# Patient Record
Sex: FEMALE | Race: WHITE | Employment: OTHER | URBAN - METROPOLITAN AREA
[De-identification: names, ages, dates, MRNs, and addresses within clinical notes are randomized per-mention and may not be internally consistent; named-entity substitution may affect disease eponyms.]

---

## 2021-02-14 ENCOUNTER — IMMUNIZATION (OUTPATIENT)
Dept: PRIMARY CARE CLINIC | Age: 71
End: 2021-02-14
Payer: MEDICARE

## 2021-02-14 PROCEDURE — 91300 COVID-19, PFIZER VACCINE 30MCG/0.3ML DOSE: CPT | Performed by: PHYSICIAN ASSISTANT

## 2021-02-14 PROCEDURE — 0001A COVID-19, PFIZER VACCINE 30MCG/0.3ML DOSE: CPT | Performed by: PHYSICIAN ASSISTANT

## 2021-03-10 ENCOUNTER — IMMUNIZATION (OUTPATIENT)
Dept: PRIMARY CARE CLINIC | Age: 71
End: 2021-03-10
Payer: MEDICARE

## 2021-03-10 PROCEDURE — 0002A COVID-19, PFIZER VACCINE 30MCG/0.3ML DOSE: CPT | Performed by: NURSE PRACTITIONER

## 2021-03-10 PROCEDURE — 91300 COVID-19, PFIZER VACCINE 30MCG/0.3ML DOSE: CPT | Performed by: NURSE PRACTITIONER

## 2021-09-22 ENCOUNTER — APPOINTMENT (OUTPATIENT)
Dept: CT IMAGING | Age: 71
DRG: 854 | End: 2021-09-22
Payer: MEDICARE

## 2021-09-22 ENCOUNTER — HOSPITAL ENCOUNTER (EMERGENCY)
Age: 71
Discharge: ANOTHER HOSPITAL | DRG: 854 | End: 2021-09-22
Payer: MEDICARE

## 2021-09-22 ENCOUNTER — HOSPITAL ENCOUNTER (INPATIENT)
Age: 71
LOS: 7 days | Discharge: HOME | DRG: 854 | End: 2021-09-29
Attending: EMERGENCY MEDICINE | Admitting: INTERNAL MEDICINE
Payer: MEDICARE

## 2021-09-22 VITALS
HEART RATE: 137 BPM | WEIGHT: 137 LBS | OXYGEN SATURATION: 98 % | TEMPERATURE: 97.8 F | DIASTOLIC BLOOD PRESSURE: 56 MMHG | BODY MASS INDEX: 20.76 KG/M2 | SYSTOLIC BLOOD PRESSURE: 96 MMHG | HEIGHT: 68 IN | RESPIRATION RATE: 16 BRPM

## 2021-09-22 DIAGNOSIS — A41.9 SEPSIS DUE TO CELLULITIS (HCC): ICD-10-CM

## 2021-09-22 DIAGNOSIS — N83.202 LEFT OVARIAN CYST: ICD-10-CM

## 2021-09-22 DIAGNOSIS — L03.90 CELLULITIS, UNSPECIFIED CELLULITIS SITE: Primary | ICD-10-CM

## 2021-09-22 DIAGNOSIS — L03.90 SEPSIS DUE TO CELLULITIS (HCC): ICD-10-CM

## 2021-09-22 DIAGNOSIS — L03.311 ABDOMINAL WALL CELLULITIS: Primary | ICD-10-CM

## 2021-09-22 LAB
ANION GAP SERPL CALCULATED.3IONS-SCNC: 15 MMOL/L (ref 7–16)
BASOPHILS ABSOLUTE: 0.05 E9/L (ref 0–0.2)
BASOPHILS RELATIVE PERCENT: 0.3 % (ref 0–2)
BUN BLDV-MCNC: 18 MG/DL (ref 6–23)
CALCIUM SERPL-MCNC: 9.3 MG/DL (ref 8.6–10.2)
CHLORIDE BLD-SCNC: 99 MMOL/L (ref 98–107)
CO2: 23 MMOL/L (ref 22–29)
CREAT SERPL-MCNC: 0.9 MG/DL (ref 0.5–1)
EOSINOPHILS ABSOLUTE: 0.06 E9/L (ref 0.05–0.5)
EOSINOPHILS RELATIVE PERCENT: 0.4 % (ref 0–6)
GFR AFRICAN AMERICAN: >60
GFR NON-AFRICAN AMERICAN: >60 ML/MIN/1.73
GLUCOSE BLD-MCNC: 121 MG/DL (ref 74–99)
HBA1C MFR BLD: 5.6 % (ref 4–5.6)
HCT VFR BLD CALC: 41.2 % (ref 34–48)
HEMOGLOBIN: 13.8 G/DL (ref 11.5–15.5)
IMMATURE GRANULOCYTES #: 0.1 E9/L
IMMATURE GRANULOCYTES %: 0.7 % (ref 0–5)
LACTIC ACID: 1.3 MMOL/L (ref 0.5–2.2)
LYMPHOCYTES ABSOLUTE: 0.78 E9/L (ref 1.5–4)
LYMPHOCYTES RELATIVE PERCENT: 5.2 % (ref 20–42)
MCH RBC QN AUTO: 27.8 PG (ref 26–35)
MCHC RBC AUTO-ENTMCNC: 33.5 % (ref 32–34.5)
MCV RBC AUTO: 83.1 FL (ref 80–99.9)
MONOCYTES ABSOLUTE: 0.8 E9/L (ref 0.1–0.95)
MONOCYTES RELATIVE PERCENT: 5.4 % (ref 2–12)
NEUTROPHILS ABSOLUTE: 13.1 E9/L (ref 1.8–7.3)
NEUTROPHILS RELATIVE PERCENT: 88 % (ref 43–80)
PDW BLD-RTO: 12.8 FL (ref 11.5–15)
PLATELET # BLD: 321 E9/L (ref 130–450)
PMV BLD AUTO: 9.2 FL (ref 7–12)
POTASSIUM SERPL-SCNC: 4 MMOL/L (ref 3.5–5)
RBC # BLD: 4.96 E12/L (ref 3.5–5.5)
SODIUM BLD-SCNC: 137 MMOL/L (ref 132–146)
TSH SERPL DL<=0.05 MIU/L-ACNC: 1.95 UIU/ML (ref 0.27–4.2)
WBC # BLD: 14.9 E9/L (ref 4.5–11.5)

## 2021-09-22 PROCEDURE — 85025 COMPLETE CBC W/AUTO DIFF WBC: CPT

## 2021-09-22 PROCEDURE — 1200000000 HC SEMI PRIVATE

## 2021-09-22 PROCEDURE — 6360000002 HC RX W HCPCS: Performed by: STUDENT IN AN ORGANIZED HEALTH CARE EDUCATION/TRAINING PROGRAM

## 2021-09-22 PROCEDURE — 74177 CT ABD & PELVIS W/CONTRAST: CPT

## 2021-09-22 PROCEDURE — 99223 1ST HOSP IP/OBS HIGH 75: CPT | Performed by: INTERNAL MEDICINE

## 2021-09-22 PROCEDURE — 80048 BASIC METABOLIC PNL TOTAL CA: CPT

## 2021-09-22 PROCEDURE — 99284 EMERGENCY DEPT VISIT MOD MDM: CPT

## 2021-09-22 PROCEDURE — 2580000003 HC RX 258: Performed by: INTERNAL MEDICINE

## 2021-09-22 PROCEDURE — 6370000000 HC RX 637 (ALT 250 FOR IP): Performed by: INTERNAL MEDICINE

## 2021-09-22 PROCEDURE — 2580000003 HC RX 258: Performed by: STUDENT IN AN ORGANIZED HEALTH CARE EDUCATION/TRAINING PROGRAM

## 2021-09-22 PROCEDURE — 83036 HEMOGLOBIN GLYCOSYLATED A1C: CPT

## 2021-09-22 PROCEDURE — 6360000002 HC RX W HCPCS: Performed by: INTERNAL MEDICINE

## 2021-09-22 PROCEDURE — 6360000004 HC RX CONTRAST MEDICATION: Performed by: RADIOLOGY

## 2021-09-22 PROCEDURE — 87040 BLOOD CULTURE FOR BACTERIA: CPT

## 2021-09-22 PROCEDURE — 99211 OFF/OP EST MAY X REQ PHY/QHP: CPT

## 2021-09-22 PROCEDURE — 83605 ASSAY OF LACTIC ACID: CPT

## 2021-09-22 PROCEDURE — 84443 ASSAY THYROID STIM HORMONE: CPT

## 2021-09-22 RX ORDER — SODIUM CHLORIDE 0.9 % (FLUSH) 0.9 %
5-40 SYRINGE (ML) INJECTION PRN
Status: DISCONTINUED | OUTPATIENT
Start: 2021-09-22 | End: 2021-09-29 | Stop reason: HOSPADM

## 2021-09-22 RX ORDER — 0.9 % SODIUM CHLORIDE 0.9 %
500 INTRAVENOUS SOLUTION INTRAVENOUS ONCE
Status: COMPLETED | OUTPATIENT
Start: 2021-09-22 | End: 2021-09-22

## 2021-09-22 RX ORDER — SODIUM CHLORIDE 0.9 % (FLUSH) 0.9 %
5-40 SYRINGE (ML) INJECTION EVERY 12 HOURS SCHEDULED
Status: DISCONTINUED | OUTPATIENT
Start: 2021-09-22 | End: 2021-09-29 | Stop reason: HOSPADM

## 2021-09-22 RX ORDER — SODIUM CHLORIDE 9 MG/ML
25 INJECTION, SOLUTION INTRAVENOUS PRN
Status: DISCONTINUED | OUTPATIENT
Start: 2021-09-22 | End: 2021-09-29 | Stop reason: HOSPADM

## 2021-09-22 RX ORDER — ONDANSETRON 4 MG/1
4 TABLET, ORALLY DISINTEGRATING ORAL EVERY 8 HOURS PRN
Status: DISCONTINUED | OUTPATIENT
Start: 2021-09-22 | End: 2021-09-29 | Stop reason: HOSPADM

## 2021-09-22 RX ORDER — 0.9 % SODIUM CHLORIDE 0.9 %
1000 INTRAVENOUS SOLUTION INTRAVENOUS ONCE
Status: COMPLETED | OUTPATIENT
Start: 2021-09-22 | End: 2021-09-22

## 2021-09-22 RX ORDER — ACETAMINOPHEN 650 MG/1
650 SUPPOSITORY RECTAL EVERY 6 HOURS PRN
Status: DISCONTINUED | OUTPATIENT
Start: 2021-09-22 | End: 2021-09-29 | Stop reason: HOSPADM

## 2021-09-22 RX ORDER — ACETAMINOPHEN 325 MG/1
650 TABLET ORAL EVERY 6 HOURS PRN
Status: DISCONTINUED | OUTPATIENT
Start: 2021-09-22 | End: 2021-09-29 | Stop reason: HOSPADM

## 2021-09-22 RX ORDER — ONDANSETRON 2 MG/ML
4 INJECTION INTRAMUSCULAR; INTRAVENOUS EVERY 6 HOURS PRN
Status: DISCONTINUED | OUTPATIENT
Start: 2021-09-22 | End: 2021-09-29 | Stop reason: HOSPADM

## 2021-09-22 RX ORDER — HYDRALAZINE HYDROCHLORIDE 20 MG/ML
10 INJECTION INTRAMUSCULAR; INTRAVENOUS EVERY 8 HOURS PRN
Status: DISCONTINUED | OUTPATIENT
Start: 2021-09-22 | End: 2021-09-29 | Stop reason: HOSPADM

## 2021-09-22 RX ORDER — POLYETHYLENE GLYCOL 3350 17 G/17G
17 POWDER, FOR SOLUTION ORAL DAILY PRN
Status: DISCONTINUED | OUTPATIENT
Start: 2021-09-22 | End: 2021-09-29 | Stop reason: HOSPADM

## 2021-09-22 RX ADMIN — VANCOMYCIN HYDROCHLORIDE 1250 MG: 10 INJECTION, POWDER, LYOPHILIZED, FOR SOLUTION INTRAVENOUS at 14:43

## 2021-09-22 RX ADMIN — SODIUM CHLORIDE 1000 ML: 9 INJECTION, SOLUTION INTRAVENOUS at 12:30

## 2021-09-22 RX ADMIN — ONDANSETRON 4 MG: 4 TABLET, ORALLY DISINTEGRATING ORAL at 22:53

## 2021-09-22 RX ADMIN — IOPAMIDOL 75 ML: 755 INJECTION, SOLUTION INTRAVENOUS at 12:51

## 2021-09-22 RX ADMIN — ENOXAPARIN SODIUM 40 MG: 40 INJECTION SUBCUTANEOUS at 18:31

## 2021-09-22 RX ADMIN — PIPERACILLIN SODIUM AND TAZOBACTAM SODIUM 3375 MG: 3; .375 INJECTION, POWDER, LYOPHILIZED, FOR SOLUTION INTRAVENOUS at 17:04

## 2021-09-22 RX ADMIN — SODIUM CHLORIDE 500 ML: 9 INJECTION, SOLUTION INTRAVENOUS at 16:43

## 2021-09-22 RX ADMIN — WATER 1000 MG: 1 INJECTION INTRAMUSCULAR; INTRAVENOUS; SUBCUTANEOUS at 18:31

## 2021-09-22 RX ADMIN — Medication 10 ML: at 20:02

## 2021-09-22 ASSESSMENT — PAIN SCALES - GENERAL
PAINLEVEL_OUTOF10: 0

## 2021-09-22 ASSESSMENT — ENCOUNTER SYMPTOMS
VOMITING: 0
SHORTNESS OF BREATH: 0
COUGH: 0
NAUSEA: 0
BACK PAIN: 0
SORE THROAT: 0
ABDOMINAL PAIN: 0

## 2021-09-22 NOTE — ED PROVIDER NOTES
Eyes:      General: No scleral icterus. Extraocular Movements: Extraocular movements intact. Conjunctiva/sclera: Conjunctivae normal.   Cardiovascular:      Rate and Rhythm: Regular rhythm. Tachycardia present. Pulses: Normal pulses. Heart sounds: Normal heart sounds. Pulmonary:      Effort: Pulmonary effort is normal.      Breath sounds: Normal breath sounds. Abdominal:      General: Abdomen is flat. Palpations: Abdomen is soft. Tenderness: There is no abdominal tenderness. There is no right CVA tenderness, left CVA tenderness, guarding or rebound. Musculoskeletal:         General: No swelling or deformity. Normal range of motion. Cervical back: Normal range of motion and neck supple. Right lower leg: No edema. Left lower leg: No edema. Skin:     General: Skin is warm and dry. Capillary Refill: Capillary refill takes less than 2 seconds. Comments: Significant wound in the epigastric region of the upper abdomen. See photo for further details. There is a small necrotic area with purulent exudate superficially with a surrounding area of erythema and warmth. There is also a 10 cm area of significant induration and firmness deep to the wound, concern for underlying abscess. Neurological:      General: No focal deficit present. Mental Status: She is alert. Psychiatric:         Mood and Affect: Mood normal.         Behavior: Behavior normal.         Thought Content: Thought content normal.         Judgment: Judgment normal.          **Informed Consent**    The patient has given verbal consent to have photos taken of epigastric wound and inserted into their ED Provider Note as part of their permanent medical record for purposes of documentation, treatment management and/or medical review.    All Images taken on 9/22/21 of patient name: Jaycee Soto were transmitted and stored on [id number] located within [facility] by tia procedures and agree with all pertinent clinical information unless otherwise noted. I have also reviewed and agree with the past medical, family and social history unless otherwise noted. I have discussed this patient in detail with the resident and provided the instruction and education regarding the evidence-based evaluation and treatment of abdominal wound. History: patient presents with wound of the upper abdomen. There was a skin tag that was irritated by her sports bra. She denies illness or fevers. My findings: Jade gN is a 73 y.o. female whom is in no distress. Physical exam reveals well appearing female. Large area of erythema and induration with vesicles. Tender to palpatino. My plan: Symptomatic and supportive care. Antibiotics and labs. Electronically signed by Aurelio Phillips DO on 9/22/21 at 1:05 PM EDT          [JS]   [phone] Discussed case with hospitalist Dr. Shital Urena, he requested adding Zosyn for gram-negative coverage, requested surgical consultation given possibility of developing fluid collection, need for surgical intervention.    []   [phone] Discussed case with general surgery Dr. Jose Covington, he agreed to consult on patient. [RH]      ED Course User Index  [JS] Aurelio Phillips DO  [RH] [address] Fair DO     ED Course as of Sep 22 1437   Wed Sep 22, 2021   1304   ATTENDING PROVIDER ATTESTATION:     I have personally performed and/or participated in the history, exam, medical decision making, and procedures and agree with all pertinent clinical information unless otherwise noted. I have also reviewed and agree with the past medical, family and social history unless otherwise noted. I have discussed this patient in detail with the resident and provided the instruction and education regarding the evidence-based evaluation and treatment of abdominal wound. History: patient presents with wound of the upper abdomen.   There was a skin tag that was irritated by her sports bra. She denies illness or fevers. My findings: Danilo Oliva is a 73 y.o. female whom is in no distress. Physical exam reveals well appearing female. Large area of erythema and induration with vesicles. Tender to palpatino. My plan: Symptomatic and supportive care. Antibiotics and labs. Electronically signed by Nicki Shi DO on 9/22/21 at 1:05 PM EDT          [JS]   [phone] Discussed case with hospitalist Dr. Jessica Dougherty, he requested adding Zosyn for gram-negative coverage, requested surgical consultation given possibility of developing fluid collection, need for surgical intervention.    [RH]   [phone] Discussed case with general surgery Dr. Monika Buenrostro, he agreed to consult on patient. [RH]      ED Course User Index  [JS] Nicki Shi DO  [RH] [address] Wilbere, DO       --------------------------------------------- PAST HISTORY ---------------------------------------------  Past Medical History:  has no past medical history on file. Past Surgical History:  has no past surgical history on file. Social History:  reports that she has never smoked. She has never used smokeless tobacco. She reports current alcohol use. She reports that she does not use drugs. Family History: family history is not on file. The patients home medications have been reviewed. Allergies: Patient has no known allergies.     -------------------------------------------------- RESULTS -------------------------------------------------    LABS:  Results for orders placed or performed during the hospital encounter of 09/22/21   CBC Auto Differential   Result Value Ref Range    WBC 14.9 (H) 4.5 - 11.5 E9/L    RBC 4.96 3.50 - 5.50 E12/L    Hemoglobin 13.8 11.5 - 15.5 g/dL    Hematocrit 41.2 34.0 - 48.0 %    MCV 83.1 80.0 - 99.9 fL    MCH 27.8 26.0 - 35.0 pg    MCHC 33.5 32.0 - 34.5 %    RDW 12.8 11.5 - 15.0 fL    Platelets 046 472 - 009 E9/L    MPV 9.2 7.0 - 12.0 fL Neutrophils % 88.0 (H) 43.0 - 80.0 %    Immature Granulocytes % 0.7 0.0 - 5.0 %    Lymphocytes % 5.2 (L) 20.0 - 42.0 %    Monocytes % 5.4 2.0 - 12.0 %    Eosinophils % 0.4 0.0 - 6.0 %    Basophils % 0.3 0.0 - 2.0 %    Neutrophils Absolute 13.10 (H) 1.80 - 7.30 E9/L    Immature Granulocytes # 0.10 E9/L    Lymphocytes Absolute 0.78 (L) 1.50 - 4.00 E9/L    Monocytes Absolute 0.80 0.10 - 0.95 E9/L    Eosinophils Absolute 0.06 0.05 - 0.50 E9/L    Basophils Absolute 0.05 0.00 - 0.20 E9/L   Lactic Acid, Plasma   Result Value Ref Range    Lactic Acid 1.3 0.5 - 2.2 mmol/L   Basic Metabolic Panel   Result Value Ref Range    Sodium 137 132 - 146 mmol/L    Potassium 4.0 3.5 - 5.0 mmol/L    Chloride 99 98 - 107 mmol/L    CO2 23 22 - 29 mmol/L    Anion Gap 15 7 - 16 mmol/L    Glucose 121 (H) 74 - 99 mg/dL    BUN 18 6 - 23 mg/dL    CREATININE 0.9 0.5 - 1.0 mg/dL    GFR Non-African American >60 >=60 mL/min/1.73    GFR African American >60     Calcium 9.3 8.6 - 10.2 mg/dL       RADIOLOGY:  CT ABDOMEN PELVIS W IV CONTRAST Additional Contrast? None   Final Result   Poorly defined irregular fluid attenuation with skin thickening in the   subcutaneous tissue with associated soft tissue swelling in the upper mid   abdomen could represent cellulitis given patient's history. Drainable   collection is not definitively seen. At least clinical follow-up to   resolution recommended. There is adenopathy seen in the mesentery along the celiac axis scattered in   the retroperitoneum into the pelvis. These lymph nodes predominantly are   just under criteria to be considered adenopathy but a few do meet criteria to   be considered adenopathy. These lymph nodes are nonspecific and could be   reactive or malignant. At least clinical correlation and a follow-up CT   recommended in 3 months. Complex septated cystic mass left hemipelvis felt to be associated with the   left ovary.   Further evaluation with non emergent ultrasound recommended. Levoscoliosis lumbar spine with marked associated degenerative change along   the right lateral aspect lumbar spine. RECOMMENDATIONS:   4.5 cm indeterminate ovarian cyst.  Recommend prompt follow-up with pelvic US. Reference: J Am Lamin Radiol 0381;77:[id number]               ------------------------- NURSING NOTES AND VITALS REVIEWED ---------------------------  Date / Time Roomed:  9/22/2021 11:43 AM  ED Bed Assignment:  23/23    The nursing notes within the ED encounter and vital signs as below have been reviewed. Patient Vitals for the past 24 hrs:   BP Temp Temp src Pulse Resp SpO2 Height Weight   09/22/21 1323 (!) 162/90 -- -- 126 14 99 % -- --   09/22/21 1008 (!) 178/89 -- -- -- 16 99 % 5' 8\" (1.727 m) 137 lb (62.1 kg)   09/22/21 0935 -- 97.4 °F (36.3 °C) Temporal 135 -- 100 % -- --       Oxygen Saturation Interpretation: Normal    ------------------------------------------ PROGRESS NOTES ------------------------------------------  Re-evaluation(s):  See ED COURSE    Counseling:  I have spoken with the patient and discussed todays results, in addition to providing specific details for the plan of care and counseling regarding the diagnosis and prognosis. Their questions are answered at this time and they are agreeable with the plan of admission.    --------------------------------- ADDITIONAL PROVIDER NOTES ---------------------------------  Consultations:  See ED COURSE  This patient's ED course included: a personal history and physicial examination, re-evaluation prior to disposition, multiple bedside re-evaluations, IV medications, cardiac monitoring and continuous pulse oximetry    This patient has remained hemodynamically stable during their ED course. Diagnosis:  1. Abdominal wall cellulitis New Problem   2. Sepsis due to cellulitis ([address] New Problem   3. Left ovarian cyst        Disposition:  Patient's disposition: Admit to telemetry  Patient's condition is stable. [address] Fair DO  Resident  09/22/21 5164

## 2021-09-22 NOTE — ED NOTES
IV infiltrated in RAC with vancomycin running. IV was aspirated, removed. Warm compress placed per [facility]. Dr Carmel Valentin aware.      Beryl Henriquez RN  09/22/21 6514

## 2021-09-22 NOTE — ED PROVIDER NOTES
HPI:  9/22/21, Time: 9:09 AM EDT         Aravind Augustin is a 73 y.o. female presenting to the ED for evaluation of a wound on her abdomen. She said it started last Thursday which was 6 days ago. She thinks she had a mole there and she thinks somehow it got rubbed off. She has an infection on her abdomen and is draining some drainage. Says she has not  Been running  a fever and she said she feels fine. Review of Systems:   A complete review of systems was performed and pertinent positives and negatives are stated within HPI, all other systems reviewed and are negative.          --------------------------------------------- PAST HISTORY ---------------------------------------------  Past Medical History:  has no past medical history on file. Past Surgical History:  has no past surgical history on file. Social History:  reports that she has never smoked. She has never used smokeless tobacco. She reports current alcohol use. She reports that she does not use drugs. Family History: family history is not on file. The patients home medications have been reviewed. Allergies: Patient has no known allergies. -------------------------------------------------- RESULTS -------------------------------------------------  All laboratory and radiology results have been personally reviewed by myself   LABS:  No results found for this visit on 09/22/21. RADIOLOGY:  Interpreted by Radiologist.  No orders to display       ------------------------- NURSING NOTES AND VITALS REVIEWED ---------------------------   The nursing notes within the ED encounter and vital signs as below have been reviewed.    BP (!) 96/56 Comment: Checked three times  Pulse 137 Comment: Checked 4 times/  twice in each arm  Temp 97.8 °F (36.6 °C)   Resp 16   Ht 5' 8\" (1.727 m)   Wt 137 lb (62.1 kg)   SpO2 98%   BMI 20.83 kg/m²   Oxygen Saturation Interpretation: Normal      ---------------------------------------------------PHYSICAL EXAM--------------------------------------      Constitutional/General: Alert and oriented x3, well appearing, non toxic in NAD  Head: Normocephalic and atraumatic  Eyes: clear  Mouth: , handling secretions, no trismus  Neck: Supple, full ROM,   Pulmonary: Lungs clear to auscultation bilaterally, no wheezes, rales, or rhonchi. Not in respiratory distress  Cardiovascular:  Tachycardia--142, regular  Abdomen: SHe has a necrotic appearing wound in the center of an erythematous area that is approximately 10 cm x 5 cm there is some foul-smelling drainage present. It is firm to the touch around the wound  Extremities: Moves all extremities x 4. Skin: warm and dry without rash  Neurologic: GCS 15,  Psych: Normal Affect      ------------------------------ ED COURSE/MEDICAL DECISION MAKING----------------------  Medications - No data to display      ED COURSE:       Medical Decision Making:    Patient has a wound with foul-smelling drainage her pressure is only 96/56 and her heart rates in the one forties. Did advise this patient that she will need to go to the emergency department. Concern with sepsis. Her friend will drive her.        --------------------------------- IMPRESSION AND DISPOSITION ---------------------------------    IMPRESSION  1. Cellulitis, unspecified cellulitis site        DISPOSITION  Disposition: she was advised to go directly to the Emergency Department    NOTE: This report was transcribed using voice recognition software.  Every effort was made to ensure accuracy; however, inadvertent computerized transcription errors may be present     THERESA Claudio - CNP  09/22/21 7754

## 2021-09-22 NOTE — CARE COORDINATION
SS Note: No Covid testing. Pt is Inpatient in ED. SW met with pt in ED for transition of care. Pt stated she resides with her significant other Aris Aguilar in a one story home, ramp entry into home. Stated was independent prior to admission including driving. Stated no past SNF, HHC, or DME. Stated she does not have a PCP, denies needing assistance locating a PCP as she  has a specific physician she prefers to contact herself. Patient has prescription coverage, uses [facility]. Pt plans to return home upon discharge, significant other to transport home, denies any needs including Tommy Roa.   Electronically signed by DEMETRIA Quinonez on 9/22/2021 at 4:07 PM

## 2021-09-22 NOTE — ED NOTES
FIRST PROVIDER CONTACT ASSESSMENT NOTE                                                                                                Department of Emergency Medicine                                                      First Provider Note  21  10:14 AM EDT  NAME: Jo Franco  : 1950  MRN: [id number]    Chief Complaint: Wound Infection (started as skin tear)      History of Present Illness:   Jo Franco is a 73 y.o. female who presents to the ED for infection in the epigastric area. Focused Physical Exam:  VS:    ED Triage Vitals   BP Temp Temp Source Pulse Resp SpO2 Height Weight   21 1008 21 0935 21 0935 21 0935 21 1008 21 0935 21 1008 21 1008   (!) 178/89 97.4 °F (36.3 °C) Temporal 135 16 100 % 5' 8\" (1.727 m) 137 lb (62.1 kg)        General: Alert and in no apparent distress. Large area of induration and erythema in the epigastric area. Medical History:  has no past medical history on file. Surgical History:  has no past surgical history on file. Social History:  reports that she has never smoked. She has never used smokeless tobacco. She reports current alcohol use. She reports that she does not use drugs. Family History: family history is not on file. Allergies: Patient has no known allergies.      Initial Plan of Care:  Initiate Treatment-Testing, Proceed toTreatment Area When Bed Available for ED Attending/MLP to Continue Care    -------------------------------------------------END OF FIRST PROVIDER CONTACT ASSESSMENT NOTE--------------------------------------------------------  Electronically signed by TROY Lizarraga   DD: 21       Lucas Lizarraga  21 1014

## 2021-09-22 NOTE — H&P
age  EYES:  Lids and lashes normal, pupils equal, round and reactive to light, extra ocular muscles intact, sclera clear, conjunctiva normal  ENT:  normocepalic, without obvious abnormality  HEMATOLOGIC/LYMPHATICS:  no cervical lymphadenopathy  BACK:  Symmetric, no curvature, spinous processes are non-tender on palpation, paraspinous muscles are non-tender on palpation, no costal vertebral tenderness  LUNGS:  No increased work of breathing, good air exchange, clear to auscultation bilaterally, no crackles or wheezing  CARDIOVASCULAR:  tachycardic with regular rhythm  ABDOMEN:  No scars, normal bowel sounds, soft, non-distended, non-tender, Please see pic in ER note for cellulitis  MUSCULOSKELETAL:  full range of motion noted  NEUROLOGIC:  Mental Status Exam:  Level of Alertness:   awake  Orientation:   person, place, time  Motor Exam:  Motor exam is symmetrical 5 out of 5 all extremities bilaterally  SKIN:  no bruising or bleeding    DATA:  CBC:   Lab Results   Component Value Date    WBC 14.9 09/22/2021    RBC 4.96 09/22/2021    HGB 13.8 09/22/2021    HCT 41.2 09/22/2021    MCV 83.1 09/22/2021    MCH 27.8 09/22/2021    MCHC 33.5 09/22/2021    RDW 12.8 09/22/2021     09/22/2021    MPV 9.2 09/22/2021     BMP:    Lab Results   Component Value Date     09/22/2021    K 4.0 09/22/2021    CL 99 09/22/2021    CO2 23 09/22/2021    BUN 18 09/22/2021    CREATININE 0.9 09/22/2021    CALCIUM 9.3 09/22/2021    GFRAA >60 09/22/2021    LABGLOM >60 09/22/2021    GLUCOSE 121 09/22/2021     ASSESSMENT AND PLAN:      Active Problems:    Sepsis due to cellulitis (HCC)  Treat with Vanco and Ceftriaxone  Monitor improvement and vital signs  gen surgery consult for further recommendations    2. Ovarian Cyst: further work up with US     Check TSH, A1c and lipid panel.     3.  Elevated BP without diagnosis of HTN:  Will monitor for now and if consistently elevated, will treat with meds

## 2021-09-23 ENCOUNTER — ANESTHESIA EVENT (OUTPATIENT)
Dept: OPERATING ROOM | Age: 71
DRG: 854 | End: 2021-09-23
Payer: MEDICARE

## 2021-09-23 ENCOUNTER — ANESTHESIA (OUTPATIENT)
Dept: OPERATING ROOM | Age: 71
DRG: 854 | End: 2021-09-23
Payer: MEDICARE

## 2021-09-23 VITALS
OXYGEN SATURATION: 100 % | RESPIRATION RATE: 18 BRPM | DIASTOLIC BLOOD PRESSURE: 90 MMHG | SYSTOLIC BLOOD PRESSURE: 143 MMHG

## 2021-09-23 LAB
AMPHETAMINE SCREEN, URINE: NOT DETECTED
ANION GAP SERPL CALCULATED.3IONS-SCNC: 10 MMOL/L (ref 7–16)
BACTERIA: ABNORMAL /HPF
BARBITURATE SCREEN URINE: NOT DETECTED
BASOPHILS ABSOLUTE: 0.05 E9/L (ref 0–0.2)
BASOPHILS RELATIVE PERCENT: 0.4 % (ref 0–2)
BENZODIAZEPINE SCREEN, URINE: NOT DETECTED
BILIRUBIN URINE: NEGATIVE
BLOOD, URINE: ABNORMAL
BUN BLDV-MCNC: 13 MG/DL (ref 6–23)
CALCIUM SERPL-MCNC: 8.2 MG/DL (ref 8.6–10.2)
CANNABINOID SCREEN URINE: NOT DETECTED
CHLORIDE BLD-SCNC: 103 MMOL/L (ref 98–107)
CHOLESTEROL, TOTAL: 159 MG/DL (ref 0–199)
CLARITY: ABNORMAL
CO2: 24 MMOL/L (ref 22–29)
COCAINE METABOLITE SCREEN URINE: NOT DETECTED
COLOR: YELLOW
CREAT SERPL-MCNC: 0.8 MG/DL (ref 0.5–1)
EKG ATRIAL RATE: 99 BPM
EKG P AXIS: 57 DEGREES
EKG P-R INTERVAL: 152 MS
EKG Q-T INTERVAL: 348 MS
EKG QRS DURATION: 88 MS
EKG QTC CALCULATION (BAZETT): 446 MS
EKG R AXIS: 15 DEGREES
EKG T AXIS: 40 DEGREES
EKG VENTRICULAR RATE: 99 BPM
EOSINOPHILS ABSOLUTE: 0.02 E9/L (ref 0.05–0.5)
EOSINOPHILS RELATIVE PERCENT: 0.2 % (ref 0–6)
EPITHELIAL CELLS, UA: ABNORMAL /HPF
FENTANYL SCREEN, URINE: NOT DETECTED
GFR AFRICAN AMERICAN: >60
GFR NON-AFRICAN AMERICAN: >60 ML/MIN/1.73
GLUCOSE BLD-MCNC: 109 MG/DL (ref 74–99)
GLUCOSE URINE: NEGATIVE MG/DL
HCT VFR BLD CALC: 35.2 % (ref 34–48)
HDLC SERPL-MCNC: 31 MG/DL
HEMOGLOBIN: 11.3 G/DL (ref 11.5–15.5)
IMMATURE GRANULOCYTES #: 0.15 E9/L
IMMATURE GRANULOCYTES %: 1.3 % (ref 0–5)
KETONES, URINE: ABNORMAL MG/DL
LDL CHOLESTEROL CALCULATED: 109 MG/DL (ref 0–99)
LEUKOCYTE ESTERASE, URINE: ABNORMAL
LYMPHOCYTES ABSOLUTE: 0.71 E9/L (ref 1.5–4)
LYMPHOCYTES RELATIVE PERCENT: 6.1 % (ref 20–42)
Lab: NORMAL
MAGNESIUM: 2 MG/DL (ref 1.6–2.6)
MCH RBC QN AUTO: 27.4 PG (ref 26–35)
MCHC RBC AUTO-ENTMCNC: 32.1 % (ref 32–34.5)
MCV RBC AUTO: 85.2 FL (ref 80–99.9)
METHADONE SCREEN, URINE: NOT DETECTED
MONOCYTES ABSOLUTE: 0.64 E9/L (ref 0.1–0.95)
MONOCYTES RELATIVE PERCENT: 5.5 % (ref 2–12)
NEUTROPHILS ABSOLUTE: 10.09 E9/L (ref 1.8–7.3)
NEUTROPHILS RELATIVE PERCENT: 86.5 % (ref 43–80)
NITRITE, URINE: NEGATIVE
OPIATE SCREEN URINE: NOT DETECTED
OXYCODONE URINE: NOT DETECTED
PDW BLD-RTO: 12.8 FL (ref 11.5–15)
PH UA: 5.5 (ref 5–9)
PHENCYCLIDINE SCREEN URINE: NOT DETECTED
PLATELET # BLD: 263 E9/L (ref 130–450)
PMV BLD AUTO: 9.3 FL (ref 7–12)
POTASSIUM REFLEX MAGNESIUM: 3.8 MMOL/L (ref 3.5–5)
PROTEIN UA: >=300 MG/DL
RBC # BLD: 4.13 E12/L (ref 3.5–5.5)
RBC UA: >20 /HPF (ref 0–2)
SODIUM BLD-SCNC: 137 MMOL/L (ref 132–146)
SPECIFIC GRAVITY UA: 1.02 (ref 1–1.03)
TRIGL SERPL-MCNC: 93 MG/DL (ref 0–149)
UROBILINOGEN, URINE: 1 E.U./DL
VLDLC SERPL CALC-MCNC: 19 MG/DL
WBC # BLD: 11.7 E9/L (ref 4.5–11.5)
WBC UA: ABNORMAL /HPF (ref 0–5)
YEAST: PRESENT /HPF

## 2021-09-23 PROCEDURE — 2580000003 HC RX 258: Performed by: INTERNAL MEDICINE

## 2021-09-23 PROCEDURE — 6360000002 HC RX W HCPCS: Performed by: SURGERY

## 2021-09-23 PROCEDURE — 3700000001 HC ADD 15 MINUTES (ANESTHESIA): Performed by: SURGERY

## 2021-09-23 PROCEDURE — 7100000000 HC PACU RECOVERY - FIRST 15 MIN: Performed by: SURGERY

## 2021-09-23 PROCEDURE — 2580000003 HC RX 258

## 2021-09-23 PROCEDURE — 87186 SC STD MICRODIL/AGAR DIL: CPT

## 2021-09-23 PROCEDURE — 2500000003 HC RX 250 WO HCPCS: Performed by: SURGERY

## 2021-09-23 PROCEDURE — 80307 DRUG TEST PRSMV CHEM ANLYZR: CPT

## 2021-09-23 PROCEDURE — 2709999900 HC NON-CHARGEABLE SUPPLY: Performed by: SURGERY

## 2021-09-23 PROCEDURE — 7100000001 HC PACU RECOVERY - ADDTL 15 MIN: Performed by: SURGERY

## 2021-09-23 PROCEDURE — 10061 I&D ABSCESS COMP/MULTIPLE: CPT | Performed by: SURGERY

## 2021-09-23 PROCEDURE — 6360000002 HC RX W HCPCS

## 2021-09-23 PROCEDURE — 2580000003 HC RX 258: Performed by: SURGERY

## 2021-09-23 PROCEDURE — 6370000000 HC RX 637 (ALT 250 FOR IP): Performed by: INTERNAL MEDICINE

## 2021-09-23 PROCEDURE — 1200000000 HC SEMI PRIVATE

## 2021-09-23 PROCEDURE — 3600000012 HC SURGERY LEVEL 2 ADDTL 15MIN: Performed by: SURGERY

## 2021-09-23 PROCEDURE — 93005 ELECTROCARDIOGRAM TRACING: CPT | Performed by: INTERNAL MEDICINE

## 2021-09-23 PROCEDURE — 0JB80ZZ EXCISION OF ABDOMEN SUBCUTANEOUS TISSUE AND FASCIA, OPEN APPROACH: ICD-10-PCS | Performed by: SURGERY

## 2021-09-23 PROCEDURE — 6370000000 HC RX 637 (ALT 250 FOR IP): Performed by: SURGERY

## 2021-09-23 PROCEDURE — 99233 SBSQ HOSP IP/OBS HIGH 50: CPT | Performed by: INTERNAL MEDICINE

## 2021-09-23 PROCEDURE — 87102 FUNGUS ISOLATION CULTURE: CPT

## 2021-09-23 PROCEDURE — 83735 ASSAY OF MAGNESIUM: CPT

## 2021-09-23 PROCEDURE — 87077 CULTURE AEROBIC IDENTIFY: CPT

## 2021-09-23 PROCEDURE — 87206 SMEAR FLUORESCENT/ACID STAI: CPT

## 2021-09-23 PROCEDURE — 81001 URINALYSIS AUTO W/SCOPE: CPT

## 2021-09-23 PROCEDURE — 87205 SMEAR GRAM STAIN: CPT

## 2021-09-23 PROCEDURE — 80061 LIPID PANEL: CPT

## 2021-09-23 PROCEDURE — 87015 SPECIMEN INFECT AGNT CONCNTJ: CPT

## 2021-09-23 PROCEDURE — 85025 COMPLETE CBC W/AUTO DIFF WBC: CPT

## 2021-09-23 PROCEDURE — 87116 MYCOBACTERIA CULTURE: CPT

## 2021-09-23 PROCEDURE — 36415 COLL VENOUS BLD VENIPUNCTURE: CPT

## 2021-09-23 PROCEDURE — 3700000000 HC ANESTHESIA ATTENDED CARE: Performed by: SURGERY

## 2021-09-23 PROCEDURE — 99222 1ST HOSP IP/OBS MODERATE 55: CPT | Performed by: SURGERY

## 2021-09-23 PROCEDURE — 3600000002 HC SURGERY LEVEL 2 BASE: Performed by: SURGERY

## 2021-09-23 PROCEDURE — 87070 CULTURE OTHR SPECIMN AEROBIC: CPT

## 2021-09-23 PROCEDURE — 87075 CULTR BACTERIA EXCEPT BLOOD: CPT

## 2021-09-23 PROCEDURE — 80048 BASIC METABOLIC PNL TOTAL CA: CPT

## 2021-09-23 PROCEDURE — 87088 URINE BACTERIA CULTURE: CPT

## 2021-09-23 RX ORDER — DIPHENHYDRAMINE HYDROCHLORIDE 50 MG/ML
12.5 INJECTION INTRAMUSCULAR; INTRAVENOUS
Status: DISCONTINUED | OUTPATIENT
Start: 2021-09-23 | End: 2021-09-23

## 2021-09-23 RX ORDER — SODIUM CHLORIDE 9 MG/ML
INJECTION, SOLUTION INTRAVENOUS CONTINUOUS
Status: DISCONTINUED | OUTPATIENT
Start: 2021-09-23 | End: 2021-09-24

## 2021-09-23 RX ORDER — BUPIVACAINE HYDROCHLORIDE AND EPINEPHRINE 2.5; 5 MG/ML; UG/ML
INJECTION, SOLUTION EPIDURAL; INFILTRATION; INTRACAUDAL; PERINEURAL PRN
Status: DISCONTINUED | OUTPATIENT
Start: 2021-09-23 | End: 2021-09-23 | Stop reason: ALTCHOICE

## 2021-09-23 RX ORDER — FENTANYL CITRATE 50 UG/ML
INJECTION, SOLUTION INTRAMUSCULAR; INTRAVENOUS PRN
Status: DISCONTINUED | OUTPATIENT
Start: 2021-09-23 | End: 2021-09-23 | Stop reason: SDUPTHER

## 2021-09-23 RX ORDER — OXYCODONE HYDROCHLORIDE 5 MG/1
10 TABLET ORAL EVERY 4 HOURS PRN
Status: DISCONTINUED | OUTPATIENT
Start: 2021-09-23 | End: 2021-09-29 | Stop reason: HOSPADM

## 2021-09-23 RX ORDER — PROCHLORPERAZINE EDISYLATE 5 MG/ML
5 INJECTION INTRAMUSCULAR; INTRAVENOUS
Status: DISCONTINUED | OUTPATIENT
Start: 2021-09-23 | End: 2021-09-23

## 2021-09-23 RX ORDER — OXYCODONE HYDROCHLORIDE 5 MG/1
5 TABLET ORAL EVERY 4 HOURS PRN
Status: DISCONTINUED | OUTPATIENT
Start: 2021-09-23 | End: 2021-09-29 | Stop reason: HOSPADM

## 2021-09-23 RX ORDER — SODIUM CHLORIDE 9 MG/ML
INJECTION, SOLUTION INTRAVENOUS CONTINUOUS PRN
Status: DISCONTINUED | OUTPATIENT
Start: 2021-09-23 | End: 2021-09-23 | Stop reason: SDUPTHER

## 2021-09-23 RX ORDER — MEPERIDINE HYDROCHLORIDE 25 MG/ML
12.5 INJECTION INTRAMUSCULAR; INTRAVENOUS; SUBCUTANEOUS EVERY 5 MIN PRN
Status: DISCONTINUED | OUTPATIENT
Start: 2021-09-23 | End: 2021-09-23

## 2021-09-23 RX ORDER — MIDAZOLAM HYDROCHLORIDE 1 MG/ML
INJECTION INTRAMUSCULAR; INTRAVENOUS PRN
Status: DISCONTINUED | OUTPATIENT
Start: 2021-09-23 | End: 2021-09-23 | Stop reason: SDUPTHER

## 2021-09-23 RX ORDER — LISINOPRIL 20 MG/1
40 TABLET ORAL DAILY
Status: DISCONTINUED | OUTPATIENT
Start: 2021-09-23 | End: 2021-09-29 | Stop reason: HOSPADM

## 2021-09-23 RX ORDER — HYDROCODONE BITARTRATE AND ACETAMINOPHEN 5; 325 MG/1; MG/1
1 TABLET ORAL
Status: DISCONTINUED | OUTPATIENT
Start: 2021-09-23 | End: 2021-09-23

## 2021-09-23 RX ORDER — FENTANYL CITRATE 50 UG/ML
25 INJECTION, SOLUTION INTRAMUSCULAR; INTRAVENOUS EVERY 5 MIN PRN
Status: DISCONTINUED | OUTPATIENT
Start: 2021-09-23 | End: 2021-09-23

## 2021-09-23 RX ORDER — PROPOFOL 10 MG/ML
INJECTION, EMULSION INTRAVENOUS CONTINUOUS PRN
Status: DISCONTINUED | OUTPATIENT
Start: 2021-09-23 | End: 2021-09-23 | Stop reason: SDUPTHER

## 2021-09-23 RX ADMIN — MIDAZOLAM 1 MG: 1 INJECTION INTRAMUSCULAR; INTRAVENOUS at 13:53

## 2021-09-23 RX ADMIN — WATER 1000 MG: 1 INJECTION INTRAMUSCULAR; INTRAVENOUS; SUBCUTANEOUS at 18:09

## 2021-09-23 RX ADMIN — FENTANYL CITRATE 50 MCG: 50 INJECTION, SOLUTION INTRAMUSCULAR; INTRAVENOUS at 13:49

## 2021-09-23 RX ADMIN — SODIUM CHLORIDE: 9 INJECTION, SOLUTION INTRAVENOUS at 10:39

## 2021-09-23 RX ADMIN — LISINOPRIL 40 MG: 20 TABLET ORAL at 10:40

## 2021-09-23 RX ADMIN — PROPOFOL INJECTABLE EMULSION 100 MCG/KG/MIN: 10 INJECTION, EMULSION INTRAVENOUS at 13:34

## 2021-09-23 RX ADMIN — METOPROLOL TARTRATE 25 MG: 25 TABLET, FILM COATED ORAL at 21:22

## 2021-09-23 RX ADMIN — MIDAZOLAM 1 MG: 1 INJECTION INTRAMUSCULAR; INTRAVENOUS at 13:48

## 2021-09-23 RX ADMIN — Medication 10 ML: at 09:24

## 2021-09-23 RX ADMIN — METOPROLOL TARTRATE 25 MG: 25 TABLET, FILM COATED ORAL at 10:40

## 2021-09-23 RX ADMIN — VANCOMYCIN HYDROCHLORIDE 1250 MG: 10 INJECTION, POWDER, LYOPHILIZED, FOR SOLUTION INTRAVENOUS at 15:05

## 2021-09-23 RX ADMIN — SODIUM CHLORIDE: 9 INJECTION, SOLUTION INTRAVENOUS at 13:42

## 2021-09-23 RX ADMIN — SODIUM CHLORIDE: 9 INJECTION, SOLUTION INTRAVENOUS at 17:50

## 2021-09-23 RX ADMIN — FENTANYL CITRATE 50 MCG: 50 INJECTION, SOLUTION INTRAMUSCULAR; INTRAVENOUS at 13:52

## 2021-09-23 RX ADMIN — FENTANYL CITRATE 50 MCG: 50 INJECTION, SOLUTION INTRAMUSCULAR; INTRAVENOUS at 13:42

## 2021-09-23 RX ADMIN — Medication 10 ML: at 21:24

## 2021-09-23 ASSESSMENT — PULMONARY FUNCTION TESTS
PIF_VALUE: 0
PIF_VALUE: 13
PIF_VALUE: 14
PIF_VALUE: 13
PIF_VALUE: 0
PIF_VALUE: 13
PIF_VALUE: 2
PIF_VALUE: 13
PIF_VALUE: 0
PIF_VALUE: 13
PIF_VALUE: 1
PIF_VALUE: 13
PIF_VALUE: 0
PIF_VALUE: 13
PIF_VALUE: 14
PIF_VALUE: 0
PIF_VALUE: 14
PIF_VALUE: 13
PIF_VALUE: 0
PIF_VALUE: 13
PIF_VALUE: 6

## 2021-09-23 ASSESSMENT — PAIN SCALES - GENERAL: PAINLEVEL_OUTOF10: 0

## 2021-09-23 ASSESSMENT — LIFESTYLE VARIABLES: SMOKING_STATUS: 0

## 2021-09-23 NOTE — PLAN OF CARE
Problem: Pain:  Goal: Pain level will decrease  Description: Pain level will decrease  9/23/2021 0451 by Fatoumata Gtz RN  Outcome: Met This Shift  9/22/2021 1852 by Inocencia Jordan RN  Outcome: Met This Shift  Goal: Control of acute pain  Description: Control of acute pain  9/23/2021 0451 by Fatoumata Gtz RN  Outcome: Met This Shift  9/22/2021 1852 by Inocencia Jordan RN  Outcome: Met This Shift

## 2021-09-23 NOTE — CARE COORDINATION
9/23/2021 0957 OLESYA note: No covid testing. Follow up visit made to patient. Pt for I/D of abd wall wound today. Pt agreeable for CM assistant to make [id number] [facility] PCP appointment. Patient plans to return home upon dc. CM/SW to follow for possible wound care/abx needs.  Shun ARAIZA

## 2021-09-23 NOTE — OP NOTE
Operative Note      Patient: Kiesha Grimes  YOB: 1950  MRN: [id number]    Date of Procedure: 9/23/2021    Pre-Op Diagnosis: ABDOMINAL WALL INFECTION AND ABSCESS    Post-Op Diagnosis: Same       Procedure(s):  EXCISIONAL DEBRIDEMENT OF ABDOMINAL WALL WITH INCISION AND DRAINAGE OF ABSCESS    Measurements  4 cm Width x 7 cm Length x 2 cm Depth          Surgeon(s):  Sierra Goldman MD    Assistant:   First Assistant: Kamila Garcias RN  Resident: Aleah Foss DO    Anesthesia: Monitor Anesthesia Care    Estimated Blood Loss (mL): Minimal    Complications: None    Specimens:   ID Type Source Tests Collected by Time Destination   1 : ABDOMINAL WALL ABSCESS TISSUE FOR CULTURE-AEROBIC, ANAEROBIC, ACID FAST, GRAM STAIN, FUNGUS Tissue Abdomen CULTURE, ANAEROBIC, CULTURE, FUNGUS, GRAM STAIN, CULTURE, SURGICAL, CULTURE WITH SMEAR, ACID FAST 55 Darrell Ghosh MD 9/23/2021 1350    2 : ABDOMINAL WALL ABSCESS SWAB FOR CULTURE-AEROBIC, ANAEROBIC, ACID FAST, GRAM STAIN, FUNGUS Swab Abdomen CULTURE, ANAEROBIC, CULTURE, FUNGUS, GRAM STAIN, CULTURE, SURGICAL, CULTURE WITH SMEAR, ACID FAST 55 Darrell Ghosh MD 9/23/2021 1351        Implants:  * No implants in log *      Drains: * No LDAs found *    Findings: ABDOMINAL WALL ABSCESS 7CM L X 4CM W X 2CM D    Detailed Description of Procedure:   DESCRIPTION OF PROCEDURE: The patient was identified and the procedure was confirmed. Patient is receiving scheduled antibiotics. Anesthesia was obtained per anesthesia record. The wound and surrounding area was prepped and draped in sterile fashion. The skin was anesthetized with 0.25% marcaine with epinephrine. With the patient in supine position, a #15 blade was used to elipse the necrotic area of skin and incise the abscess. Pus drained out and a swab culture was collected from within the abscess cavity.   The wound was debrided sharply of fibrotic, necrotic, and hyperkeratotic tissue,

## 2021-09-23 NOTE — PLAN OF CARE
Problem: Pain:  Goal: Pain level will decrease  Description: Pain level will decrease  9/23/2021 1807 by Lizet Hope RN  Outcome: Met This Shift     Problem: Pain:  Goal: Control of acute pain  Description: Control of acute pain  9/23/2021 1807 by Lizet Hope RN  Outcome: Met This Shift

## 2021-09-23 NOTE — CONSULTS
General Surgery Consult  Saturnino Cabrera MD, MS    Patient's Name/Date of Birth: Fern Shepard / 1950    Date: September 23, 2021     Consulting Surgeon: Karla Anders MD    PCP: No primary care provider on file. Chief Complaint: abdominal wall abscess    HPI:   Fern Shepard is a 73 y.o. female who presents for evaluation of abdominal wall abscess. Timing is constant, radiation to midine epigastric, alleviated by none and started a week ago and severity is 8/10. Denies trauma. States she had an ingrown hair that she picked. Denies similar in the past.       Patient Active Problem List   Diagnosis    Sepsis due to cellulitis ([facility] [address]       No Known Allergies    No past medical history on file. Past Surgical History:   Procedure Laterality Date    TONSILLECTOMY         Social History     Socioeconomic History    Marital status:      Spouse name: Not on file    Number of children: Not on file    Years of education: Not on file    Highest education level: Not on file   Occupational History    Not on file   Tobacco Use    Smoking status: Never Smoker    Smokeless tobacco: Never Used   Vaping Use    Vaping Use: Never used   Substance and Sexual Activity    Alcohol use: Yes     Comment: Socially    Drug use: Never    Sexual activity: Not Currently     Partners: Male   Other Topics Concern    Not on file   Social History Narrative    Not on file     Social Determinants of Health     Financial Resource Strain:     Difficulty of Paying Living Expenses:    Food Insecurity:     Worried About Running Out of Food in the Last Year:     [address] in the Last Year:    Transportation Needs:     Lack of Transportation (Medical):      Lack of Transportation (Non-Medical):    Physical Activity:     Days of Exercise per Week:     Minutes of Exercise per Session:    Stress:     Feeling of Stress :    Social Connections:     Frequency of Communication with hallucinations, suicidal ideation      Physical exam:   BP (!) 164/87   Pulse 121   Temp 98.1 °F (36.7 °C) (Oral)   Resp 16   Ht 5' 8\" (1.727 m)   Wt 137 lb (62.1 kg)   SpO2 98%   BMI 20.83 kg/m²   General appearance:  NAD, appears stated age  Head: NCAT, PERRLA, EOMI, red conjunctiva  Neck: supple, no masses, trachea midline  Lungs: Equal chest rise bilateral, no retractions, no wheezing  Heart: Reg rate  Abdomen: soft, tender epigastric, cellulitis, skin necrosis  Skin; warm and dry, no cyanosis  Gu: no cva tenderness  Extremities: atraumatic, no focal motor deficits, no open wounds  Psych: No tremor, visual hallucination    Radiology: I reviewed relevant abdominal imaging from this admission and that available in the EMR including CT abd/pel from admission. My assessment is cellulitis with small abscess    Assessment:  Radha France is a 73 y.o. female with necrotizing soft tissue infection    Patient Active Problem List   Diagnosis    Sepsis due to cellulitis [facility])       Plan:  OR for I&D with excisional debridement  IV abx  Time spent reviewing past medical, surgical, social and family history, vitals, nursing assessment and images. Time spent face to face with patient and family counciling and discussing care exceeded 50% of the time of the consult. Additional time spent reviewing images and labs, discussing case with nursing, support staff and other physicians; as well as coordinating care.         Physician Signature: Electronically signed by Dr. Lena Weber  [phone] (p)

## 2021-09-23 NOTE — ANESTHESIA POSTPROCEDURE EVALUATION
Department of Anesthesiology  Postprocedure Note    Patient: Meg Lyon  MRN: [id number]  YOB: 1950  Date of evaluation: 9/23/2021  Time:  2:34 PM     Procedure Summary     Date: 09/23/21 Room / Location: [address] [place] [address] 03 / [address]    Anesthesia Start: 0234 Anesthesia Stop: 0001    Procedure: INCISION AND DRAINAGE ABDOMINAL WALL ABSCESS (N/A ) Diagnosis: (ABDOMINAL WALL ABSCESS)    Surgeons: Raiza Guajardo MD Responsible Provider: Fantasma Kerr DO    Anesthesia Type: MAC ASA Status: 3          Anesthesia Type: MAC    Todd Phase I: Todd Score: 8    Todd Phase II:      Last vitals: Reviewed and per EMR flowsheets.        Anesthesia Post Evaluation    Patient location during evaluation: bedside  Patient participation: complete - patient participated  Level of consciousness: awake  Pain score: 2  Airway patency: patent  Nausea & Vomiting: no vomiting and no nausea  Complications: no  Cardiovascular status: hemodynamically stable  Respiratory status: acceptable  Hydration status: stable

## 2021-09-23 NOTE — PROGRESS NOTES
Department of Internal Medicine  General Internal Medicine  Attending Progress Note  Chief Complaint   Patient presents with    Wound Infection     started as skin tear     SUBJECTIVE:    Reports that she is doing better today. Denied fever and chills. No tenderness at the epigastric region. No diarrhea.     OBJECTIVE      Medications    Current Facility-Administered Medications: 0.9 % sodium chloride infusion, , IntraVENous, Continuous  metoprolol tartrate (LOPRESSOR) tablet 25 mg, 25 mg, Oral, BID  lisinopril (PRINIVIL;ZESTRIL) tablet 40 mg, 40 mg, Oral, Daily  sodium chloride flush 0.9 % injection 5-40 mL, 5-40 mL, IntraVENous, 2 times per day  sodium chloride flush 0.9 % injection 5-40 mL, 5-40 mL, IntraVENous, PRN  0.9 % sodium chloride infusion, 25 mL, IntraVENous, PRN  enoxaparin (LOVENOX) injection 40 mg, 40 mg, SubCUTAneous, Daily  ondansetron (ZOFRAN-ODT) disintegrating tablet 4 mg, 4 mg, Oral, Q8H PRN **OR** ondansetron (ZOFRAN) injection 4 mg, 4 mg, IntraVENous, Q6H PRN  polyethylene glycol (GLYCOLAX) packet 17 g, 17 g, Oral, Daily PRN  acetaminophen (TYLENOL) tablet 650 mg, 650 mg, Oral, Q6H PRN **OR** acetaminophen (TYLENOL) suppository 650 mg, 650 mg, Rectal, Q6H PRN  vancomycin (VANCOCIN) 1,250 mg in dextrose 5 % 250 mL IVPB, 1,250 mg, IntraVENous, Q24H  cefTRIAXone (ROCEPHIN) 1,000 mg in sterile water 10 mL IV syringe, 1,000 mg, IntraVENous, Q24H  hydrALAZINE (APRESOLINE) injection 10 mg, 10 mg, IntraVENous, Q8H PRN  Physical    VITALS:  BP (!) 164/87   Pulse 121   Temp 98.1 °F (36.7 °C) (Oral)   Resp 16   Ht 5' 8\" (1.727 m)   Wt 137 lb (62.1 kg)   SpO2 98%   BMI 20.83 kg/m²   CONSTITUTIONAL:  awake, alert, cooperative, no apparent distress, and appears stated age  EYES:  extra-ocular muscles intact and vision intact  ENT:  normocepalic, without obvious abnormality  NECK:  skin normal  LUNGS:  no increased work of breathing, no retractions and clear to auscultation  CARDIOVASCULAR: tachycardic with regular rhythm  ABDOMEN:  Redness at epigastric region is improving, active bowel sounds  MUSCULOSKELETAL:  there is no redness, warmth, or swelling of the joints  NEUROLOGIC:  Mental Status Exam:  Level of Alertness:   awake  Orientation:   person, place, time  Motor Exam:  Motor exam is symmetrical 5 out of 5 all extremities bilaterally  SKIN:  no bruising or bleeding  Data    CBC:   Lab Results   Component Value Date    WBC 11.7 09/23/2021    RBC 4.13 09/23/2021    HGB 11.3 09/23/2021    HCT 35.2 09/23/2021    MCV 85.2 09/23/2021    MCH 27.4 09/23/2021    MCHC 32.1 09/23/2021    RDW 12.8 09/23/2021     09/23/2021    MPV 9.3 09/23/2021     CMP:    Lab Results   Component Value Date     09/23/2021    K 3.8 09/23/2021     09/23/2021    CO2 24 09/23/2021    BUN 13 09/23/2021    CREATININE 0.8 09/23/2021    GFRAA >60 09/23/2021    LABGLOM >60 09/23/2021    GLUCOSE 109 09/23/2021    CALCIUM 8.2 09/23/2021       ASSESSMENT AND PLAN        1. Sepsis due to cellulitis [facility])  Much improving  Continue current abx  I and D with culture per surgical team   Wound care    2. Tachyarrythmia: Heart rare oscillates between 80-130s  EKG showed normal sinus with PVC  Check mag  Consult placed to cardiology  Screen for drugs, check for UTI, if negative may check catecholamines    3.   Hypertension Essential:  Started patient on lisinopril and Metoprolol

## 2021-09-23 NOTE — ANESTHESIA PRE PROCEDURE
Department of Anesthesiology  Preprocedure Note       Name:  Cortney Sharpe   Age:  73 y.o.  :  1950                                          MRN:  [id number]         Date:  2021      Surgeon: Janet Felder):  Concepción Urias MD    Procedure: Procedure(s):  INCISION AND DRAINAGE ABDOMINAL WALL ABSCESS    Medications prior to admission:   Prior to Admission medications    Not on File       Current medications:    Current Facility-Administered Medications   Medication Dose Route Frequency Provider Last Rate Last Admin    0.9 % sodium chloride infusion   IntraVENous Continuous Pancho Powell MD        sodium chloride flush 0.9 % injection 5-40 mL  5-40 mL IntraVENous 2 times per day Pancho Powell MD   10 mL at 21 09    sodium chloride flush 0.9 % injection 5-40 mL  5-40 mL IntraVENous PRN Pancho Powell MD        0.9 % sodium chloride infusion  25 mL IntraVENous PRN Pancho Powell MD        enoxaparin (LOVENOX) injection 40 mg  40 mg SubCUTAneous Daily Pancho Powell MD   40 mg at 21    ondansetron (ZOFRAN-ODT) disintegrating tablet 4 mg  4 mg Oral Q8H PRN Pancho Powell MD   4 mg at 21    Or    ondansetron (ZOFRAN) injection 4 mg  4 mg IntraVENous Q6H PRN Pancho Powell MD        polyethylene glycol (GLYCOLAX) packet 17 g  17 g Oral Daily PRN Pancho Powell MD        acetaminophen (TYLENOL) tablet 650 mg  650 mg Oral Q6H PRN Pancho Powell MD        Or    acetaminophen (TYLENOL) suppository 650 mg  650 mg Rectal Q6H PRN Pancho Powell MD        vancomycin (VANCOCIN) 1,250 mg in dextrose 5 % 250 mL IVPB  1,250 mg IntraVENous Q24H Pancho Powell MD        cefTRIAXone (ROCEPHIN) 1,000 mg in sterile water 10 mL IV syringe  1,000 mg IntraVENous Q24H Pancho Powell MD   1,000 mg at 21 183    hydrALAZINE (APRESOLINE) injection 10 mg  10 mg IntraVENous Q8H PRN Pancho Powell MD           Allergies:  No Known Allergies    Problem List:    Patient Active Problem List   Diagnosis Code    Sepsis due to cellulitis ([facility] L03.90, A41.9       Past Medical History:  No past medical history on file. Past Surgical History:        Procedure Laterality Date    TONSILLECTOMY         Social History:    Social History     Tobacco Use    Smoking status: Never Smoker    Smokeless tobacco: Never Used   Substance Use Topics    Alcohol use: Yes     Comment: Socially                                Counseling given: Not Answered      Vital Signs (Current):   Vitals:    09/22/21 1904 09/22/21 1950 09/23/21 0245 09/23/21 0745   BP: (!) 162/84 (!) 145/91  (!) 164/87   Pulse:  118 70 121   Resp:  18  16   Temp:  98.1 °F (36.7 °C)  98.1 °F (36.7 °C)   TempSrc:  Oral  Oral   SpO2:  99%  98%   Weight:       Height:                                                  BP Readings from Last 3 Encounters:   09/23/21 (!) 164/87   09/22/21 (!) 96/56       NPO Status:                                                                                 BMI:   Wt Readings from Last 3 Encounters:   09/22/21 137 lb (62.1 kg)   09/22/21 137 lb (62.1 kg)     Body mass index is 20.83 kg/m². CBC:   Lab Results   Component Value Date    WBC 11.7 09/23/2021    RBC 4.13 09/23/2021    HGB 11.3 09/23/2021    HCT 35.2 09/23/2021    MCV 85.2 09/23/2021    RDW 12.8 09/23/2021     09/23/2021       CMP:   Lab Results   Component Value Date     09/23/2021    K 3.8 09/23/2021     09/23/2021    CO2 24 09/23/2021    BUN 13 09/23/2021    CREATININE 0.8 09/23/2021    GFRAA >60 09/23/2021    LABGLOM >60 09/23/2021    GLUCOSE 109 09/23/2021    CALCIUM 8.2 09/23/2021       POC Tests: No results for input(s): POCGLU, POCNA, POCK, POCCL, POCBUN, POCHEMO, POCHCT in the last 72 hours.     Coags: No results found for: PROTIME, INR, APTT    HCG (If Applicable): No results found for: PREGTESTUR, PREGSERUM, HCG, HCGQUANT     ABGs: No results found for: PHART, PO2ART, VOJ9REA, IDU4TYA, BEART, A7UBCHIK     Type & Screen (If Applicable):  No results found for: LABABO, LABRH    Drug/Infectious Status (If Applicable):  No results found for: HIV, HEPCAB    COVID-19 Screening (If Applicable): No results found for: COVID19        Anesthesia Evaluation  Patient summary reviewed and Nursing notes reviewed no history of anesthetic complications:   Airway: Mallampati: III  TM distance: >3 FB   Neck ROM: full  Mouth opening: < 3 FB Dental:          Pulmonary:   (+) decreased breath sounds,      (-) not a current smoker                          PE comment: Rales left base Cardiovascular:  Exercise tolerance: good (>4 METS),   (+) murmur (Grade I),     (-) past MI, CAD, CABG/stent, dysrhythmias,  angina and  CHF    ECG reviewed  Rhythm: regular  Rate: normal           Beta Blocker:  Not on Beta Blocker      ROS comment: Sepsis due to cellulitis    Sinus rhythm with frequent premature ventricular complexes  Otherwise normal ECG  No previous ECGs available     Neuro/Psych:   Negative Neuro/Psych ROS              GI/Hepatic/Renal: Neg GI/Hepatic/Renal ROS           ROS comment: CT results:  Poorly defined irregular fluid attenuation with skin thickening in the subcutaneous tissue with associated soft tissue swelling in the upper mid abdomen could represent cellulitis given patient's history.  Drainable  collection is not definitively seen.  At least clinical follow-up to resolution recommended.     There is adenopathy seen in the mesentery along the celiac axis scattered in the retroperitoneum into the pelvis.  These lymph nodes predominantly are just under criteria to be considered adenopathy but a few do meet criteria to be considered adenopathy.  These lymph nodes are nonspecific and could be reactive or malignant.  At least clinical correlation and a follow-up CT  recommended in 3 months.     Complex septated cystic mass left hemipelvis felt to be associated with the left ovary.  Further evaluation with non emergent ultrasound recommended.     Levoscoliosis lumbar spine with marked associated degenerative change along the right lateral aspect lumbar spine.     RECOMMENDATIONS:  4.5 cm indeterminate ovarian cyst.  Recommend prompt follow-up with pelvic US. .   Endo/Other: Negative Endo/Other ROS   (+) blood dyscrasia (Hbg 11.3 gm/dL): anemia:., .          Pt had no PAT visit       Abdominal:       Abdomen: tender. Vascular: negative vascular ROS. Other Findings:           Anesthesia Plan      MAC     ASA 3       Induction: intravenous. MIPS: Postoperative opioids intended and Prophylactic antiemetics administered. Anesthetic plan and risks discussed with patient. Plan discussed with CRNA.                   Alisia Blevins,    9/23/2021

## 2021-09-24 LAB
CORTISOL TOTAL: 20.83 MCG/DL (ref 2.68–18.4)
EKG ATRIAL RATE: 115 BPM
EKG P AXIS: 72 DEGREES
EKG P-R INTERVAL: 160 MS
EKG Q-T INTERVAL: 320 MS
EKG QRS DURATION: 88 MS
EKG QTC CALCULATION (BAZETT): 442 MS
EKG R AXIS: 33 DEGREES
EKG T AXIS: 57 DEGREES
EKG VENTRICULAR RATE: 115 BPM
GRAM STAIN ORDERABLE: NORMAL
GRAM STAIN ORDERABLE: NORMAL

## 2021-09-24 PROCEDURE — 6370000000 HC RX 637 (ALT 250 FOR IP): Performed by: PHYSICIAN ASSISTANT

## 2021-09-24 PROCEDURE — 99233 SBSQ HOSP IP/OBS HIGH 50: CPT | Performed by: INTERNAL MEDICINE

## 2021-09-24 PROCEDURE — 6370000000 HC RX 637 (ALT 250 FOR IP): Performed by: SURGERY

## 2021-09-24 PROCEDURE — 99222 1ST HOSP IP/OBS MODERATE 55: CPT | Performed by: INTERNAL MEDICINE

## 2021-09-24 PROCEDURE — 93005 ELECTROCARDIOGRAM TRACING: CPT | Performed by: PHYSICIAN ASSISTANT

## 2021-09-24 PROCEDURE — 6360000002 HC RX W HCPCS: Performed by: SURGERY

## 2021-09-24 PROCEDURE — 1200000000 HC SEMI PRIVATE

## 2021-09-24 PROCEDURE — 36415 COLL VENOUS BLD VENIPUNCTURE: CPT

## 2021-09-24 PROCEDURE — 82533 TOTAL CORTISOL: CPT

## 2021-09-24 PROCEDURE — APPSS60 APP SPLIT SHARED TIME 46-60 MINUTES: Performed by: PHYSICIAN ASSISTANT

## 2021-09-24 PROCEDURE — 2580000003 HC RX 258: Performed by: INTERNAL MEDICINE

## 2021-09-24 PROCEDURE — 2580000003 HC RX 258: Performed by: SURGERY

## 2021-09-24 RX ORDER — METOPROLOL TARTRATE 50 MG/1
50 TABLET, FILM COATED ORAL 2 TIMES DAILY
Status: DISCONTINUED | OUTPATIENT
Start: 2021-09-24 | End: 2021-09-27

## 2021-09-24 RX ORDER — SODIUM CHLORIDE 9 MG/ML
INJECTION, SOLUTION INTRAVENOUS CONTINUOUS
Status: ACTIVE | OUTPATIENT
Start: 2021-09-24 | End: 2021-09-25

## 2021-09-24 RX ADMIN — VANCOMYCIN HYDROCHLORIDE 1250 MG: 10 INJECTION, POWDER, LYOPHILIZED, FOR SOLUTION INTRAVENOUS at 16:01

## 2021-09-24 RX ADMIN — SODIUM CHLORIDE: 9 INJECTION, SOLUTION INTRAVENOUS at 17:42

## 2021-09-24 RX ADMIN — LISINOPRIL 40 MG: 20 TABLET ORAL at 10:00

## 2021-09-24 RX ADMIN — METOPROLOL TARTRATE 25 MG: 25 TABLET, FILM COATED ORAL at 10:00

## 2021-09-24 RX ADMIN — SODIUM CHLORIDE: 9 INJECTION, SOLUTION INTRAVENOUS at 22:46

## 2021-09-24 RX ADMIN — Medication 10 ML: at 10:01

## 2021-09-24 RX ADMIN — METOPROLOL TARTRATE 25 MG: 25 TABLET, FILM COATED ORAL at 11:14

## 2021-09-24 RX ADMIN — ENOXAPARIN SODIUM 40 MG: 40 INJECTION SUBCUTANEOUS at 10:00

## 2021-09-24 RX ADMIN — METOPROLOL TARTRATE 50 MG: 50 TABLET, FILM COATED ORAL at 20:33

## 2021-09-24 RX ADMIN — ACETAMINOPHEN 650 MG: 325 TABLET ORAL at 20:33

## 2021-09-24 RX ADMIN — ONDANSETRON 4 MG: 4 TABLET, ORALLY DISINTEGRATING ORAL at 03:42

## 2021-09-24 ASSESSMENT — PAIN SCALES - GENERAL
PAINLEVEL_OUTOF10: 0
PAINLEVEL_OUTOF10: 0

## 2021-09-24 NOTE — PROGRESS NOTES
Department of Internal Medicine  General Internal Medicine  Attending Progress Note  Chief Complaint   Patient presents with    Wound Infection     started as skin tear     SUBJECTIVE:    Reports that she feels much better. No fever or chills. No chest pain. She is aware of plans to continue current abx and await for wound culture and sensitivity to result.      OBJECTIVE      Medications    Current Facility-Administered Medications: metoprolol tartrate (LOPRESSOR) tablet 50 mg, 50 mg, Oral, BID  lisinopril (PRINIVIL;ZESTRIL) tablet 40 mg, 40 mg, Oral, Daily  oxyCODONE (ROXICODONE) immediate release tablet 5 mg, 5 mg, Oral, Q4H PRN **OR** oxyCODONE (ROXICODONE) immediate release tablet 10 mg, 10 mg, Oral, Q4H PRN  HYDROmorphone (DILAUDID) injection 0.5 mg, 0.5 mg, IntraVENous, Q3H PRN  sodium chloride flush 0.9 % injection 5-40 mL, 5-40 mL, IntraVENous, 2 times per day  sodium chloride flush 0.9 % injection 5-40 mL, 5-40 mL, IntraVENous, PRN  0.9 % sodium chloride infusion, 25 mL, IntraVENous, PRN  enoxaparin (LOVENOX) injection 40 mg, 40 mg, SubCUTAneous, Daily  ondansetron (ZOFRAN-ODT) disintegrating tablet 4 mg, 4 mg, Oral, Q8H PRN **OR** ondansetron (ZOFRAN) injection 4 mg, 4 mg, IntraVENous, Q6H PRN  polyethylene glycol (GLYCOLAX) packet 17 g, 17 g, Oral, Daily PRN  acetaminophen (TYLENOL) tablet 650 mg, 650 mg, Oral, Q6H PRN **OR** acetaminophen (TYLENOL) suppository 650 mg, 650 mg, Rectal, Q6H PRN  vancomycin (VANCOCIN) 1,250 mg in dextrose 5 % 250 mL IVPB, 1,250 mg, IntraVENous, Q24H  hydrALAZINE (APRESOLINE) injection 10 mg, 10 mg, IntraVENous, Q8H PRN  Physical    VITALS:  BP (!) 154/72   Pulse 92   Temp 98.8 °F (37.1 °C) (Oral)   Resp 18   Ht 5' 8\" (1.727 m)   Wt 137 lb (62.1 kg)   SpO2 97%   BMI 20.83 kg/m²   CONSTITUTIONAL:  awake, alert, cooperative, no apparent distress, and appears stated age  EYES:  Lids and lashes normal, pupils equal, round and reactive to light, extra ocular muscles intact, sclera clear, conjunctiva normal  ENT:  normocepalic, without obvious abnormality  BACK:  symmetric  LUNGS:  No increased work of breathing, good air exchange, clear to auscultation bilaterally, no crackles or wheezing  CARDIOVASCULAR:  normal S1 and S2 and no edema  ABDOMEN:  normal bowel sounds, soft and non-distended  MUSCULOSKELETAL:  there is no redness, warmth, or swelling of the joints  NEUROLOGIC:  Mental Status Exam:  Level of Alertness:   awake  Motor Exam:  Motor exam is symmetrical 5 out of 5 all extremities bilaterally  SKIN:  No focal neuro deficit  Data    CBC:   Lab Results   Component Value Date    WBC 11.7 09/23/2021    RBC 4.13 09/23/2021    HGB 11.3 09/23/2021    HCT 35.2 09/23/2021    MCV 85.2 09/23/2021    MCH 27.4 09/23/2021    MCHC 32.1 09/23/2021    RDW 12.8 09/23/2021     09/23/2021    MPV 9.3 09/23/2021     CMP:    Lab Results   Component Value Date     09/23/2021    K 3.8 09/23/2021     09/23/2021    CO2 24 09/23/2021    BUN 13 09/23/2021    CREATININE 0.8 09/23/2021    GFRAA >60 09/23/2021    LABGLOM >60 09/23/2021    GLUCOSE 109 09/23/2021    CALCIUM 8.2 09/23/2021       ASSESSMENT AND PLAN        1. Sepsis due to cellulitis ([address]    2.  Abdominal wall cellulitis    3. Hypertension Essential:    Plans:  Wound culture is positive for staph aureus. Will await for further identification. Continue to monitor  Continue lisinopril and metoprolol.   Continue to adjust medication dose for optimal BP control

## 2021-09-24 NOTE — CARE COORDINATION
9/24/2021 1224 CM note: NO COVID TESTING. Follow up visit made to patient. Pt reports that surgeon may apply wound vac on Monday. Pt agreeable to Tommy  and prefers [facility]. Referral placed to [facility] at [facility], pt accepted and can start services Tuesday 9/28/21-[facility]ILL [facility]. [employer], notified of possible wound vac need-WILL NEED SIGNED WOUND VAC RX(placed in soft chart) then [employer] will run insurance benefits. Plan is for pt to return home with [facility]. CM/SW to follow for wound vac needs/HHC orders and possible abx needs.  Shun ARAIZA

## 2021-09-24 NOTE — PROGRESS NOTES
Left voicemail and [employer] message related to ordered WOUND CARE RN CONSULT for [employer].

## 2021-09-24 NOTE — PROGRESS NOTES
Inpatient wound care note  Dressing changed per physician today  Dressing is intact    If wound vac is planned for discharge  RX needs signed  Pt will need homecare

## 2021-09-24 NOTE — CONSULTS
Inpatient Cardiology Consultation      Reason for Consult:  arrhythmia    Consulting Physician: Tomi Adan MD    Requesting Physician:  Gentry Calderon MD    Date of Consultation: 9/24/2021    HISTORY OF PRESENT ILLNESS OF Amanda Shahid located in  room 0519/5333-22:     Amanda Shahid is a 73 y.o. female  unknown to [facility] who denies having any medical problems prior to this admission for an abscess. She denies taking any medications at home. She states that she did all work at house or around the house without any problems. She was able to hike without any problems to. She states that she feels anxious being in the hospital since she never had been previously in the hospital.  Patient underwent on 9/23/2021 IRRIGATION AND EXCISIONAL DEBRIDEMENT OF ABDOMINAL WALL ABSCESS. She denied having any chest pain, shortness of breath, lightheadedness, fevers, chills. She feels anxious and periodically feels her heart racing. Past Medical History:    No past medical history on file.     Past Surgical History:    Past Surgical History:   Procedure Laterality Date    TONSILLECTOMY         Medications Prior to admit:  Prior to Admission medications    Not on File       Current Medications:    Current Facility-Administered Medications: 0.9 % sodium chloride infusion, , IntraVENous, Continuous  metoprolol tartrate (LOPRESSOR) tablet 25 mg, 25 mg, Oral, BID  lisinopril (PRINIVIL;ZESTRIL) tablet 40 mg, 40 mg, Oral, Daily  oxyCODONE (ROXICODONE) immediate release tablet 5 mg, 5 mg, Oral, Q4H PRN **OR** oxyCODONE (ROXICODONE) immediate release tablet 10 mg, 10 mg, Oral, Q4H PRN  HYDROmorphone (DILAUDID) injection 0.5 mg, 0.5 mg, IntraVENous, Q3H PRN  sodium chloride flush 0.9 % injection 5-40 mL, 5-40 mL, IntraVENous, 2 times per day  sodium chloride flush 0.9 % injection 5-40 mL, 5-40 mL, IntraVENous, PRN  0.9 % sodium chloride infusion, 25 mL, IntraVENous, PRN  enoxaparin (LOVENOX) injection 40 mg, 40 Member of Clubs or Organizations:     Attends Club or Organization Meetings:     Marital Status:    Intimate Partner Violence:     Fear of Current or Ex-Partner:     Emotionally Abused:     Physically Abused:     Sexually Abused:        Family History: Denies h/o  MIs in her family. No family history on file. REVIEW OF SYSTEMS:     Constitutional: Denies fatigue, fevers, chills, night sweats, beverly loss, beverly gain  HEENT: Denies headaches, nose bleeds, and blurred vision,oral pain, oral lesion. Neurological: Denies history of stroke, weakness, dizziness and lightheadedness, numbness and tingling  Cardiovascular: Denies chest pain,SOB at effort, SOB at rest, SOB when lying flat,  palpitations, and feelings of heart racing. Respiratory: Denies cough, wheezing,  use of supplementary oxygen, CPAP/BiPAP  Gastrointestinal: Denies heartburn, nausea, vomiting, diarrhea and constipation, blood and black/ tarry stools. Genitourinary: Denies pain on  urination,  blood in urine, trouble starting urination, need to strain on urination, urinary urgency, urinary incontinence. Hematologic: Denies history of easy bruising, prolonged bleeding,  of blood clots in legs  Lymphatic: Denies lumps and bumps to neck, axilla, breast, and groin  Endocrine: Denies excessive thirst. Denies intolerance to heat or cold  Musculoskeletal: Denies falls, pain to BLE with ambulation and edema to BLE. Psychiatric: Has anxiety since in hospital.  Denies depression. PHYSICAL EXAM:   BP (!) 172/81   Pulse 87   Temp 98.8 °F (37.1 °C) (Oral)   Resp 18   Ht 5' 8\" (1.727 m)   Wt 137 lb (62.1 kg)   SpO2 97%   BMI 92.34 kg/m²   Systolic (30BRV), MHD:422 , Min:124 , FC    Diastolic (54NBL), XUK:68, Min:60, Max:105    CONST:  Well developed, well nourished who appears stated age.  Awake, alert, cooperative, no apparent distress  HEENT:   Head- Normocephalic, atraumatic   Eyes- Conjunctivae pink, anicteric  Throat- Oral mucosa pink and moist  Neck-  No stridor, trachea midline, no jugular venous distention. No adenopathy   CHEST: Chest symmetrical and non-tender to palpation. No accessory muscle use or intercostal retractions  RESPIRATORY:  Lung sounds - clear throughout fields;  CARDIOVASCULAR:     No carotid bruits  Heart Inspection- shows no noted pulsations    Heart Ausculation- Regular  rate and rhythm, tachycardia, no murmur. No s3, s4 or rub   PV: No lower extremity edema. No varicosities. Pedal pulses palpable, no clubbing or cyanosis   ABDOMEN: Mid abdominal dressing present. Soft, tender to light palpation. Bowel sounds present. MS: Moves all extremities Good muscle strength and tone. No atrophy or abnormal movements. : Deferred  SKIN: Warm and dry,  no stasis dermatitis or ulcers on legs  NEURO / PSYCH: Oriented to person, place and time. Speech clear and appropriate. Follows all commands. Pleasant affect     DATA:    ECG reviewed with Dr. Katherine Mazariegos strips: Sinus rhythm, episodes of sinus tachycardia  Diagnostic:      CT ABDOMEN PELVIS W IV CONTRAST Additional Contrast? None    Result Date: 9/22/2021  EXAMINATION: CT OF THE ABDOMEN AND PELVIS WITH CONTRAST 9/22/2021 12:44 pm TECHNIQUE: CT of the abdomen and pelvis was performed with the administration of intravenous contrast. Multiplanar reformatted images are provided for review. Dose modulation, iterative reconstruction, and/or weight based adjustment of the mA/kV was utilized to reduce the radiation dose to as low as reasonably achievable. COMPARISON: None.  HISTORY: ORDERING SYSTEM PROVIDED HISTORY: epigastric area infection, swelling, induration, erythema TECHNOLOGIST PROVIDED HISTORY: Reason for exam:->epigastric area infection, swelling, induration, erythema Additional Contrast?->None Decision Support Exception - unselect if not a suspected or confirmed emergency medical condition->Emergency Medical Condition (MA) FINDINGS: Lower Chest: There are no pleural adenopathy but a few do meet criteria to be considered adenopathy. These lymph nodes are nonspecific and could be reactive or malignant. At least clinical correlation and a follow-up CT recommended in 3 months. Complex septated cystic mass left hemipelvis felt to be associated with the left ovary. Further evaluation with non emergent ultrasound recommended. Levoscoliosis lumbar spine with marked associated degenerative change along the right lateral aspect lumbar spine. RECOMMENDATIONS: 4.5 cm indeterminate ovarian cyst.  Recommend prompt follow-up with pelvic US. Reference: J Am Lamin Radiol 6410;60:[id number]         Labs:   CARDIAC ENZYMES:  No results for input(s): CKTOTAL, CKMB, CKMBINDEX, TROPHS in the last 72 hours. H/O TROPONIN levels    No results found for: TROPHS, TROPONINI  No results for input(s): PROBNP in the last 72 hours. No results found for: PROBNP  BMP:   Recent Labs     09/22/21  1140 09/23/21  0756    137   K 4.0 3.8   CO2 23 24   BUN 18 13   CREATININE 0.9 0.8   LABGLOM >60 >60   CALCIUM 9.3 8.2*     Lab Results   Component Value Date    CREATININE 0.8 09/23/2021    CREATININE 0.9 09/22/2021     CBC:   Recent Labs     09/22/21  1140 09/23/21  0756   WBC 14.9* 11.7*   HGB 13.8 11.3*   HCT 41.2 35.2    263     Mag:   Recent Labs     09/23/21  0756   MG 2.0     Phos: No results for input(s): PHOS in the last 72 hours. TSH:   Recent Labs     09/22/21  1140   TSH 1.950     HgA1c:   Lab Results   Component Value Date    LABA1C 5.6 09/22/2021     No results found for: EAG  BNP: No results for input(s): BNP in the last 72 hours. PT/INR: No results for input(s): PROTIME, INR in the last 72 hours. APTT:No results for input(s): APTT in the last 72 hours.     FASTING LIPID PANEL:  Lab Results   Component Value Date    CHOL 159 09/23/2021    HDL 31 09/23/2021    LDLCALC 109 09/23/2021    TRIG 93 09/23/2021     LIVER PROFILE:No results for input(s): AST, ALT, LABALBU in the last 72 hours.    COVID-19 Labs:  No results found for: COVID19  No results for input(s): COVID19 in the last 72 hours. ASSESSMENT:  Sinus tachycardia, asymptomatic, probably secondary to anxiety and/or infection  Elevated blood pressure, PT denies h/o HTN, could be secondary to anxiety  Abdominal abscess,s/p RRIGATION AND EXCISIONAL DEBRIDEMENT OF ABDOMINAL WALL ABSCESS on 9/23/2021 I. PLAN:  Continue Telemetry  Increase beta-blocker dosage  Electrolytes check and correction      Assessment and plan discussed with Dr. Rajinder Ricardo MD    Assessment and Plan to follow as per Dr. Rajinder Ricardo MD    Electronically signed by TROY Woodson on 9/24/2021 at 8:08 [id number] [facility] [place] [facility] Consult       Ayalaflower Cates    I have personally participated in a face-to-face and personally obtained history and performed physical exam on the date of service. I reviewed chart, vitals, labs and radiologic studies. I also participated in medical decision making with TROY Woodson on the date of service All of the assessments and recommendations are from me and I agree with all of the pertinent clinical information, assessment and treatment plan. I have reviewed and edited the note above based on my findings during my history, exam, and decision making. Please see my additional contributions to the history, physical exam, assessment, and recommendations below. HISTORY OF PRESENT ILLNESS:     Reviewed, as above    Past medical history:  Reviewed, as above. Past surgical history:  Reviewed, as above. Current medications:  Reviewed, as above    Allergies:  Reviewed, as above    Social history:  Reviewed, as above    Family medical history:  Reviewed, as above. REVIEW OF SYSTEMS:   Reviewed, as above.     PHYSICAL EXAM:   CONSTITUTIONAL:  awake, alert, cooperative, no apparent distress, and appears stated age  EYES:  lids and lashes normal and pupils equal, round and reactive to light, anicteric sclerae  HEAD: normocepalic, without obvious abnormality, atraumatic, pink, moist mucous membranes. NECK:  Supple, symmetrical, trachea midline, no adenopathy, thyroid symmetric, not enlarged and no tenderness, skin normal  HEMATOLOGIC/LYMPHATICS:  no cervical lymphadenopathy and no supraclavicular lymphadenopathy  LUNGS:  No increased work of breathing, good air exchange, clear to auscultation bilaterally, no crackles or wheezing  CARDIOVASCULAR:  Normal apical impulse, tachycardic, normal S1 and S2, no S3 or S4, and no murmur noted and no JVD, no carotid bruit, no pedal edema, good carotid upstroke bilaterally. ABDOMEN: Postsurgical, tender, BS+  CHEST: nontender to palpation, expands symmetrically  MUSCULOSKELETAL:  No clubbing no cyanosis. there is no redness, warmth, or swelling of the joints  full range of motion noted  NEUROLOGIC:  Alert, awake,oriented x3  SKIN:  no bruising or bleeding, normal skin color, texture, turgor and no redness, warmth, or swelling    BP (!) 154/72   Pulse 92   Temp 98.8 °F (37.1 °C) (Oral)   Resp 18   Ht 5' 8\" (1.727 m)   Wt 137 lb (62.1 kg)   SpO2 97%   BMI 20.83 kg/m²       I/O last 3 completed shifts: In: 600 [P.O.:600]  Out: -   No intake/output data recorded.       DATA:   I personally reviewed the admission EKG with the following interpretation: Sinus rhythm with frequent PVCs    ECHO: Not performed to date  Stress Test: Not performed to date  Angiography: Not performed to date  Cardiology Labs:   BMP:    Lab Results   Component Value Date     09/23/2021    K 3.8 09/23/2021     09/23/2021    CO2 24 09/23/2021    BUN 13 09/23/2021     CMP:    Lab Results   Component Value Date     09/23/2021    K 3.8 09/23/2021     09/23/2021    CO2 24 09/23/2021    BUN 13 09/23/2021     CBC:    Lab Results   Component Value Date    WBC 11.7 09/23/2021    RBC 4.13 09/23/2021    HGB 11.3 09/23/2021    HCT 35.2 09/23/2021    MCV 85.2 09/23/2021    RDW 12.8 09/23/2021     09/23/2021     PT/INR:  No results found for: PTINR  PT/INR Warfarin:  No components found for: PTPATWAR, PTINRWAR  PTT:  No results found for: APTT  PTT Heparin:  No components found for: APTTHEP  Magnesium:    Lab Results   Component Value Date    MG 2.0 09/23/2021     TSH:    Lab Results   Component Value Date    TSH 1.950 09/22/2021     TROPONIN:  No components found for: TROP  BNP:  No results found for: BNP  FASTING LIPID PANEL:    Lab Results   Component Value Date    CHOL 159 09/23/2021    HDL 31 09/23/2021    TRIG 93 09/23/2021     CT ABDOMEN PELVIS W IV CONTRAST Additional Contrast? None   Final Result   Poorly defined irregular fluid attenuation with skin thickening in the   subcutaneous tissue with associated soft tissue swelling in the upper mid   abdomen could represent cellulitis given patient's history. Drainable   collection is not definitively seen. At least clinical follow-up to   resolution recommended. There is adenopathy seen in the mesentery along the celiac axis scattered in   the retroperitoneum into the pelvis. These lymph nodes predominantly are   just under criteria to be considered adenopathy but a few do meet criteria to   be considered adenopathy. These lymph nodes are nonspecific and could be   reactive or malignant. At least clinical correlation and a follow-up CT   recommended in 3 months. Complex septated cystic mass left hemipelvis felt to be associated with the   left ovary. Further evaluation with non emergent ultrasound recommended. Levoscoliosis lumbar spine with marked associated degenerative change along   the right lateral aspect lumbar spine. RECOMMENDATIONS:   4.5 cm indeterminate ovarian cyst.  Recommend prompt follow-up with pelvic US. Reference: J Am Lamin Radiol 0714;88:350-705             I have personally reviewed the laboratory, cardiac diagnostic and radiographic testing as outlined above:    IMPRESSION:  1.   Sinus tachycardia: Secondary to comorbid conditions, including surgery, anxiety, will start gentle IV hydration, will check TSH.  2.  Occasional PVCs  3. Hypertension: Controlled  4. Abdominal wall abscess: Status post drainage    RECOMMENDATIONS:   1. Continue current treatment  2. IV hydration  3. TSH  4. Basic metabolic panel and CBC in a.m.  5. No further cardiac work-up is planned at this point of time    I have reviewed my findings and recommendations with patient    Thank you for the consult  Electronically signed by Meeta Wilkins MD on 9/24/2021 at 3:50 PM  NOTE: This report was transcribed using voice recognition software.  Every effort was made to ensure accuracy; however, inadvertent computerized transcription errors may be present

## 2021-09-24 NOTE — PROGRESS NOTES
GENERAL SURGERY  DAILY PROGRESS NOTE  9/24/2021    Chief Complaint   Patient presents with    Wound Infection     started as skin tear       Subjective:  Doing well this morning pain is controlled no nausea or vomiting. No fevers or chills overnight.     Objective:  BP (!) 154/74   Pulse 80   Temp 99 °F (37.2 °C) (Oral)   Resp 16   Ht 5' 8\" (1.727 m)   Wt 137 lb (62.1 kg)   SpO2 99%   BMI 20.83 kg/m²     GENERAL:  Laying in bed, awake, alert, cooperative, no apparent distress  HEAD: Normocephalic, atraumatic  EYES: No sclera icterus, pupils equal  LUNGS:  No increased work of breathing  CARDIOVASCULAR:  RR  ABDOMEN:  Soft, non-tender, non-distended  EXTREMITIES: No edema or swelling  SKIN: Upper abdomen elliptical incision through subcutaneous tissue with clean wound edges no signs of necrotic material, some slight oozing    Assessment/Plan:  73 y.o. female with necrotizing soft tissue infection of the upper abdomen    Status post excisional debridement  Local wound care with wound care consult  IV antibiotics per primary  Once wound base is clear would recommend wound VAC placement    Electronically signed by Alpa Fuller MD on 9/24/2021 at 6:32 AM    As above  Await culture speciation  Wound care following - recommend wound vac

## 2021-09-25 LAB
ANAEROBIC CULTURE: NORMAL
ANAEROBIC CULTURE: NORMAL
ANION GAP SERPL CALCULATED.3IONS-SCNC: 7 MMOL/L (ref 7–16)
ANISOCYTOSIS: ABNORMAL
BASOPHILS ABSOLUTE: 0.05 E9/L (ref 0–0.2)
BASOPHILS RELATIVE PERCENT: 1.3 % (ref 0–2)
BUN BLDV-MCNC: 11 MG/DL (ref 6–23)
BURR CELLS: ABNORMAL
CALCIUM SERPL-MCNC: 7.8 MG/DL (ref 8.6–10.2)
CHLORIDE BLD-SCNC: 106 MMOL/L (ref 98–107)
CO2: 24 MMOL/L (ref 22–29)
CREAT SERPL-MCNC: 0.9 MG/DL (ref 0.5–1)
CULTURE SURGICAL: ABNORMAL
CULTURE SURGICAL: ABNORMAL
EOSINOPHILS ABSOLUTE: 0.02 E9/L (ref 0.05–0.5)
EOSINOPHILS RELATIVE PERCENT: 0.4 % (ref 0–6)
GFR AFRICAN AMERICAN: >60
GFR NON-AFRICAN AMERICAN: >60 ML/MIN/1.73
GLUCOSE BLD-MCNC: 106 MG/DL (ref 74–99)
HCT VFR BLD CALC: 33.1 % (ref 34–48)
HEMOGLOBIN: 10.6 G/DL (ref 11.5–15.5)
LYMPHOCYTES ABSOLUTE: 0.3 E9/L (ref 1.5–4)
LYMPHOCYTES RELATIVE PERCENT: 8.3 % (ref 20–42)
MCH RBC QN AUTO: 27.4 PG (ref 26–35)
MCHC RBC AUTO-ENTMCNC: 32 % (ref 32–34.5)
MCV RBC AUTO: 85.5 FL (ref 80–99.9)
METAMYELOCYTES RELATIVE PERCENT: 0.4 % (ref 0–1)
MONOCYTES ABSOLUTE: 0.19 E9/L (ref 0.1–0.95)
MONOCYTES RELATIVE PERCENT: 4.8 % (ref 2–12)
MYELOCYTE PERCENT: 2.2 % (ref 0–0)
NEUTROPHILS ABSOLUTE: 3.23 E9/L (ref 1.8–7.3)
NEUTROPHILS RELATIVE PERCENT: 82.5 % (ref 43–80)
ORGANISM: ABNORMAL
ORGANISM: ABNORMAL
OVALOCYTES: ABNORMAL
PDW BLD-RTO: 12.7 FL (ref 11.5–15)
PLATELET # BLD: 244 E9/L (ref 130–450)
PMV BLD AUTO: 9.3 FL (ref 7–12)
POIKILOCYTES: ABNORMAL
POTASSIUM SERPL-SCNC: 3.7 MMOL/L (ref 3.5–5)
RBC # BLD: 3.87 E12/L (ref 3.5–5.5)
SODIUM BLD-SCNC: 137 MMOL/L (ref 132–146)
URINE CULTURE, ROUTINE: NORMAL
WBC # BLD: 3.8 E9/L (ref 4.5–11.5)

## 2021-09-25 PROCEDURE — 36415 COLL VENOUS BLD VENIPUNCTURE: CPT

## 2021-09-25 PROCEDURE — 2580000003 HC RX 258: Performed by: SURGERY

## 2021-09-25 PROCEDURE — 1200000000 HC SEMI PRIVATE

## 2021-09-25 PROCEDURE — 6360000002 HC RX W HCPCS: Performed by: SURGERY

## 2021-09-25 PROCEDURE — 6370000000 HC RX 637 (ALT 250 FOR IP): Performed by: SURGERY

## 2021-09-25 PROCEDURE — 99232 SBSQ HOSP IP/OBS MODERATE 35: CPT | Performed by: INTERNAL MEDICINE

## 2021-09-25 PROCEDURE — 6370000000 HC RX 637 (ALT 250 FOR IP): Performed by: PHYSICIAN ASSISTANT

## 2021-09-25 PROCEDURE — 85025 COMPLETE CBC W/AUTO DIFF WBC: CPT

## 2021-09-25 PROCEDURE — 80048 BASIC METABOLIC PNL TOTAL CA: CPT

## 2021-09-25 RX ADMIN — ACETAMINOPHEN 650 MG: 325 TABLET ORAL at 13:33

## 2021-09-25 RX ADMIN — OXYCODONE 5 MG: 5 TABLET ORAL at 13:33

## 2021-09-25 RX ADMIN — LISINOPRIL 40 MG: 20 TABLET ORAL at 08:51

## 2021-09-25 RX ADMIN — ENOXAPARIN SODIUM 40 MG: 40 INJECTION SUBCUTANEOUS at 08:51

## 2021-09-25 RX ADMIN — METOPROLOL TARTRATE 50 MG: 50 TABLET, FILM COATED ORAL at 08:51

## 2021-09-25 RX ADMIN — METOPROLOL TARTRATE 50 MG: 50 TABLET, FILM COATED ORAL at 21:29

## 2021-09-25 RX ADMIN — Medication 10 ML: at 13:45

## 2021-09-25 RX ADMIN — VANCOMYCIN HYDROCHLORIDE 1250 MG: 10 INJECTION, POWDER, LYOPHILIZED, FOR SOLUTION INTRAVENOUS at 13:33

## 2021-09-25 ASSESSMENT — PAIN SCALES - GENERAL
PAINLEVEL_OUTOF10: 3
PAINLEVEL_OUTOF10: 0
PAINLEVEL_OUTOF10: 5
PAINLEVEL_OUTOF10: 0

## 2021-09-25 NOTE — PROGRESS NOTES
Department of Internal Medicine  General Internal Medicine  Attending Progress Note  Chief Complaint   Patient presents with    Wound Infection     started as skin tear     SUBJECTIVE:    Reports that she feel better. She denied fever and chills. Noted no drainage or blood noticed on the dressing.  She is aware of plans to treat infection based sensitivity    OBJECTIVE      Medications    Current Facility-Administered Medications: metoprolol tartrate (LOPRESSOR) tablet 50 mg, 50 mg, Oral, BID  lisinopril (PRINIVIL;ZESTRIL) tablet 40 mg, 40 mg, Oral, Daily  oxyCODONE (ROXICODONE) immediate release tablet 5 mg, 5 mg, Oral, Q4H PRN **OR** oxyCODONE (ROXICODONE) immediate release tablet 10 mg, 10 mg, Oral, Q4H PRN  sodium chloride flush 0.9 % injection 5-40 mL, 5-40 mL, IntraVENous, 2 times per day  sodium chloride flush 0.9 % injection 5-40 mL, 5-40 mL, IntraVENous, PRN  0.9 % sodium chloride infusion, 25 mL, IntraVENous, PRN  enoxaparin (LOVENOX) injection 40 mg, 40 mg, SubCUTAneous, Daily  ondansetron (ZOFRAN-ODT) disintegrating tablet 4 mg, 4 mg, Oral, Q8H PRN **OR** ondansetron (ZOFRAN) injection 4 mg, 4 mg, IntraVENous, Q6H PRN  polyethylene glycol (GLYCOLAX) packet 17 g, 17 g, Oral, Daily PRN  acetaminophen (TYLENOL) tablet 650 mg, 650 mg, Oral, Q6H PRN **OR** acetaminophen (TYLENOL) suppository 650 mg, 650 mg, Rectal, Q6H PRN  vancomycin (VANCOCIN) 1,250 mg in dextrose 5 % 250 mL IVPB, 1,250 mg, IntraVENous, Q24H  hydrALAZINE (APRESOLINE) injection 10 mg, 10 mg, IntraVENous, Q8H PRN  Physical    VITALS:  BP (!) 151/78   Pulse 79   Temp 98.4 °F (36.9 °C) (Oral)   Resp 16   Ht 5' 8\" (1.727 m)   Wt 137 lb (62.1 kg)   SpO2 100%   BMI 20.83 kg/m²   CONSTITUTIONAL:  awake, alert, cooperative, no apparent distress, and appears stated age  EYES:  lids and lashes normal and extra-ocular muscles intact  ENT:  normocepalic, without obvious abnormality  NECK:  Supple, symmetrical, trachea midline, no adenopathy, thyroid symmetric, not enlarged and no tenderness, skin normal  LUNGS:  no increased work of breathing, no retractions and no crackles or wheezing  CARDIOVASCULAR:  Normal apical impulse, regular rate and rhythm, normal S1 and S2, no S3 or S4, and no murmur noted  NEUROLOGIC:  Mental Status Exam:  Level of Alertness:   awake  Orientation:   person, place, time  Motor Exam:  Motor exam is symmetrical 5 out of 5 all extremities bilaterally  SKIN:  no bruising or bleeding  Data    CBC:   Lab Results   Component Value Date    WBC 3.8 09/25/2021    RBC 3.87 09/25/2021    HGB 10.6 09/25/2021    HCT 33.1 09/25/2021    MCV 85.5 09/25/2021    MCH 27.4 09/25/2021    MCHC 32.0 09/25/2021    RDW 12.7 09/25/2021     09/25/2021    MPV 9.3 09/25/2021     BMP:    Lab Results   Component Value Date     09/25/2021    K 3.7 09/25/2021    K 3.8 09/23/2021     09/25/2021    CO2 24 09/25/2021    BUN 11 09/25/2021    CREATININE 0.9 09/25/2021    CALCIUM 7.8 09/25/2021    GFRAA >60 09/25/2021    LABGLOM >60 09/25/2021    GLUCOSE 106 09/25/2021       ASSESSMENT AND PLAN      1. Sepsis due to cellulitis ([address]    2.  Abdominal wall cellulitis    3. Sinus tachycardia    5. PVC's (premature ventricular contractions)    6. Essential hypertension    Plans; Wound culture is positive to Staph and this is sensitive to vanco  Continue current abx  Wound care team following. Check metanephrine level. Elevated cortisol. May check renal US.

## 2021-09-26 ENCOUNTER — APPOINTMENT (OUTPATIENT)
Dept: ULTRASOUND IMAGING | Age: 71
DRG: 854 | End: 2021-09-26
Payer: MEDICARE

## 2021-09-26 LAB
ANION GAP SERPL CALCULATED.3IONS-SCNC: 8 MMOL/L (ref 7–16)
BUN BLDV-MCNC: 8 MG/DL (ref 6–23)
CALCIUM SERPL-MCNC: 8.3 MG/DL (ref 8.6–10.2)
CHLORIDE BLD-SCNC: 102 MMOL/L (ref 98–107)
CO2: 25 MMOL/L (ref 22–29)
CREAT SERPL-MCNC: 0.9 MG/DL (ref 0.5–1)
GFR AFRICAN AMERICAN: >60
GFR NON-AFRICAN AMERICAN: >60 ML/MIN/1.73
GLUCOSE BLD-MCNC: 104 MG/DL (ref 74–99)
POTASSIUM SERPL-SCNC: 4.2 MMOL/L (ref 3.5–5)
SODIUM BLD-SCNC: 135 MMOL/L (ref 132–146)

## 2021-09-26 PROCEDURE — 76770 US EXAM ABDO BACK WALL COMP: CPT

## 2021-09-26 PROCEDURE — 99233 SBSQ HOSP IP/OBS HIGH 50: CPT | Performed by: INTERNAL MEDICINE

## 2021-09-26 PROCEDURE — 6370000000 HC RX 637 (ALT 250 FOR IP): Performed by: SURGERY

## 2021-09-26 PROCEDURE — 80048 BASIC METABOLIC PNL TOTAL CA: CPT

## 2021-09-26 PROCEDURE — 6360000002 HC RX W HCPCS: Performed by: SURGERY

## 2021-09-26 PROCEDURE — 6370000000 HC RX 637 (ALT 250 FOR IP): Performed by: PHYSICIAN ASSISTANT

## 2021-09-26 PROCEDURE — 83835 ASSAY OF METANEPHRINES: CPT

## 2021-09-26 PROCEDURE — 36415 COLL VENOUS BLD VENIPUNCTURE: CPT

## 2021-09-26 PROCEDURE — 2580000003 HC RX 258: Performed by: SURGERY

## 2021-09-26 PROCEDURE — 1200000000 HC SEMI PRIVATE

## 2021-09-26 RX ADMIN — LISINOPRIL 40 MG: 20 TABLET ORAL at 08:52

## 2021-09-26 RX ADMIN — VANCOMYCIN HYDROCHLORIDE 1250 MG: 10 INJECTION, POWDER, LYOPHILIZED, FOR SOLUTION INTRAVENOUS at 14:20

## 2021-09-26 RX ADMIN — Medication 10 ML: at 08:53

## 2021-09-26 RX ADMIN — ENOXAPARIN SODIUM 40 MG: 40 INJECTION SUBCUTANEOUS at 08:53

## 2021-09-26 RX ADMIN — Medication 10 ML: at 20:38

## 2021-09-26 RX ADMIN — METOPROLOL TARTRATE 50 MG: 50 TABLET, FILM COATED ORAL at 20:38

## 2021-09-26 RX ADMIN — OXYCODONE 10 MG: 5 TABLET ORAL at 14:17

## 2021-09-26 RX ADMIN — METOPROLOL TARTRATE 50 MG: 50 TABLET, FILM COATED ORAL at 08:53

## 2021-09-26 ASSESSMENT — PAIN SCALES - GENERAL
PAINLEVEL_OUTOF10: 7
PAINLEVEL_OUTOF10: 0
PAINLEVEL_OUTOF10: 0

## 2021-09-26 NOTE — PROGRESS NOTES
Department of Internal Medicine  General Internal Medicine  Attending Progress Note  Chief Complaint   Patient presents with    Wound Infection     started as skin tear     SUBJECTIVE:    Reports that he is doing well. No fever or chills. I did discuss with her the recent elevated BP despite being on two medications. She attributed this to anxiety. I did tell her that it will be a good idea for us to get US of her kidney and she is agreeable to this.      OBJECTIVE      Medications    Current Facility-Administered Medications: metoprolol tartrate (LOPRESSOR) tablet 50 mg, 50 mg, Oral, BID  lisinopril (PRINIVIL;ZESTRIL) tablet 40 mg, 40 mg, Oral, Daily  oxyCODONE (ROXICODONE) immediate release tablet 5 mg, 5 mg, Oral, Q4H PRN **OR** oxyCODONE (ROXICODONE) immediate release tablet 10 mg, 10 mg, Oral, Q4H PRN  sodium chloride flush 0.9 % injection 5-40 mL, 5-40 mL, IntraVENous, 2 times per day  sodium chloride flush 0.9 % injection 5-40 mL, 5-40 mL, IntraVENous, PRN  0.9 % sodium chloride infusion, 25 mL, IntraVENous, PRN  enoxaparin (LOVENOX) injection 40 mg, 40 mg, SubCUTAneous, Daily  ondansetron (ZOFRAN-ODT) disintegrating tablet 4 mg, 4 mg, Oral, Q8H PRN **OR** ondansetron (ZOFRAN) injection 4 mg, 4 mg, IntraVENous, Q6H PRN  polyethylene glycol (GLYCOLAX) packet 17 g, 17 g, Oral, Daily PRN  acetaminophen (TYLENOL) tablet 650 mg, 650 mg, Oral, Q6H PRN **OR** acetaminophen (TYLENOL) suppository 650 mg, 650 mg, Rectal, Q6H PRN  vancomycin (VANCOCIN) 1,250 mg in dextrose 5 % 250 mL IVPB, 1,250 mg, IntraVENous, Q24H  hydrALAZINE (APRESOLINE) injection 10 mg, 10 mg, IntraVENous, Q8H PRN  Physical    VITALS:  BP (!) 188/92   Pulse 87   Temp 97.7 °F (36.5 °C) (Oral)   Resp 16   Ht 5' 8\" (1.727 m)   Wt 137 lb (62.1 kg)   SpO2 99%   BMI 20.83 kg/m²   CONSTITUTIONAL:  awake, alert, cooperative, no apparent distress, and appears stated age  EYES:  Lids and lashes normal, pupils equal, round and reactive to light, extra ocular muscles intact, sclera clear, conjunctiva normal  ENT:  normocepalic, without obvious abnormality  NECK:  supple, symmetrical, trachea midline  LUNGS:  No increased work of breathing, good air exchange, clear to auscultation bilaterally, no crackles or wheezing  CARDIOVASCULAR:  normal apical pulses and normal S1 and S2  ABDOMEN:  normal bowel sounds, soft and no masses palpated  MUSCULOSKELETAL:  there is no redness, warmth, or swelling of the joints  NEUROLOGIC:  Mental Status Exam:  Level of Alertness:   awake  Orientation:   person, place, time  Motor Exam:  Motor exam is symmetrical 5 out of 5 all extremities bilaterally  SKIN:  no bruising or bleeding  Data    CBC:   Lab Results   Component Value Date    WBC 3.8 09/25/2021    RBC 3.87 09/25/2021    HGB 10.6 09/25/2021    HCT 33.1 09/25/2021    MCV 85.5 09/25/2021    MCH 27.4 09/25/2021    MCHC 32.0 09/25/2021    RDW 12.7 09/25/2021     09/25/2021    MPV 9.3 09/25/2021     BMP:    Lab Results   Component Value Date     09/26/2021    K 4.2 09/26/2021    K 3.8 09/23/2021     09/26/2021    CO2 25 09/26/2021    BUN 8 09/26/2021    CREATININE 0.9 09/26/2021    CALCIUM 8.3 09/26/2021    GFRAA >60 09/26/2021    LABGLOM >60 09/26/2021    GLUCOSE 104 09/26/2021       ASSESSMENT AND PLAN      1. Sepsis due to cellulitis ([address]    2.  Abdominal wall cellulitis    3. Sinus tachycardia    4. PVC's (premature ventricular contractions)    5. Essential hypertension    Plans:   Renal US to evaluate for any secondary cause  Continue Vanco as wound grew positive for MRSA. Monitor vanco level in am  Check metanephrine.

## 2021-09-27 LAB
BLOOD CULTURE, ROUTINE: NORMAL
CULTURE, BLOOD 2: NORMAL
HCT VFR BLD CALC: 34.4 % (ref 34–48)
HEMOGLOBIN: 11.5 G/DL (ref 11.5–15.5)
MCH RBC QN AUTO: 27.5 PG (ref 26–35)
MCHC RBC AUTO-ENTMCNC: 33.4 % (ref 32–34.5)
MCV RBC AUTO: 82.3 FL (ref 80–99.9)
PDW BLD-RTO: 12.5 FL (ref 11.5–15)
PLATELET # BLD: 281 E9/L (ref 130–450)
PMV BLD AUTO: 9.2 FL (ref 7–12)
RBC # BLD: 4.18 E12/L (ref 3.5–5.5)
VANCOMYCIN TROUGH: 12.9 MCG/ML (ref 5–16)
WBC # BLD: 6.1 E9/L (ref 4.5–11.5)

## 2021-09-27 PROCEDURE — 82384 ASSAY THREE CATECHOLAMINES: CPT

## 2021-09-27 PROCEDURE — 36415 COLL VENOUS BLD VENIPUNCTURE: CPT

## 2021-09-27 PROCEDURE — 1200000000 HC SEMI PRIVATE

## 2021-09-27 PROCEDURE — 6370000000 HC RX 637 (ALT 250 FOR IP): Performed by: INTERNAL MEDICINE

## 2021-09-27 PROCEDURE — 2500000003 HC RX 250 WO HCPCS: Performed by: INTERNAL MEDICINE

## 2021-09-27 PROCEDURE — 6370000000 HC RX 637 (ALT 250 FOR IP): Performed by: SURGERY

## 2021-09-27 PROCEDURE — 2580000003 HC RX 258: Performed by: INTERNAL MEDICINE

## 2021-09-27 PROCEDURE — 6360000002 HC RX W HCPCS: Performed by: SURGERY

## 2021-09-27 PROCEDURE — 99232 SBSQ HOSP IP/OBS MODERATE 35: CPT | Performed by: INTERNAL MEDICINE

## 2021-09-27 PROCEDURE — 6370000000 HC RX 637 (ALT 250 FOR IP): Performed by: PHYSICIAN ASSISTANT

## 2021-09-27 PROCEDURE — 2580000003 HC RX 258: Performed by: SURGERY

## 2021-09-27 PROCEDURE — 85027 COMPLETE CBC AUTOMATED: CPT

## 2021-09-27 PROCEDURE — 80202 ASSAY OF VANCOMYCIN: CPT

## 2021-09-27 RX ADMIN — DOXYCYCLINE 100 MG: 100 INJECTION, POWDER, LYOPHILIZED, FOR SOLUTION INTRAVENOUS at 13:26

## 2021-09-27 RX ADMIN — ENOXAPARIN SODIUM 40 MG: 40 INJECTION SUBCUTANEOUS at 10:00

## 2021-09-27 RX ADMIN — Medication 10 ML: at 20:04

## 2021-09-27 RX ADMIN — METOPROLOL TARTRATE 75 MG: 50 TABLET, FILM COATED ORAL at 20:04

## 2021-09-27 RX ADMIN — OXYCODONE 10 MG: 5 TABLET ORAL at 15:26

## 2021-09-27 RX ADMIN — Medication 10 ML: at 10:00

## 2021-09-27 RX ADMIN — LISINOPRIL 40 MG: 20 TABLET ORAL at 10:00

## 2021-09-27 RX ADMIN — METOPROLOL TARTRATE 50 MG: 50 TABLET, FILM COATED ORAL at 10:00

## 2021-09-27 RX ADMIN — OXYCODONE 10 MG: 5 TABLET ORAL at 11:18

## 2021-09-27 ASSESSMENT — PAIN SCALES - GENERAL
PAINLEVEL_OUTOF10: 0
PAINLEVEL_OUTOF10: 7
PAINLEVEL_OUTOF10: 0
PAINLEVEL_OUTOF10: 7

## 2021-09-27 NOTE — PROGRESS NOTES
Inpatient wound care note  Discussed wound care orders with Dr Sita Nicholas to have the wound vac placed at home  RX signed per physician

## 2021-09-27 NOTE — PROGRESS NOTES
Department of Internal Medicine  General Internal Medicine  Attending Progress Note  Chief Complaint   Patient presents with    Wound Infection     started as skin tear     SUBJECTIVE:    Reports that he is doing well.  She is vague in her answers  rn suspects white coat anxiety  Patient also c/o anxiety  she is not able to determine if she was anxious during her last elevated bp reading      OBJECTIVE      Medications    Current Facility-Administered Medications: metoprolol tartrate (LOPRESSOR) tablet 75 mg, 75 mg, Oral, BID  lisinopril (PRINIVIL;ZESTRIL) tablet 40 mg, 40 mg, Oral, Daily  oxyCODONE (ROXICODONE) immediate release tablet 5 mg, 5 mg, Oral, Q4H PRN **OR** oxyCODONE (ROXICODONE) immediate release tablet 10 mg, 10 mg, Oral, Q4H PRN  sodium chloride flush 0.9 % injection 5-40 mL, 5-40 mL, IntraVENous, 2 times per day  sodium chloride flush 0.9 % injection 5-40 mL, 5-40 mL, IntraVENous, PRN  0.9 % sodium chloride infusion, 25 mL, IntraVENous, PRN  enoxaparin (LOVENOX) injection 40 mg, 40 mg, SubCUTAneous, Daily  ondansetron (ZOFRAN-ODT) disintegrating tablet 4 mg, 4 mg, Oral, Q8H PRN **OR** ondansetron (ZOFRAN) injection 4 mg, 4 mg, IntraVENous, Q6H PRN  polyethylene glycol (GLYCOLAX) packet 17 g, 17 g, Oral, Daily PRN  acetaminophen (TYLENOL) tablet 650 mg, 650 mg, Oral, Q6H PRN **OR** acetaminophen (TYLENOL) suppository 650 mg, 650 mg, Rectal, Q6H PRN  vancomycin (VANCOCIN) 1,250 mg in dextrose 5 % 250 mL IVPB, 1,250 mg, IntraVENous, Q24H  hydrALAZINE (APRESOLINE) injection 10 mg, 10 mg, IntraVENous, Q8H PRN  Physical    VITALS:  BP (!) 177/87   Pulse 83   Temp 98.5 °F (36.9 °C) (Oral)   Resp 18   Ht 5' 8\" (1.727 m)   Wt 137 lb (62.1 kg)   SpO2 99%   BMI 20.83 kg/m²   CONSTITUTIONAL:  awake, alert, cooperative, no apparent distress, and appears stated age  EYES:  Lids and lashes normal, pupils equal, round and reactive to light, extra ocular muscles intact, sclera clear, conjunctiva normal  ENT: normocepalic, without obvious abnormality  NECK:  supple, symmetrical, trachea midline  LUNGS:  No increased work of breathing, good air exchange, clear to auscultation bilaterally, no crackles or wheezing  CARDIOVASCULAR:  normal apical pulses and normal S1 and S2  ABDOMEN:  normal bowel sounds, soft and no masses palpated  MUSCULOSKELETAL:  there is no redness, warmth, or swelling of the joints  NEUROLOGIC:  Mental Status Exam:  Level of Alertness:   awake  Orientation:   person, place, time  Motor Exam:  Motor exam is symmetrical 5 out of 5 all extremities bilaterally  SKIN:  no bruising or bleeding  Data    CBC:   Lab Results   Component Value Date    WBC 3.8 09/25/2021    RBC 3.87 09/25/2021    HGB 10.6 09/25/2021    HCT 33.1 09/25/2021    MCV 85.5 09/25/2021    MCH 27.4 09/25/2021    MCHC 32.0 09/25/2021    RDW 12.7 09/25/2021     09/25/2021    MPV 9.3 09/25/2021     BMP:    Lab Results   Component Value Date     09/26/2021    K 4.2 09/26/2021    K 3.8 09/23/2021     09/26/2021    CO2 25 09/26/2021    BUN 8 09/26/2021    CREATININE 0.9 09/26/2021    CALCIUM 8.3 09/26/2021    GFRAA >60 09/26/2021    LABGLOM >60 09/26/2021    GLUCOSE 104 09/26/2021       ASSESSMENT AND PLAN      1. Sepsis due to Abdominal wall cellulitis: I& D per surgery  Continue iv abx but change vanco to doxy  wound grew positive for MRSA. 2.  Essential hypertension and  Sinus tachycardia & PVCs: increase bb from 50 to 75 bid  Check metanephrine.      3. Full code  dvt prophy lovenox  Dispo: home with home health on 9/28

## 2021-09-27 NOTE — CARE COORDINATION
9/27/2021 1030 CM note: No covid testing. Wound vac orders noted and follow up visit made to patient. Pt plans to return home upon dc. [facility] accepted pt, received orders and will start service tomorrow Tuesday 9/28/21. Signed [facility] wound vac Rx faxed to [facility] await insurance approval/check copay cost. CM to follow for possible abx needs.  Shun ARAIZA

## 2021-09-27 NOTE — PROGRESS NOTES
GENERAL SURGERY  DAILY PROGRESS NOTE  9/27/2021    Chief Complaint   Patient presents with    Wound Infection     started as skin tear       Subjective:  Doing well this morning pain. No pain, tolerating diet. Objective:  BP (!) 182/86   Pulse 80   Temp 99 °F (37.2 °C) (Oral)   Resp 20   Ht 5' 8\" (1.727 m)   Wt 137 lb (62.1 kg)   SpO2 99%   BMI 20.83 kg/m²     GENERAL:  Laying in bed, awake, alert, cooperative, no apparent distress  HEAD: Normocephalic, atraumatic  EYES: No sclera icterus, pupils equal  LUNGS:  No increased work of breathing  CARDIOVASCULAR:  RR  ABDOMEN:  Soft, non-tender, non-distended  EXTREMITIES: No edema or swelling  SKIN: Upper abdomen elliptical incision through subcutaneous tissue with clean wound edges no signs of necrotic material, some erythema but no drainage     Assessment/Plan:  73 y.o. female with necrotizing soft tissue infection of the upper abdomen s/p I&D    Recommend wound vac placement today  antibiotics per primary  No further surgical intervention required, please call if needed    Electronically signed by Rico Freitas MD on 9/27/2021 at 7:48 AM    As above.

## 2021-09-27 NOTE — FLOWSHEET NOTE
Inpatient Wound Care    Admit Date: 9/22/2021 11:43 AM    Reason for consult:  abd wound    Significant history:    Past Medical History:   Diagnosis Date    Essential hypertension        Wound history:      Findings:       09/27/21 1603   Wound 09/22/21 Abdomen Mid;Upper   Date First Assessed/Time First Assessed: 09/22/21 1810   Present on Hospital Admission: Yes  Primary Wound Type: (c) Other (comment)  Location: Abdomen  Wound Location Orientation: Mid;Upper   Dressing/Treatment Moist to moist   Wound Assessment Pink/red   Drainage Amount Moderate   Drainage Description Serosanguinous   Odor None   Margot-wound Assessment Intact       Impression:  Surgical wound    Interventions in place:  Moist dressing    Plan:  Plan for homecare  Plan for wound vac  Follow up with surgery      Ariel Robles RN 9/27/2021 4:05 PM

## 2021-09-27 NOTE — CARE COORDINATION
9/27/2021 1531 CM note:  No covid testing. Per Lane Santos, [employer], wound vac approved and to be delivered to pt's home today. [facility] accepted pt, received orders. Pt plans to return home upon dc with [facility].  Shnu ARAIZA

## 2021-09-28 LAB
ANION GAP SERPL CALCULATED.3IONS-SCNC: 11 MMOL/L (ref 7–16)
BUN BLDV-MCNC: 10 MG/DL (ref 6–23)
CALCIUM SERPL-MCNC: 8.6 MG/DL (ref 8.6–10.2)
CHLORIDE BLD-SCNC: 101 MMOL/L (ref 98–107)
CO2: 24 MMOL/L (ref 22–29)
CREAT SERPL-MCNC: 0.9 MG/DL (ref 0.5–1)
GFR AFRICAN AMERICAN: >60
GFR NON-AFRICAN AMERICAN: >60 ML/MIN/1.73
GLUCOSE BLD-MCNC: 101 MG/DL (ref 74–99)
POTASSIUM SERPL-SCNC: 3.9 MMOL/L (ref 3.5–5)
SODIUM BLD-SCNC: 136 MMOL/L (ref 132–146)

## 2021-09-28 PROCEDURE — 2580000003 HC RX 258: Performed by: SURGERY

## 2021-09-28 PROCEDURE — 6370000000 HC RX 637 (ALT 250 FOR IP): Performed by: INTERNAL MEDICINE

## 2021-09-28 PROCEDURE — 2500000003 HC RX 250 WO HCPCS: Performed by: INTERNAL MEDICINE

## 2021-09-28 PROCEDURE — 6370000000 HC RX 637 (ALT 250 FOR IP): Performed by: SURGERY

## 2021-09-28 PROCEDURE — 6360000002 HC RX W HCPCS: Performed by: SURGERY

## 2021-09-28 PROCEDURE — 1200000000 HC SEMI PRIVATE

## 2021-09-28 PROCEDURE — 80048 BASIC METABOLIC PNL TOTAL CA: CPT

## 2021-09-28 PROCEDURE — 99232 SBSQ HOSP IP/OBS MODERATE 35: CPT | Performed by: INTERNAL MEDICINE

## 2021-09-28 PROCEDURE — 36415 COLL VENOUS BLD VENIPUNCTURE: CPT

## 2021-09-28 PROCEDURE — 2580000003 HC RX 258: Performed by: INTERNAL MEDICINE

## 2021-09-28 RX ORDER — HYDROCHLOROTHIAZIDE 12.5 MG/1
12.5 TABLET ORAL DAILY
Status: DISCONTINUED | OUTPATIENT
Start: 2021-09-28 | End: 2021-09-29 | Stop reason: HOSPADM

## 2021-09-28 RX ADMIN — LISINOPRIL 40 MG: 20 TABLET ORAL at 08:54

## 2021-09-28 RX ADMIN — DOXYCYCLINE 100 MG: 100 INJECTION, POWDER, LYOPHILIZED, FOR SOLUTION INTRAVENOUS at 01:09

## 2021-09-28 RX ADMIN — OXYCODONE 10 MG: 5 TABLET ORAL at 13:57

## 2021-09-28 RX ADMIN — Medication 10 ML: at 20:46

## 2021-09-28 RX ADMIN — HYDROCHLOROTHIAZIDE 12.5 MG: 12.5 TABLET ORAL at 15:04

## 2021-09-28 RX ADMIN — METOPROLOL TARTRATE 75 MG: 50 TABLET, FILM COATED ORAL at 20:44

## 2021-09-28 RX ADMIN — Medication 10 ML: at 08:55

## 2021-09-28 RX ADMIN — METOPROLOL TARTRATE 75 MG: 50 TABLET, FILM COATED ORAL at 08:54

## 2021-09-28 RX ADMIN — DOXYCYCLINE 100 MG: 100 INJECTION, POWDER, LYOPHILIZED, FOR SOLUTION INTRAVENOUS at 13:54

## 2021-09-28 RX ADMIN — ENOXAPARIN SODIUM 40 MG: 40 INJECTION SUBCUTANEOUS at 08:54

## 2021-09-28 ASSESSMENT — PAIN SCALES - GENERAL
PAINLEVEL_OUTOF10: 0
PAINLEVEL_OUTOF10: 0
PAINLEVEL_OUTOF10: 9
PAINLEVEL_OUTOF10: 10

## 2021-09-28 NOTE — CARE COORDINATION
9/28/2021 1340 CM note: No covid testing. Per Concepcion [employer], wound vac approved and delivered to pt's home. [facility] accepted pt, received orders. Pt plans to return home upon dc with [facility]. IF pt discharged today, [facility] will start services 9/29/21.  Shun ARAIZA

## 2021-09-28 NOTE — DISCHARGE SUMMARY
[facility] Physician Discharge Summary       Reyes Leopard, [address] [place] [id number]    On 11/12/2021  Follow up appt at [facility] is Friday Nov 12th @ 9:30 AM with Dr. Mirtha James. Activity level: Slowly increase as tolerated    Diet: ADULT DIET; Regular    Labs: None are pending at the discharge    Condition at discharge: Stable    Dispo: Return to home setting     Patient ID:  Adeline Mosqueda  [id number]  73 y.o.  1950    Admit date: 9/22/2021    Discharge date and time:  9/29/2021  11:53 AM    Admission Diagnoses: Principal Problem:    Sepsis due to cellulitis [facility])  Active Problems:    Abdominal wall cellulitis    Sinus tachycardia    PVC's (premature ventricular contractions)    Essential hypertension  Resolved Problems:    * No resolved hospital problems. *      Discharge Diagnoses: Principal Problem:    Sepsis due to cellulitis [facility])  Active Problems:    Abdominal wall cellulitis    Sinus tachycardia    PVC's (premature ventricular contractions)    Essential hypertension  Resolved Problems:    * No resolved hospital problems. *      Consults:  IP CONSULT TO GENERAL SURGERY  IP CONSULT TO CARDIOLOGY  IP CONSULT TO SOCIAL WORK    Procedures: None significant except if described in hospital course. Hospital Course: History of present illness from History and Physical:  The patient is a 73 y.o. female with no significant past medical history who presents with with redness and tenderness at her epigastric region. Onset was about 1 week ago. She denied fever and chills.  She noted that it started with lisa from her Bra. She denied nausea or vomiting. In the ER, she was noted to have tachycardia, elevated WBC. There was concern for sepsis and she was admitted for abx treatment.        1. Sepsis due to Abdominal wall cellulitis: I& D per surgery  iv  vanco changed to iv doxy then po doxy for home  wound grew positive for MRSA.     2.  Essential hypertension and  Sinus tachycardia & PVCs: increase bb from 50 to 75 bid on 9/27. On 9/28 report of spiked bp again (initially reported as sBP of 191) but then repeating and written report shws improvement so safe for d/c on 75 bid with hctz  Check metanephrine was previously noted but not ordered. She is still having her second antihypertensive titrated so will not order quite yet but keep in mind  also had hydrochlorothiazide 12.5. She will monitor this at home with new regimen and bring in bp readings to pcp       Dispo: home with home health on 9/28      Discharge Exam:  Vitals:    09/28/21 1635 09/28/21 2044 09/28/21 2100 09/29/21 0700   BP: (!) 176/89 (!) 171/93 (!) 160/70 (!) 165/79   Pulse: 84 84 82 80   Resp: 18   18   Temp: 97.5 °F (36.4 °C)   98.9 °F (37.2 °C)   TempSrc: Infrared   Infrared   SpO2: 99%   97%   Weight:       Height:           No acute distress moist membranes   Normocephalic, without obvious abnormality, atraumatic, external ears without lesions,   Neck supple no cervical lymphadenopathy  Heart has a regular rate and rhythm no murmur  Lungs are clear to auscultation bilaterally with equal movements. Abdomen is soft nontender nondistended no rebound or guarding. No  significant peripheral edema good peripheral perfusion. No significant rashes or new skin lesions. No new focality on neuro exam which is overall grossly intact. Affect and mood seem to be appropriate   Wound appearance has appeared stable and improved    I/O last 3 completed shifts:   In: 840 [P.O.:840]  Out: 300 [Urine:300]  I/O this shift:  In: 300 [P.O.:300]  Out: 300 [Urine:300]      LABS:  Recent Labs     09/28/21  0523 09/29/21  0555    136   K 3.9 4.1    101   CO2 24 28   BUN 10 11   CREATININE 0.9 1.0   GLUCOSE 101* 108*   CALCIUM 8.6 8.9       Recent Labs     09/27/21  1420 09/29/21  0555   WBC 6.1 5.1   RBC 4.18 4.19   HGB 11.5 11.5   HCT 34.4 35.7   MCV 82.3 85.2   MCH 27.5 27.4   MCHC 33.4 32.2   RDW 12.5 12.8    300   MPV 9.2 8.9       No results for input(s): POCGLU in the last 72 hours. Imaging:  CT ABDOMEN PELVIS W IV CONTRAST Additional Contrast? None    Result Date: 9/22/2021  EXAMINATION: CT OF THE ABDOMEN AND PELVIS WITH CONTRAST 9/22/2021 12:44 pm TECHNIQUE: CT of the abdomen and pelvis was performed with the administration of intravenous contrast. Multiplanar reformatted images are provided for review. Dose modulation, iterative reconstruction, and/or weight based adjustment of the mA/kV was utilized to reduce the radiation dose to as low as reasonably achievable. COMPARISON: None. HISTORY: ORDERING SYSTEM PROVIDED HISTORY: epigastric area infection, swelling, induration, erythema TECHNOLOGIST PROVIDED HISTORY: Reason for exam:->epigastric area infection, swelling, induration, erythema Additional Contrast?->None Decision Support Exception - unselect if not a suspected or confirmed emergency medical condition->Emergency Medical Condition (MA) FINDINGS: Lower Chest: There are no pleural effusions or focal areas of consolidation. Organs: Liver and spleen are enhancing normally. Several low-attenuation lesions in the liver likely cysts. Kidneys are enhancing normally. Adrenal glands within normal limits. GI/Bowel: There are no dilated loops of bowel. However there is air distended and fluid distended small bowel in the right abdomen. Small bowel measures a diameter 2.6 cm fluid. However there is no transition. Pelvis: There is multi septated complex ovarian cystic mass left hemipelvis measuring 4.2 x 4.5 cm.   There is IUD noted in place. Peritoneum/Retroperitoneum: There is multiple lymph nodes seen at the celiac axis extending to gastrohepatic ligament measuring 9 mm short axis. Several in the left periaortic region noted with the largest measuring 1.2 cm short axis. A few are noted along the iliac chains. Posterior to the left external iliac lymph node is 9 mm short axis. Anterior left pelvic side wall lymph node is 1.1 cm short axis. Several noted at the external iliac to groin under 1 cm. Bones/Soft Tissues: There is levoscoliosis lumbar spine with marked associated degenerative change along the right lateral aspect lumbosacral junction. There is a poorly defined irregular fluid attenuation in stranding with some skin thickening anterior subcutaneous tissue measuring 3.9 x 8.4 x 5.2 cm in AP by transverse by craniocaudal dimensions. This is noted approximately 6 cm superior to the umbilicus. Poorly defined irregular fluid attenuation with skin thickening in the subcutaneous tissue with associated soft tissue swelling in the upper mid abdomen could represent cellulitis given patient's history. Drainable collection is not definitively seen. At least clinical follow-up to resolution recommended. There is adenopathy seen in the mesentery along the celiac axis scattered in the retroperitoneum into the pelvis. These lymph nodes predominantly are just under criteria to be considered adenopathy but a few do meet criteria to be considered adenopathy. These lymph nodes are nonspecific and could be reactive or malignant. At least clinical correlation and a follow-up CT recommended in 3 months. Complex septated cystic mass left hemipelvis felt to be associated with the left ovary. Further evaluation with non emergent ultrasound recommended. Levoscoliosis lumbar spine with marked associated degenerative change along the right lateral aspect lumbar spine.  RECOMMENDATIONS: 4.5 cm indeterminate ovarian cyst.  Recommend prompt follow-up with pelvic US. Reference: Jahaira Davila Radiol 0059;[id number]         Patient Instructions: There are no discharge medications for this patient. Note that more than 30 minutes was spent in preparing discharge papers, discussing discharge with patient, medication review, etc.    NOTE: This report was transcribed using voice recognition software. Every effort was made to ensure accuracy; however, inadvertent computerized transcription errors may be present.      Signed:  Electronically signed by Matt Cramer MD on 9/29/2021 at 11:53 AM

## 2021-09-28 NOTE — PROGRESS NOTES
Department of Internal Medicine  General Internal Medicine  Attending Progress Note  Chief Complaint   Patient presents with    Wound Infection     started as skin tear     SUBJECTIVE:    Reports that he is doing well.        OBJECTIVE      Medications    Current Facility-Administered Medications: metoprolol tartrate (LOPRESSOR) tablet 75 mg, 75 mg, Oral, BID  doxycycline (VIBRAMYCIN) 100 mg in dextrose 5 % 100 mL IVPB, 100 mg, IntraVENous, Q12H  lisinopril (PRINIVIL;ZESTRIL) tablet 40 mg, 40 mg, Oral, Daily  oxyCODONE (ROXICODONE) immediate release tablet 5 mg, 5 mg, Oral, Q4H PRN **OR** oxyCODONE (ROXICODONE) immediate release tablet 10 mg, 10 mg, Oral, Q4H PRN  sodium chloride flush 0.9 % injection 5-40 mL, 5-40 mL, IntraVENous, 2 times per day  sodium chloride flush 0.9 % injection 5-40 mL, 5-40 mL, IntraVENous, PRN  0.9 % sodium chloride infusion, 25 mL, IntraVENous, PRN  enoxaparin (LOVENOX) injection 40 mg, 40 mg, SubCUTAneous, Daily  ondansetron (ZOFRAN-ODT) disintegrating tablet 4 mg, 4 mg, Oral, Q8H PRN **OR** ondansetron (ZOFRAN) injection 4 mg, 4 mg, IntraVENous, Q6H PRN  polyethylene glycol (GLYCOLAX) packet 17 g, 17 g, Oral, Daily PRN  acetaminophen (TYLENOL) tablet 650 mg, 650 mg, Oral, Q6H PRN **OR** acetaminophen (TYLENOL) suppository 650 mg, 650 mg, Rectal, Q6H PRN  hydrALAZINE (APRESOLINE) injection 10 mg, 10 mg, IntraVENous, Q8H PRN  Physical    VITALS:  BP (!) 173/80   Pulse 67   Temp 97.9 °F (36.6 °C) (Oral)   Resp 18   Ht 5' 8\" (1.727 m)   Wt 137 lb (62.1 kg)   SpO2 99%   BMI 20.83 kg/m²   CONSTITUTIONAL:  awake, alert, cooperative, no apparent distress, and appears stated age  EYES:  Lids and lashes normal, pupils equal, round and reactive to light, extra ocular muscles intact, sclera clear, conjunctiva normal  ENT:  normocepalic, without obvious abnormality  NECK:  supple, symmetrical, trachea midline  LUNGS:  No increased work of breathing, good air exchange, clear to auscultation bilaterally, no crackles or wheezing  CARDIOVASCULAR:  normal apical pulses and normal S1 and S2  ABDOMEN:  normal bowel sounds, soft and no masses palpated  MUSCULOSKELETAL:  there is no redness, warmth, or swelling of the joints  NEUROLOGIC:  Mental Status Exam:  Level of Alertness:   awake  Orientation:   person, place, time  Motor Exam:  Motor exam is symmetrical 5 out of 5 all extremities bilaterally  SKIN:  no bruising or bleeding  Data    CBC:   Lab Results   Component Value Date    WBC 6.1 09/27/2021    RBC 4.18 09/27/2021    HGB 11.5 09/27/2021    HCT 34.4 09/27/2021    MCV 82.3 09/27/2021    MCH 27.5 09/27/2021    MCHC 33.4 09/27/2021    RDW 12.5 09/27/2021     09/27/2021    MPV 9.2 09/27/2021     BMP:    Lab Results   Component Value Date     09/28/2021    K 3.9 09/28/2021    K 3.8 09/23/2021     09/28/2021    CO2 24 09/28/2021    BUN 10 09/28/2021    CREATININE 0.9 09/28/2021    CALCIUM 8.6 09/28/2021    GFRAA >60 09/28/2021    LABGLOM >60 09/28/2021    GLUCOSE 101 09/28/2021       ASSESSMENT AND PLAN      1. Sepsis due to Abdominal wall cellulitis: I& D per surgery  Continue iv abx but change vanco to doxy  wound grew positive for MRSA. 2.  Essential hypertension and  Sinus tachycardia & PVCs: increase bb from 50 to 75 bid on 9/27. On 9/28 spiked bp again (initially reported as sBP of 191) so increased to 100 bid  Check metanephrine was previously noted but not ordered. She is still having her second antihypertensive titrated so will not order quite yet but keep in mind  also had hydrochlorothiazide 12.5    3.  Full code  dvt prophy lovenox  Dispo: home with home health on 9/28

## 2021-09-29 VITALS
WEIGHT: 137 LBS | TEMPERATURE: 98.9 F | DIASTOLIC BLOOD PRESSURE: 79 MMHG | HEART RATE: 80 BPM | RESPIRATION RATE: 18 BRPM | HEIGHT: 68 IN | SYSTOLIC BLOOD PRESSURE: 165 MMHG | OXYGEN SATURATION: 97 % | BODY MASS INDEX: 20.76 KG/M2

## 2021-09-29 LAB
ANION GAP SERPL CALCULATED.3IONS-SCNC: 7 MMOL/L (ref 7–16)
BUN BLDV-MCNC: 11 MG/DL (ref 6–23)
CALCIUM SERPL-MCNC: 8.9 MG/DL (ref 8.6–10.2)
CHLORIDE BLD-SCNC: 101 MMOL/L (ref 98–107)
CO2: 28 MMOL/L (ref 22–29)
CREAT SERPL-MCNC: 1 MG/DL (ref 0.5–1)
GFR AFRICAN AMERICAN: >60
GFR NON-AFRICAN AMERICAN: 55 ML/MIN/1.73
GLUCOSE BLD-MCNC: 108 MG/DL (ref 74–99)
HCT VFR BLD CALC: 35.7 % (ref 34–48)
HEMOGLOBIN: 11.5 G/DL (ref 11.5–15.5)
MCH RBC QN AUTO: 27.4 PG (ref 26–35)
MCHC RBC AUTO-ENTMCNC: 32.2 % (ref 32–34.5)
MCV RBC AUTO: 85.2 FL (ref 80–99.9)
PDW BLD-RTO: 12.8 FL (ref 11.5–15)
PLATELET # BLD: 300 E9/L (ref 130–450)
PMV BLD AUTO: 8.9 FL (ref 7–12)
POTASSIUM SERPL-SCNC: 4.1 MMOL/L (ref 3.5–5)
RBC # BLD: 4.19 E12/L (ref 3.5–5.5)
SODIUM BLD-SCNC: 136 MMOL/L (ref 132–146)
WBC # BLD: 5.1 E9/L (ref 4.5–11.5)

## 2021-09-29 PROCEDURE — 6360000002 HC RX W HCPCS: Performed by: SURGERY

## 2021-09-29 PROCEDURE — 6370000000 HC RX 637 (ALT 250 FOR IP): Performed by: INTERNAL MEDICINE

## 2021-09-29 PROCEDURE — 80048 BASIC METABOLIC PNL TOTAL CA: CPT

## 2021-09-29 PROCEDURE — 2500000003 HC RX 250 WO HCPCS: Performed by: INTERNAL MEDICINE

## 2021-09-29 PROCEDURE — 36415 COLL VENOUS BLD VENIPUNCTURE: CPT

## 2021-09-29 PROCEDURE — 2580000003 HC RX 258: Performed by: INTERNAL MEDICINE

## 2021-09-29 PROCEDURE — 6370000000 HC RX 637 (ALT 250 FOR IP): Performed by: SURGERY

## 2021-09-29 PROCEDURE — 85027 COMPLETE CBC AUTOMATED: CPT

## 2021-09-29 RX ORDER — LISINOPRIL 40 MG/1
40 TABLET ORAL DAILY
Qty: 30 TABLET | Refills: 3 | Status: SHIPPED | OUTPATIENT
Start: 2021-09-29 | End: 2021-11-12 | Stop reason: SDUPTHER

## 2021-09-29 RX ORDER — HYDROCHLOROTHIAZIDE 12.5 MG/1
12.5 TABLET ORAL DAILY
Qty: 30 TABLET | Refills: 3 | Status: SHIPPED | OUTPATIENT
Start: 2021-09-30 | End: 2021-11-12 | Stop reason: SDUPTHER

## 2021-09-29 RX ORDER — DOXYCYCLINE HYCLATE 100 MG
100 TABLET ORAL 2 TIMES DAILY
Qty: 14 TABLET | Refills: 0 | Status: SHIPPED | OUTPATIENT
Start: 2021-09-29 | End: 2021-10-06

## 2021-09-29 RX ORDER — METOPROLOL TARTRATE 75 MG/1
75 TABLET, FILM COATED ORAL 2 TIMES DAILY
Qty: 60 TABLET | Refills: 3 | Status: SHIPPED | OUTPATIENT
Start: 2021-09-29 | End: 2021-11-12 | Stop reason: SDUPTHER

## 2021-09-29 RX ADMIN — METOPROLOL TARTRATE 75 MG: 50 TABLET, FILM COATED ORAL at 08:50

## 2021-09-29 RX ADMIN — HYDROCHLOROTHIAZIDE 12.5 MG: 12.5 TABLET ORAL at 08:50

## 2021-09-29 RX ADMIN — LISINOPRIL 40 MG: 20 TABLET ORAL at 08:50

## 2021-09-29 RX ADMIN — OXYCODONE 10 MG: 5 TABLET ORAL at 11:06

## 2021-09-29 RX ADMIN — DOXYCYCLINE 100 MG: 100 INJECTION, POWDER, LYOPHILIZED, FOR SOLUTION INTRAVENOUS at 01:34

## 2021-09-29 RX ADMIN — ACETAMINOPHEN 650 MG: 325 TABLET ORAL at 08:50

## 2021-09-29 ASSESSMENT — PAIN SCALES - GENERAL
PAINLEVEL_OUTOF10: 0
PAINLEVEL_OUTOF10: 8
PAINLEVEL_OUTOF10: 0

## 2021-09-29 NOTE — CARE COORDINATION
9/29/2021 1031 CM note: No covid testing. Wound vac approved and delivered to pt's home. [facility] accepted pt, received orders. Pt plans to return home upon dc with [facility] and [facility] can start services 9/30/21. Pt informed of above and voiced understanding.  Shun ARAIZA

## 2021-09-29 NOTE — PLAN OF CARE
Problem: Pain:  Goal: Pain level will decrease  Description: Pain level will decrease  9/28/2021 2232 by Daniel Ventura RN  Outcome: Met This Shift     Problem: Pain:  Goal: Control of acute pain  Description: Control of acute pain  9/28/2021 2232 by Daniel Ventura RN  Outcome: Met This Shift

## 2021-09-30 LAB
CATECHOLAMINE INTERPRETATION, PLASMA: NORMAL
DOPAMINE PLASMA: <20 PG/ML (ref 0–20)
EPINEPHRINE PLASMA: 30 PG/ML (ref 10–200)
NOREPINEPHRINE: 144 PG/ML (ref 80–520)

## 2021-10-01 LAB
METANEPH/PLASMA INTERP: NORMAL
METANEPHRINE FREE PLASMA: 0.12 NMOL/L (ref 0–0.49)
NORMETANEPHRINE FREE PLASMA: 0.34 NMOL/L (ref 0–0.89)

## 2021-10-04 ENCOUNTER — TELEPHONE (OUTPATIENT)
Dept: SURGERY | Age: 71
End: 2021-10-04

## 2021-10-04 DIAGNOSIS — L03.311 ABDOMINAL WALL CELLULITIS: Primary | ICD-10-CM

## 2021-10-04 RX ORDER — HYDROCODONE BITARTRATE AND ACETAMINOPHEN 5; 325 MG/1; MG/1
1 TABLET ORAL EVERY 6 HOURS PRN
Qty: 10 TABLET | Refills: 0 | Status: SHIPPED | OUTPATIENT
Start: 2021-10-04 | End: 2021-10-07

## 2021-10-04 NOTE — TELEPHONE ENCOUNTER
Patient having increased pain during wound vac application. Call placed to Dr. Angie Yanez to discuss pain medication to use prior to wound vac application. Prescription pended for Dr. Simran Candelaria.   Electronically signed by Sheila Butler on 10/4/21 at 3:31 PM EDT

## 2021-10-06 ENCOUNTER — OFFICE VISIT (OUTPATIENT)
Dept: SURGERY | Age: 71
End: 2021-10-06

## 2021-10-06 VITALS
HEART RATE: 69 BPM | SYSTOLIC BLOOD PRESSURE: 138 MMHG | DIASTOLIC BLOOD PRESSURE: 69 MMHG | BODY MASS INDEX: 19.25 KG/M2 | WEIGHT: 127 LBS | HEIGHT: 68 IN | TEMPERATURE: 98 F

## 2021-10-06 DIAGNOSIS — Z09 POSTOPERATIVE EXAMINATION: ICD-10-CM

## 2021-10-06 DIAGNOSIS — S31.109D OPEN WOUND OF ABDOMINAL WALL, SUBSEQUENT ENCOUNTER: Primary | ICD-10-CM

## 2021-10-06 PROCEDURE — 99024 POSTOP FOLLOW-UP VISIT: CPT | Performed by: SURGERY

## 2021-10-06 NOTE — PROGRESS NOTES
General Surgery Office Note  [facility] Surgery  Consandre P. Aparna Aly MD    Patient's Name/Date of Birth: Godfrey Carrel / 1950    Date: October 6, 2021     Surgeon: Aparna Aly MD    Chief Complaint:   Chief Complaint   Patient presents with    Post-Op Check     excisional debridement abd wall wound       Patient Active Problem List   Diagnosis    Sepsis due to cellulitis [facility])    Abdominal wall cellulitis    Sinus tachycardia    PVC's (premature ventricular contractions)    Essential hypertension       Subjective: Doing well. Tolerating wound VAC without difficulty. Wound has improved greatly. Objective:  /69   Pulse 69   Temp 98 °F (36.7 °C) (Temporal)   Ht 5' 8\" (1.727 m)   Wt 127 lb (57.6 kg)   BMI 19.31 kg/m²   Labs:  No results for input(s): WBC, HGB, HCT in the last 72 hours. Invalid input(s): PLR  Lab Results   Component Value Date    CREATININE 1.0 09/29/2021    BUN 11 09/29/2021     09/29/2021    K 4.1 09/29/2021     09/29/2021    CO2 28 09/29/2021     No results for input(s): LIPASE, AMYLASE in the last 72 hours. General appearance: AA, NAD  HEENT: NCAT, PERRLA, EOMI  Lungs: Clear, equal rise bilateral  Heart: Reg  Abdomen: soft, nondistended, nontender, wound VAC removed. 5 x 1 cm wound with clean base. No surrounding erythema, no purulence. Skin: No lesions, incisions well healed  Psych: No distress, conversive, no hallucinations  : No ulcers or lesions  Rectal: No bleeding    A complete 10 system review was performed and are otherwise negative unless mentioned in the above HPI. Specific negatives are listed below but may not include all those reviewed.     General ROS: negative obtundation, AMS  ENT ROS: negative rhinorrhea, epistaxis  Allergy and Immunology ROS: negative itchy/watery eyes or nasal congestion  Hematological and Lymphatic ROS: negative spontaneous bleeding or bruising  Endocrine ROS: negative  lethargy, mood swings, palpitations or polydipsia/polyuria  Respiratory ROS: negative sputum changes, stridor, tachypnea or wheezing  Cardiovascular ROS: negative for - loss of consciousness, murmur or orthopnea  Gastrointestinal ROS: negative for - hematochezia or hematemesis  Genito-Urinary ROS: negative for -  genital discharge or hematuria  Musculoskeletal ROS: negative for - focal weakness, gangrene  Psych/Neuro ROS: negative for - visual or auditory hallucinations, suicidal ideation      Time spent reviewing past medical, surgical, social and family history, vitals, nursing assessment and images.     Imaging:  n/a    Pathology: n/a    Assessment/Plan:  Aleyda Romero is a 73 y.o. female status post excisional debridement of infected abdominal wall wound and I&D of abscess 2 weeks ago    Continue wound VAC  Refer to wound care center for continued wound management  Follow-up with me as needed    Physician Signature: Electronically signed by Dr. Edmundo Pope  10/6/2021  1:31 PM

## 2021-10-12 ENCOUNTER — HOSPITAL ENCOUNTER (OUTPATIENT)
Dept: WOUND CARE | Age: 71
Discharge: HOME | End: 2021-10-12
Payer: MEDICARE

## 2021-10-12 VITALS
WEIGHT: 127 LBS | SYSTOLIC BLOOD PRESSURE: 138 MMHG | HEART RATE: 68 BPM | HEIGHT: 68 IN | RESPIRATION RATE: 16 BRPM | BODY MASS INDEX: 19.25 KG/M2 | DIASTOLIC BLOOD PRESSURE: 70 MMHG | TEMPERATURE: 97.6 F

## 2021-10-12 DIAGNOSIS — A41.9 SEPSIS DUE TO CELLULITIS (HCC): ICD-10-CM

## 2021-10-12 DIAGNOSIS — L03.90 SEPSIS DUE TO CELLULITIS (HCC): ICD-10-CM

## 2021-10-12 DIAGNOSIS — L98.493 ULCER OF ABDOMEN WALL WITH NECROSIS OF MUSCLE (HCC): Primary | ICD-10-CM

## 2021-10-12 PROCEDURE — 11042 DBRDMT SUBQ TIS 1ST 20SQCM/<: CPT

## 2021-10-12 PROCEDURE — 11042 DBRDMT SUBQ TIS 1ST 20SQCM/<: CPT | Performed by: FAMILY MEDICINE

## 2021-10-12 PROCEDURE — 99203 OFFICE O/P NEW LOW 30 MIN: CPT | Performed by: FAMILY MEDICINE

## 2021-10-12 PROCEDURE — 6370000000 HC RX 637 (ALT 250 FOR IP): Performed by: FAMILY MEDICINE

## 2021-10-12 RX ORDER — GINSENG 100 MG
CAPSULE ORAL ONCE
Status: CANCELLED | OUTPATIENT
Start: 2021-10-12 | End: 2021-10-12

## 2021-10-12 RX ORDER — LIDOCAINE HYDROCHLORIDE 20 MG/ML
JELLY TOPICAL ONCE
Status: CANCELLED | OUTPATIENT
Start: 2021-10-12 | End: 2021-10-12

## 2021-10-12 RX ORDER — HYDROCODONE BITARTRATE AND ACETAMINOPHEN 5; 325 MG/1; MG/1
1 TABLET ORAL EVERY 6 HOURS PRN
COMMUNITY
End: 2021-11-12 | Stop reason: ALTCHOICE

## 2021-10-12 RX ORDER — ACETAMINOPHEN 500 MG
500 TABLET ORAL EVERY 6 HOURS PRN
COMMUNITY
End: 2021-11-12 | Stop reason: ALTCHOICE

## 2021-10-12 RX ORDER — LIDOCAINE 50 MG/G
OINTMENT TOPICAL ONCE
Status: CANCELLED | OUTPATIENT
Start: 2021-10-12 | End: 2021-10-12

## 2021-10-12 RX ORDER — BACITRACIN ZINC AND POLYMYXIN B SULFATE 500; 1000 [USP'U]/G; [USP'U]/G
OINTMENT TOPICAL ONCE
Status: CANCELLED | OUTPATIENT
Start: 2021-10-12 | End: 2021-10-12

## 2021-10-12 RX ORDER — BACITRACIN, NEOMYCIN, POLYMYXIN B 400; 3.5; 5 [USP'U]/G; MG/G; [USP'U]/G
OINTMENT TOPICAL ONCE
Status: CANCELLED | OUTPATIENT
Start: 2021-10-12 | End: 2021-10-12

## 2021-10-12 RX ORDER — LIDOCAINE HYDROCHLORIDE 40 MG/ML
SOLUTION TOPICAL ONCE
Status: COMPLETED | OUTPATIENT
Start: 2021-10-12 | End: 2021-10-12

## 2021-10-12 RX ORDER — LIDOCAINE 40 MG/G
CREAM TOPICAL ONCE
Status: CANCELLED | OUTPATIENT
Start: 2021-10-12 | End: 2021-10-12

## 2021-10-12 RX ORDER — LIDOCAINE HYDROCHLORIDE 40 MG/ML
SOLUTION TOPICAL ONCE
Status: CANCELLED | OUTPATIENT
Start: 2021-10-12 | End: 2021-10-12

## 2021-10-12 RX ORDER — CLOBETASOL PROPIONATE 0.5 MG/G
OINTMENT TOPICAL ONCE
Status: CANCELLED | OUTPATIENT
Start: 2021-10-12 | End: 2021-10-12

## 2021-10-12 RX ORDER — GENTAMICIN SULFATE 1 MG/G
OINTMENT TOPICAL ONCE
Status: CANCELLED | OUTPATIENT
Start: 2021-10-12 | End: 2021-10-12

## 2021-10-12 RX ORDER — BETAMETHASONE DIPROPIONATE 0.05 %
OINTMENT (GRAM) TOPICAL ONCE
Status: CANCELLED | OUTPATIENT
Start: 2021-10-12 | End: 2021-10-12

## 2021-10-12 RX ADMIN — LIDOCAINE HYDROCHLORIDE 10 ML: 40 SOLUTION TOPICAL at 16:22

## 2021-10-12 RX ADMIN — LIDOCAINE HYDROCHLORIDE 5 ML: 40 SOLUTION TOPICAL at 14:18

## 2021-10-12 ASSESSMENT — PAIN SCALES - GENERAL: PAINLEVEL_OUTOF10: 0

## 2021-10-12 ASSESSMENT — ENCOUNTER SYMPTOMS
ABDOMINAL PAIN: 0
CONSTIPATION: 0
WHEEZING: 0
VOMITING: 0
NAUSEA: 0
BLOOD IN STOOL: 0
DIARRHEA: 0
COUGH: 0
SHORTNESS OF BREATH: 0

## 2021-10-12 NOTE — PROGRESS NOTES
[facility] and Physical      CHIEF COMPLAINT:    Chief Complaint   Patient presents with    Wound Check          The Story of the Wound [de-identified] Abscess and necrosis I/D in OR and VAC placed. The patient is a 73 y.o. female patient of Elizabeth Gallardo MD  who presents with  a ulceration due to abd wall abscess ORSA wound which is located on the abdomen Current symptoms include erythema: mild. Pain is rated 3/10. Past Medical History:    Past Medical History:   Diagnosis Date    Essential hypertension        Past Surgical History:    Past Surgical History:   Procedure Laterality Date    ABDOMEN SURGERY N/A 9/23/2021    INCISION AND DRAINAGE ABDOMINAL WALL ABSCESS performed by Michele Kim MD at [address]:    Patient has no known allergies.     Labs:   CBC with Differential:    Lab Results   Component Value Date    WBC 5.1 09/29/2021    RBC 4.19 09/29/2021    HGB 11.5 09/29/2021    HCT 35.7 09/29/2021     09/29/2021    MCV 85.2 09/29/2021    MCH 27.4 09/29/2021    MCHC 32.2 09/29/2021    RDW 12.8 09/29/2021    METASPCT 0.4 09/25/2021    LYMPHOPCT 8.3 09/25/2021    MONOPCT 4.8 09/25/2021    MYELOPCT 2.2 09/25/2021    BASOPCT 1.3 09/25/2021    MONOSABS 0.19 09/25/2021    LYMPHSABS 0.30 09/25/2021    EOSABS 0.02 09/25/2021    BASOSABS 0.05 09/25/2021     CMP:    Lab Results   Component Value Date     09/29/2021    K 4.1 09/29/2021    K 3.8 09/23/2021     09/29/2021    CO2 28 09/29/2021    BUN 11 09/29/2021    CREATININE 1.0 09/29/2021    GFRAA >60 09/29/2021    LABGLOM 55 09/29/2021    GLUCOSE 108 09/29/2021    CALCIUM 8.9 09/29/2021     BMP:    Lab Results   Component Value Date     09/29/2021    K 4.1 09/29/2021    K 3.8 09/23/2021     09/29/2021    CO2 28 09/29/2021    BUN 11 09/29/2021    CREATININE 1.0 09/29/2021    CALCIUM 8.9 09/29/2021    GFRAA >60 09/29/2021    LABGLOM 55 09/29/2021    GLUCOSE 108 09/29/2021 Hepatic Function Panel:  No results found for: ALKPHOS, ALT, AST, PROT, BILITOT, BILIDIR, IBILI, LABALBU  Uric Acid:  No results found for: LABURIC, URICACID  PT/INR:  No results found for: PROTIME, INR  Warfarin PT/INR:  No components found for: PTPATWAR, PTINRWAR  PTT:  No results found for: APTT, PTT[APTT}  U/A:    Lab Results   Component Value Date    COLORU Yellow 09/23/2021    PROTEINU >=300 09/23/2021    PHUR 5.5 09/23/2021    WBCUA 10-20 09/23/2021    RBCUA >20 09/23/2021    YEAST Present 09/23/2021    BACTERIA MODERATE 09/23/2021    CLARITYU CLOUDY 09/23/2021    SPECGRAV 1.025 09/23/2021    LEUKOCYTESUR SMALL 09/23/2021    UROBILINOGEN 1.0 09/23/2021    BILIRUBINUR Negative 09/23/2021    BLOODU LARGE 09/23/2021    GLUCOSEU Negative 09/23/2021     HgBA1c:    Lab Results   Component Value Date    LABA1C 5.6 09/22/2021     Microalbumen/Creatinine ratio:  No components found for: RUCREAT      Medications Prior to Admission:    Not in a hospital admission. Social History:    reports that she has never smoked. She has never used smokeless tobacco. She reports current alcohol use. She reports that she does not use drugs. Family History:   family history is not on file. REVIEW OF SYSTEMS:  Review of Systems   Constitutional: Negative for chills, diaphoresis and fever. HENT: Negative for ear discharge, ear pain, hearing loss, nosebleeds and tinnitus. Respiratory: Negative for cough, shortness of breath and wheezing. Cardiovascular: Negative for chest pain. Gastrointestinal: Negative for abdominal pain, blood in stool, constipation, diarrhea, nausea and vomiting. Genitourinary: Negative for dysuria, flank pain and hematuria. Musculoskeletal: Negative for myalgias. Skin: Negative for rash. Neurological: Negative for headaches. Hematological: Does not bruise/bleed easily. Psychiatric/Behavioral: Negative for hallucinations and suicidal ideas.         PHYSICAL EXAM:    Vitals:  Vitals:    10/12/21 1356   BP: 138/70   Pulse: 68   Resp: 16   Temp: 97.6 °F (36.4 °C)       General:  Awake, alert, oriented X 3. Well developed, well nourished, 73 y.o. female. No apparent distress. HEENT:  Normocephalic, atraumatic. Pupils equal, round, reactive to light. No scleral icterus. No conjunctival injection. Normal lips, teeth, and gums. No nasal discharge. Neck:  Supple  Lungs:  CTA bilaterally, bilat symmetrical expansion, no wheeze, rales, or rhonchi  Heart:  RRR, no murmurs, gallops, rubs  Abdomen: Bowel sounds present, soft, nontender, no masses, no organomegaly, no peritoneal signs  Extremities:  negative   Skin:  Warm and dry, satisfactory turgor   Neuro:  Cranial nerves 2-12 intact, no focal deficits. Wound Metric Flow:   /70   Pulse 68   Temp 97.6 °F (36.4 °C) (Temporal)   Resp 16   Ht 5' 8\" (1.727 m)   Wt 127 lb (57.6 kg)   BMI 19.31 kg/m²   Wound serous exudate noted      Wound 10/12/21 Abdomen Right;Mid #1 (Active)   Wound Image   10/12/21 1404   Wound Length (cm) 0.7 cm 10/12/21 1404   Wound Width (cm) 5 cm 10/12/21 1404   Wound Depth (cm) 0.2 cm 10/12/21 1404   Wound Surface Area (cm^2) 3.5 cm^2 10/12/21 1404   Wound Volume (cm^3) 0.7 cm^3 10/12/21 1404   Post-Procedure Length (cm) 0.7 cm 10/12/21 1446   Post-Procedure Width (cm) 5.1 cm 10/12/21 1446   Post-Procedure Depth (cm) 0.3 cm 10/12/21 1446   Post-Procedure Surface Area (cm^2) 3.57 cm^2 10/12/21 1446   Post-Procedure Volume (cm^3) 1.071 cm^3 10/12/21 1446   Wound Assessment Bleeding 10/12/21 1404   Drainage Amount Moderate 10/12/21 1404   Drainage Description Sanguinous 10/12/21 1404   Odor None 10/12/21 1404   Margot-wound Assessment Maceration 10/12/21 1404   Number of days: 0               Procedure: Excisional Debridement: Wound # 1. At 3.5 cm sq. The patient was placed in the supine position. Lidocaine  gauze was applied  at beginning of wound evaluation.  An  Excisional Debridement was performed. Using a curette and tissue nippers ,  the wound was debrided sharply of all fibrotic, necrotic, and hyperkeratotic tissue, including a layer of surrounding healthy tissue to stimulate epithelization. The wound was excised through the level of the subcutaneous Wound Percentage debrided is 100%   Wound was irrigated with normal saline solution. Bleeding was with a small amount of bleeding, and controlled with pressure . Patient tolerated procedure well and was given proper instruction. 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 *       Diagnosis:           Abdomenal wall ulcer   @[id number])@              Plan:  1. H&P, General medical evaluation for the purpose of Comprehensive wound management for maximum healing and minimal morbidity. 2. Excisional Debridement. DC VAC start mupirocin 2%  3. We had a lengthy discussion about the diagnosis and aspects  to consider. ASSESSMENT/PLAN:    1. 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   Dispense: 30 g; Refill: [id number],        10/12/2021    2:54 PM

## 2021-10-19 ENCOUNTER — HOSPITAL ENCOUNTER (OUTPATIENT)
Dept: WOUND CARE | Age: 71
Discharge: HOME | End: 2021-10-19
Payer: MEDICARE

## 2021-10-19 VITALS
RESPIRATION RATE: 18 BRPM | DIASTOLIC BLOOD PRESSURE: 70 MMHG | SYSTOLIC BLOOD PRESSURE: 130 MMHG | HEART RATE: 76 BPM | TEMPERATURE: 97.4 F

## 2021-10-19 DIAGNOSIS — L03.90 SEPSIS DUE TO CELLULITIS (HCC): ICD-10-CM

## 2021-10-19 DIAGNOSIS — L98.493 ULCER OF ABDOMEN WALL WITH NECROSIS OF MUSCLE (HCC): Primary | ICD-10-CM

## 2021-10-19 DIAGNOSIS — A41.9 SEPSIS DUE TO CELLULITIS (HCC): ICD-10-CM

## 2021-10-19 PROCEDURE — 11042 DBRDMT SUBQ TIS 1ST 20SQCM/<: CPT | Performed by: FAMILY MEDICINE

## 2021-10-19 PROCEDURE — 11042 DBRDMT SUBQ TIS 1ST 20SQCM/<: CPT

## 2021-10-19 PROCEDURE — 6370000000 HC RX 637 (ALT 250 FOR IP): Performed by: FAMILY MEDICINE

## 2021-10-19 RX ORDER — GENTAMICIN SULFATE 1 MG/G
OINTMENT TOPICAL ONCE
Status: CANCELLED | OUTPATIENT
Start: 2021-10-19 | End: 2021-10-19

## 2021-10-19 RX ORDER — LIDOCAINE HYDROCHLORIDE 20 MG/ML
JELLY TOPICAL ONCE
Status: CANCELLED | OUTPATIENT
Start: 2021-10-19 | End: 2021-10-19

## 2021-10-19 RX ORDER — BETAMETHASONE DIPROPIONATE 0.05 %
OINTMENT (GRAM) TOPICAL ONCE
Status: CANCELLED | OUTPATIENT
Start: 2021-10-19 | End: 2021-10-19

## 2021-10-19 RX ORDER — LIDOCAINE HYDROCHLORIDE 40 MG/ML
SOLUTION TOPICAL ONCE
Status: CANCELLED | OUTPATIENT
Start: 2021-10-19 | End: 2021-10-19

## 2021-10-19 RX ORDER — LIDOCAINE HYDROCHLORIDE 40 MG/ML
SOLUTION TOPICAL ONCE
Status: COMPLETED | OUTPATIENT
Start: 2021-10-19 | End: 2021-10-19

## 2021-10-19 RX ORDER — LIDOCAINE 40 MG/G
CREAM TOPICAL ONCE
Status: CANCELLED | OUTPATIENT
Start: 2021-10-19 | End: 2021-10-19

## 2021-10-19 RX ORDER — GINSENG 100 MG
CAPSULE ORAL ONCE
Status: CANCELLED | OUTPATIENT
Start: 2021-10-19 | End: 2021-10-19

## 2021-10-19 RX ORDER — LIDOCAINE 50 MG/G
OINTMENT TOPICAL ONCE
Status: CANCELLED | OUTPATIENT
Start: 2021-10-19 | End: 2021-10-19

## 2021-10-19 RX ORDER — BACITRACIN, NEOMYCIN, POLYMYXIN B 400; 3.5; 5 [USP'U]/G; MG/G; [USP'U]/G
OINTMENT TOPICAL ONCE
Status: CANCELLED | OUTPATIENT
Start: 2021-10-19 | End: 2021-10-19

## 2021-10-19 RX ORDER — BACITRACIN ZINC AND POLYMYXIN B SULFATE 500; 1000 [USP'U]/G; [USP'U]/G
OINTMENT TOPICAL ONCE
Status: CANCELLED | OUTPATIENT
Start: 2021-10-19 | End: 2021-10-19

## 2021-10-19 RX ORDER — CLOBETASOL PROPIONATE 0.5 MG/G
OINTMENT TOPICAL ONCE
Status: CANCELLED | OUTPATIENT
Start: 2021-10-19 | End: 2021-10-19

## 2021-10-19 RX ADMIN — LIDOCAINE HYDROCHLORIDE 3 ML: 40 SOLUTION TOPICAL at 13:13

## 2021-10-19 NOTE — PROGRESS NOTES
Follow-Up Wound Progress Note    Amanda Shahid  AGE: 73 y.o. GENDER: female  DOD: 1950  TODAY'S DATE:  10/19/21  Subjective:    Amanda Shahid is a 73 y.o. female who presents today for wound check. Wound Etiology :  dehisced surgical wound  Wound Location :  midline abdomen Pain : {1/10     Abx : Absent       Overall Wound Assessment  Wound is has slightly improved. Size has decreased  Objective:    /70   Pulse 76   Temp 97.4 °F (36.3 °C) (Temporal)   Resp 18     Wound   serous exudate noted  Errythema - Absent     Wound 10/12/21 Abdomen Right;Mid #1 (Active)   Wound Image   10/12/21 1404   Dressing Status New dressing applied 10/19/21 1408   Wound Cleansed Cleansed with saline 10/19/21 1408   Dressing/Treatment Collagen;Silicone border 41/16/14 1408   Offloading for Diabetic Foot Ulcers No offloading required 10/12/21 1639   Wound Length (cm) 0.6 cm 10/19/21 1311   Wound Width (cm) 4.5 cm 10/19/21 1311   Wound Depth (cm) 0.2 cm 10/19/21 1311   Wound Surface Area (cm^2) 2.7 cm^2 10/19/21 1311   Change in Wound Size % (l*w) 22.86 10/19/21 1311   Wound Volume (cm^3) 0.54 cm^3 10/19/21 1311   Wound Healing % 23 10/19/21 1311   Post-Procedure Length (cm) 0.6 cm 10/19/21 1354   Post-Procedure Width (cm) 4.6 cm 10/19/21 1354   Post-Procedure Depth (cm) 0.3 cm 10/19/21 1354   Post-Procedure Surface Area (cm^2) 2.76 cm^2 10/19/21 1354   Post-Procedure Volume (cm^3) 0.828 cm^3 10/19/21 1354   Wound Assessment Pink/red 10/19/21 1311   Drainage Amount Moderate 10/19/21 1354   Drainage Description Sanguinous 10/19/21 1354   Odor None 10/19/21 1311   Margot-wound Assessment Intact 10/19/21 1311   Number of days: 7            Assessment:     Please refer to nursing measurements and assessment regarding wound size pre and post debridement. Wound check - Care provided includes removal of existing dressing and visual inspection    Procedure: Debridement Note: Wound # 1. At 2.7 cm sq.   The patient was placed in the sitting position. Lidocaine  gauze was applied  at beginning of wound evaluation. An  Excisional Debridement was performed. Using a curette ,  the wound was debrided sharply of all fibrotic, necrotic, and hyperkeratotic tissue, including a layer of surrounding healthy tissue to stimulate epithelization. The wound was excised through the level of the subcutaneous Wound Percentage debrided is 100%. Please refer to Nurses notes for pre and post debridement dimensions. Wound was irrigated with normal saline solution. Bleeding was with a small amount of bleeding, and controlled with pressure. Patient tolerated procedure well and was given proper instruction. 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 *          Plan:      Treatment & Plan: 1.Excisional Debridement                              2. Collagen                              3. Discussed appropriate home care of this wound. 4. Patient instructions were given. 5. Follow Up in 2 week(s).                                    Jevon Pinto DO   10/19/21     2:57 PM

## 2021-10-19 NOTE — PROGRESS NOTES
Post-Procedure Depth (cm) 0.3 cm 10/19/21 1354   Post-Procedure Surface Area (cm^2) 2.76 cm^2 10/19/21 1354   Post-Procedure Volume (cm^3) 0.828 cm^3 10/19/21 1354   Wound Assessment Pink/red 10/19/21 1311   Drainage Amount Moderate 10/19/21 1354   Drainage Description Sanguinous 10/19/21 1354   Odor None 10/19/21 1311   Margot-wound Assessment Intact 10/19/21 1311   Number of days: 7          Supplies Requested :      WOUND #: 1   PRIMARY DRESSING:  Other: Promogram   Cover and Secure with:  Other Border gauze dressing     FREQUENCY OF DRESSING CHANGES:  Two times per week     ADDITIONAL ITEMS:  [] Gloves Small  [x] Gloves Medium [] Gloves Large [] Gloves XLarge  [] Tape 1\" [x] Tape 2\" [] Tape 3\"  [] Medipore Tape  [x] Saline  [] Skin Prep   [] Adhesive Remover   [] Cotton Tip Applicators   [] Other:    Patient Wound(s) Debrided: [x] Yes if yes please add date 10/19/2021   [] No    Debribement Type: Excisional/Sharp    Patient currently being seen by Home Health: [] Yes   [x] No    Duration for needed supplies:  []15  []30  []60  [x]90 Days    Electronically signed by Rakesh Soler RN on 10/19/2021 at 2:58 PM     Provider Information:     PROVIDER'S NAME: Lainey Silva DO    NPI: [id number]

## 2021-10-25 LAB
FUNGUS (MYCOLOGY) CULTURE: NORMAL
FUNGUS STAIN: NORMAL

## 2021-11-02 ENCOUNTER — HOSPITAL ENCOUNTER (OUTPATIENT)
Dept: WOUND CARE | Age: 71
Discharge: HOME | End: 2021-11-02
Payer: MEDICARE

## 2021-11-02 VITALS
TEMPERATURE: 98.3 F | RESPIRATION RATE: 18 BRPM | SYSTOLIC BLOOD PRESSURE: 124 MMHG | HEART RATE: 83 BPM | DIASTOLIC BLOOD PRESSURE: 70 MMHG

## 2021-11-02 DIAGNOSIS — A41.9 SEPSIS DUE TO CELLULITIS (HCC): ICD-10-CM

## 2021-11-02 DIAGNOSIS — L98.493 ULCER OF ABDOMEN WALL WITH NECROSIS OF MUSCLE (HCC): Primary | ICD-10-CM

## 2021-11-02 DIAGNOSIS — L03.90 SEPSIS DUE TO CELLULITIS (HCC): ICD-10-CM

## 2021-11-02 PROCEDURE — 6370000000 HC RX 637 (ALT 250 FOR IP): Performed by: FAMILY MEDICINE

## 2021-11-02 PROCEDURE — 99213 OFFICE O/P EST LOW 20 MIN: CPT | Performed by: FAMILY MEDICINE

## 2021-11-02 PROCEDURE — 99213 OFFICE O/P EST LOW 20 MIN: CPT

## 2021-11-02 RX ORDER — LIDOCAINE HYDROCHLORIDE 40 MG/ML
SOLUTION TOPICAL ONCE
Status: COMPLETED | OUTPATIENT
Start: 2021-11-02 | End: 2021-11-02

## 2021-11-02 RX ORDER — CLOBETASOL PROPIONATE 0.5 MG/G
OINTMENT TOPICAL ONCE
OUTPATIENT
Start: 2021-11-02 | End: 2021-11-02

## 2021-11-02 RX ORDER — LIDOCAINE HYDROCHLORIDE 20 MG/ML
JELLY TOPICAL ONCE
OUTPATIENT
Start: 2021-11-02 | End: 2021-11-02

## 2021-11-02 RX ORDER — GENTAMICIN SULFATE 1 MG/G
OINTMENT TOPICAL ONCE
OUTPATIENT
Start: 2021-11-02 | End: 2021-11-02

## 2021-11-02 RX ORDER — BACITRACIN ZINC AND POLYMYXIN B SULFATE 500; 1000 [USP'U]/G; [USP'U]/G
OINTMENT TOPICAL ONCE
OUTPATIENT
Start: 2021-11-02 | End: 2021-11-02

## 2021-11-02 RX ORDER — LIDOCAINE 50 MG/G
OINTMENT TOPICAL ONCE
OUTPATIENT
Start: 2021-11-02 | End: 2021-11-02

## 2021-11-02 RX ORDER — LIDOCAINE HYDROCHLORIDE 40 MG/ML
SOLUTION TOPICAL ONCE
OUTPATIENT
Start: 2021-11-02 | End: 2021-11-02

## 2021-11-02 RX ORDER — BACITRACIN, NEOMYCIN, POLYMYXIN B 400; 3.5; 5 [USP'U]/G; MG/G; [USP'U]/G
OINTMENT TOPICAL ONCE
OUTPATIENT
Start: 2021-11-02 | End: 2021-11-02

## 2021-11-02 RX ORDER — BETAMETHASONE DIPROPIONATE 0.05 %
OINTMENT (GRAM) TOPICAL ONCE
OUTPATIENT
Start: 2021-11-02 | End: 2021-11-02

## 2021-11-02 RX ORDER — GINSENG 100 MG
CAPSULE ORAL ONCE
OUTPATIENT
Start: 2021-11-02 | End: 2021-11-02

## 2021-11-02 RX ORDER — LIDOCAINE 40 MG/G
CREAM TOPICAL ONCE
OUTPATIENT
Start: 2021-11-02 | End: 2021-11-02

## 2021-11-02 RX ADMIN — LIDOCAINE HYDROCHLORIDE: 40 SOLUTION TOPICAL at 13:14

## 2021-11-02 ASSESSMENT — PAIN SCALES - GENERAL: PAINLEVEL_OUTOF10: 0

## 2021-11-02 NOTE — PROGRESS NOTES
Wound Care Discharge Summary    Godfrey Carrel  73 y.o.   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 Measurements shown are from today's visit. Reason for Admission:  Principal Diagnosis:dehisced surgical wound  Secondary Diagnosis: Cellulitis  Procedures: Multiple serial debridements are recorded and these were performed at a time and in a manner that was deemed necessary over the course of therapy for the included conditions. Brief Summary of Patient's Course: Following initial Wound evaluation a comprehensive plan was formulated by the team to include weekly evaluation and debridement, nutritinal support, Glycemic control, infectious prevention, moisture balance, vascular competence and avoidance of trauma. 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 Diet:  common adult    Follow Up : To PCP's office in 3 weeks.      Jeanette Joe, D.O.      11/2/21 2:04 PM

## 2021-11-09 LAB
AFB CULTURE (MYCOBACTERIA): NORMAL
AFB SMEAR: NORMAL

## 2021-11-12 ENCOUNTER — OFFICE VISIT (OUTPATIENT)
Dept: FAMILY MEDICINE CLINIC | Age: 71
End: 2021-11-12
Payer: MEDICARE

## 2021-11-12 VITALS
WEIGHT: 135 LBS | OXYGEN SATURATION: 98 % | RESPIRATION RATE: 16 BRPM | SYSTOLIC BLOOD PRESSURE: 132 MMHG | TEMPERATURE: 97.2 F | HEART RATE: 70 BPM | DIASTOLIC BLOOD PRESSURE: 80 MMHG | BODY MASS INDEX: 20.46 KG/M2 | HEIGHT: 68 IN

## 2021-11-12 DIAGNOSIS — E78.00 PRIMARY HYPERCHOLESTEROLEMIA: ICD-10-CM

## 2021-11-12 DIAGNOSIS — E55.9 HYPOVITAMINOSIS D: ICD-10-CM

## 2021-11-12 DIAGNOSIS — Z76.89 ENCOUNTER TO ESTABLISH CARE: Primary | ICD-10-CM

## 2021-11-12 DIAGNOSIS — Z12.11 SCREEN FOR COLON CANCER: ICD-10-CM

## 2021-11-12 DIAGNOSIS — I10 ESSENTIAL HYPERTENSION: ICD-10-CM

## 2021-11-12 DIAGNOSIS — Z76.0 MEDICATION REFILL: ICD-10-CM

## 2021-11-12 PROCEDURE — 99203 OFFICE O/P NEW LOW 30 MIN: CPT | Performed by: FAMILY MEDICINE

## 2021-11-12 RX ORDER — HYDROCHLOROTHIAZIDE 12.5 MG/1
12.5 TABLET ORAL DAILY
Qty: 30 TABLET | Refills: 3 | Status: SHIPPED
Start: 2021-11-12 | End: 2022-01-24 | Stop reason: SDUPTHER

## 2021-11-12 RX ORDER — LISINOPRIL 40 MG/1
40 TABLET ORAL DAILY
Qty: 30 TABLET | Refills: 3 | Status: SHIPPED
Start: 2021-11-12 | End: 2022-05-03 | Stop reason: SDUPTHER

## 2021-11-12 RX ORDER — METOPROLOL TARTRATE 75 MG/1
75 TABLET, FILM COATED ORAL 2 TIMES DAILY
Qty: 60 TABLET | Refills: 3 | Status: SHIPPED
Start: 2021-11-12 | End: 2022-01-25 | Stop reason: SDUPTHER

## 2021-11-12 SDOH — ECONOMIC STABILITY: TRANSPORTATION INSECURITY
IN THE PAST 12 MONTHS, HAS LACK OF TRANSPORTATION KEPT YOU FROM MEETINGS, WORK, OR FROM GETTING THINGS NEEDED FOR DAILY LIVING?: NO

## 2021-11-12 SDOH — ECONOMIC STABILITY: INCOME INSECURITY: IN THE LAST 12 MONTHS, WAS THERE A TIME WHEN YOU WERE NOT ABLE TO PAY THE MORTGAGE OR RENT ON TIME?: NO

## 2021-11-12 SDOH — ECONOMIC STABILITY: FOOD INSECURITY: WITHIN THE PAST 12 MONTHS, THE FOOD YOU BOUGHT JUST DIDN'T LAST AND YOU DIDN'T HAVE MONEY TO GET MORE.: NEVER TRUE

## 2021-11-12 SDOH — ECONOMIC STABILITY: HOUSING INSECURITY: IN THE LAST 12 MONTHS, HOW MANY PLACES HAVE YOU LIVED?: 1

## 2021-11-12 SDOH — ECONOMIC STABILITY: TRANSPORTATION INSECURITY
IN THE PAST 12 MONTHS, HAS THE LACK OF TRANSPORTATION KEPT YOU FROM MEDICAL APPOINTMENTS OR FROM GETTING MEDICATIONS?: NO

## 2021-11-12 SDOH — ECONOMIC STABILITY: HOUSING INSECURITY
IN THE LAST 12 MONTHS, WAS THERE A TIME WHEN YOU DID NOT HAVE A STEADY PLACE TO SLEEP OR SLEPT IN A SHELTER (INCLUDING NOW)?: NO

## 2021-11-12 SDOH — ECONOMIC STABILITY: FOOD INSECURITY: WITHIN THE PAST 12 MONTHS, YOU WORRIED THAT YOUR FOOD WOULD RUN OUT BEFORE YOU GOT MONEY TO BUY MORE.: NEVER TRUE

## 2021-11-12 ASSESSMENT — PATIENT HEALTH QUESTIONNAIRE - PHQ9
SUM OF ALL RESPONSES TO PHQ QUESTIONS 1-9: 0
2. FEELING DOWN, DEPRESSED OR HOPELESS: 0
SUM OF ALL RESPONSES TO PHQ QUESTIONS 1-9: 0
SUM OF ALL RESPONSES TO PHQ QUESTIONS 1-9: 0
SUM OF ALL RESPONSES TO PHQ9 QUESTIONS 1 & 2: 0
1. LITTLE INTEREST OR PLEASURE IN DOING THINGS: 0

## 2021-11-12 ASSESSMENT — LIFESTYLE VARIABLES
HOW MANY STANDARD DRINKS CONTAINING ALCOHOL DO YOU HAVE ON A TYPICAL DAY: 1 OR 2
HOW OFTEN DO YOU HAVE A DRINK CONTAINING ALCOHOL: NEVER

## 2021-11-12 ASSESSMENT — SOCIAL DETERMINANTS OF HEALTH (SDOH): HOW HARD IS IT FOR YOU TO PAY FOR THE VERY BASICS LIKE FOOD, HOUSING, MEDICAL CARE, AND HEATING?: NOT HARD AT ALL

## 2021-11-12 NOTE — PROGRESS NOTES
OFFICE PROGRESS NOTE      SUBJECTIVE:        Patient ID:   Silvino Arzola is a 73 y.o. female who presents for   Chief Complaint   Patient presents with    Established New Doctor     Pt has not seen a provider in over 10 years            HPI:     ESTABLISH CARE   RECHECK BP, CHOLESTEROL. WAS HOSPITALIZED AT [facility] IN September 2021 FOR SEPSIS ON UPPER ABDOMINAL WOUND. WOUND HAS HEALED UP NOW. WAS FOUND TO HAVE HIGH BP DURING HOSPITALIZATION AND WAS STARTED ON MEDICATION, NEEDS  MEDICATION REFILL. FEELS GOOD. WATCHING DIET GOOD. WALKING FOR EXERCISE. TAKING MEDICATIONS REGULARLY. Prior to Admission medications    Medication Sig Start Date End Date Taking? Authorizing Provider   Multiple Vitamins-Minerals (CENTRUM SILVER 50+WOMEN) TABS Take by mouth   Yes Historical Provider, MD   Metoprolol Tartrate 75 MG TABS Take 75 mg by mouth 2 times daily 11/12/21  Yes Tj Wynn MD   lisinopril (PRINIVIL;ZESTRIL) 40 MG tablet Take 1 tablet by mouth daily 11/12/21  Yes Tj Wynn MD   hydroCHLOROthiazide (HYDRODIURIL) 12.5 MG tablet Take 1 tablet by mouth daily 11/12/21  Yes Tj Wynn MD     Social History     Socioeconomic History    Marital status:       Spouse name: None    Number of children: None    Years of education: None    Highest education level: None   Occupational History    None   Tobacco Use    Smoking status: Never Smoker    Smokeless tobacco: Never Used   Vaping Use    Vaping Use: Never used   Substance and Sexual Activity    Alcohol use: Yes     Comment: Socially    Drug use: Never    Sexual activity: Not Currently     Partners: Male   Other Topics Concern    None   Social History Narrative    None     Social Determinants of Health     Financial Resource Strain: Low Risk     Difficulty of Paying Living Expenses: Not hard at all   Food Insecurity: No Food Insecurity    Worried About [address] in the Last Year: Never true    Ran Out of Food in the Last Year: Never true   Transportation Needs: No Transportation Needs    Lack of Transportation (Medical): No    Lack of Transportation (Non-Medical): No   Physical Activity:     Days of Exercise per Week: Not on file    Minutes of Exercise per Session: Not on file   Stress:     Feeling of Stress : Not on file   Social Connections:     Frequency of Communication with Friends and Family: Not on file    Frequency of Social Gatherings with Friends and Family: Not on file    Attends Restorationism Services: Not on file    Active Member of [address] [place] [address] or Organizations: Not on file    Attends Club or Organization Meetings: Not on file    Marital Status: Not on file   Intimate Partner Violence:     Fear of Current or Ex-Partner: Not on file    Emotionally Abused: Not on file    Physically Abused: Not on file    Sexually Abused: Not on file   Housing Stability: Azael Mares Unable to Pay for Housing in the Last Year: No    Number of [place] in the Last Year: 1    Unstable Housing in the Last Year: No       I have reviewed Natasha's allergies, medications, problem list, medical, social and family history and have updated as needed in the electronic medical record  Review Of Systems:    Skin: no abnormal pigmentation, rash, scaling, itching, masses, hair or nail changes  Eyes: no blurring, diplopia, or eye pain  Ears/Nose/Throat: no hearing loss, tinnitus, vertigo, nosebleed, nasal congestion, rhinorrhea, sore throat  Respiratory: no cough, pleuritic chest pain, dyspnea, or wheezing  Cardiovascular: no chest pain, angina, dyspnea on exertion, orthopnea, PND, palpitations, or claudication  Gastrointestinal: no nausea, vomiting, heartburn, diarrhea, constipation, bloating,  abdominal pain, or blood per rectum. Appetite is good  Genitourinary: no urinary urgency, frequency, dysuria, nocturia, hesitancy, or incontinence  Musculoskeletal: joint pains off and on. Morning stiffness. Ambulating well  Neurologic: no paralysis, paresis, paresthesia, seizures, tremors, or headaches  Hematologic/Lymphatic/Immunologic: no anemia, abnormal bleeding/bruising, fever, chills, night sweats, swollen glands, or unexplained weight loss  Endocrine: no heat or cold intolerance and no polyphagia, polydipsia, or polyuria        OBJECTIVE:     VS:  Wt Readings from Last 3 Encounters:   11/12/21 135 lb (61.2 kg)   10/12/21 127 lb (57.6 kg)   10/06/21 127 lb (57.6 kg)     Temp Readings from Last 3 Encounters:   11/12/21 97.2 °F (36.2 °C)   11/02/21 98.3 °F (36.8 °C) (Temporal)   10/19/21 97.4 °F (36.3 °C) (Temporal)     BP Readings from Last 3 Encounters:   11/12/21 128/78   11/02/21 124/70   10/19/21 130/70        General appearance: Alert, Awake, Oriented times 3, no distress  Skin: Warm and dry  Head: Normocephalic. No masses, lesions or tenderness noted  Eyes: Conjunctivae appear normal. PERLE  Ears: External ears normal  Nose/Sinuses: Nares normal. Septum midline. Mucosa normal. No drainage  Oropharynx: Oropharynx clear with no exudate seen  Neck: Neck supple. No jugular venous distension, lymphadenopathy or thyromegaly Trachea midline  Chest:  Normal. Movements are Normal and Equal.  Lungs: Lungs clear to auscultation bilaterally. No ronchi, crackles or wheezes  Heart: S1 S2  Regular rate and rhythm. No rub, murmur or gallop  Abdomen: Abdomen soft, non-tender. BS normal. No masses, organomegaly. Back: Grossly Normal and Equal. DTR are Normal. SLR is Normal.  Extremities: Arthritic changes are noted. Movements are limited. Pedal pulses are normal.  Musculoskeletal: Muscular strength appears intact.  No joint effusion, tenderness, swelling or warmth  Neuro:  No focal motor or sensory deficits        ASSESSMENT     Patient Active Problem List    Diagnosis Date Noted    Primary hypercholesterolemia 11/12/2021    Ulcer of abdomen wall with necrosis of muscle ([address] 10/12/2021    Essential hypertension     [facility] 09/22/2021        Diagnosis:     ICD-10-CM    1. Encounter to establish care  Z76.89    2. Essential hypertension  I10 CBC Auto Differential   3. Primary hypercholesterolemia  E78.00 Comprehensive Metabolic Panel     Lipid Panel   4. Hypovitaminosis D  E55.9 Vitamin D 25 Hydroxy   5. Screen for colon cancer  Z12.11 Cologuard (For External Results Only)   6. Medication refill  Z76.0        PLAN:           Patient Instructions   LOW SALT FOR BLOOD PRESSURE CONTROL. LOW FAT DIET FOR CHOLESTEROL CONTROL. TAKE PRINIVIL 40 MG. HCT 12.5. MG. DAILY  AND METOPROLOL 75 MG. 2 TIMES A DAY  FOR BLOOD PRESSURE CONTROL. Antwan Cain REGULAR WALKING ADVISED. RETURN COLO GUARD KIT AFTER STOOL SMEAR. FASTING FOR LAB WORK ONE MORNING. NEXT APPOINTMENT IN 2 MONTHS. Return in about 2 months (around 1/12/2022) for FOLLOW UP VISIT. I have reviewed my findings and recommendations with Meghann Narayan.     Electronically signed by David Ibarra MD on 11/12/21 at 10:26 AM EST

## 2021-11-12 NOTE — PATIENT INSTRUCTIONS
LOW SALT FOR BLOOD PRESSURE CONTROL. LOW FAT DIET FOR CHOLESTEROL CONTROL. TAKE PRINIVIL 40 MG. HCT 12.5. MG. DAILY  AND METOPROLOL 75 MG. 2 TIMES A DAY  FOR BLOOD PRESSURE CONTROL. Cloteal German REGULAR WALKING ADVISED. RETURN COLO GUARD KIT AFTER STOOL SMEAR. FASTING FOR LAB WORK ONE MORNING. NEXT APPOINTMENT IN 2 MONTHS.

## 2022-01-05 DIAGNOSIS — E78.00 PRIMARY HYPERCHOLESTEROLEMIA: ICD-10-CM

## 2022-01-05 DIAGNOSIS — I10 ESSENTIAL HYPERTENSION: ICD-10-CM

## 2022-01-05 DIAGNOSIS — E55.9 HYPOVITAMINOSIS D: ICD-10-CM

## 2022-01-05 LAB
ALBUMIN SERPL-MCNC: 4.1 G/DL (ref 3.5–5.2)
ALP BLD-CCNC: 88 U/L (ref 35–104)
ALT SERPL-CCNC: 9 U/L (ref 0–32)
ANION GAP SERPL CALCULATED.3IONS-SCNC: 15 MMOL/L (ref 7–16)
AST SERPL-CCNC: 22 U/L (ref 0–31)
BASOPHILS ABSOLUTE: 0.09 E9/L (ref 0–0.2)
BASOPHILS RELATIVE PERCENT: 1.4 % (ref 0–2)
BILIRUB SERPL-MCNC: 0.4 MG/DL (ref 0–1.2)
BUN BLDV-MCNC: 37 MG/DL (ref 6–23)
CALCIUM SERPL-MCNC: 9.7 MG/DL (ref 8.6–10.2)
CHLORIDE BLD-SCNC: 102 MMOL/L (ref 98–107)
CHOLESTEROL, TOTAL: 198 MG/DL (ref 0–199)
CO2: 21 MMOL/L (ref 22–29)
CREAT SERPL-MCNC: 1.6 MG/DL (ref 0.5–1)
EOSINOPHILS ABSOLUTE: 0 E9/L (ref 0.05–0.5)
EOSINOPHILS RELATIVE PERCENT: 0 % (ref 0–6)
GFR AFRICAN AMERICAN: 39
GFR NON-AFRICAN AMERICAN: 32 ML/MIN/1.73
GLUCOSE BLD-MCNC: 97 MG/DL (ref 74–99)
HCT VFR BLD CALC: 34.2 % (ref 34–48)
HDLC SERPL-MCNC: 34 MG/DL
HEMOGLOBIN: 10.9 G/DL (ref 11.5–15.5)
IMMATURE GRANULOCYTES #: 0.01 E9/L
IMMATURE GRANULOCYTES %: 0.2 % (ref 0–5)
LDL CHOLESTEROL CALCULATED: 133 MG/DL (ref 0–99)
LYMPHOCYTES ABSOLUTE: 1.44 E9/L (ref 1.5–4)
LYMPHOCYTES RELATIVE PERCENT: 22.9 % (ref 20–42)
MCH RBC QN AUTO: 28.8 PG (ref 26–35)
MCHC RBC AUTO-ENTMCNC: 31.9 % (ref 32–34.5)
MCV RBC AUTO: 90.2 FL (ref 80–99.9)
MONOCYTES ABSOLUTE: 0.49 E9/L (ref 0.1–0.95)
MONOCYTES RELATIVE PERCENT: 7.8 % (ref 2–12)
NEUTROPHILS ABSOLUTE: 4.26 E9/L (ref 1.8–7.3)
NEUTROPHILS RELATIVE PERCENT: 67.7 % (ref 43–80)
PDW BLD-RTO: 15.5 FL (ref 11.5–15)
PLATELET # BLD: 249 E9/L (ref 130–450)
PMV BLD AUTO: 10.5 FL (ref 7–12)
POTASSIUM SERPL-SCNC: 5.4 MMOL/L (ref 3.5–5)
RBC # BLD: 3.79 E12/L (ref 3.5–5.5)
SODIUM BLD-SCNC: 138 MMOL/L (ref 132–146)
TOTAL PROTEIN: 7 G/DL (ref 6.4–8.3)
TRIGL SERPL-MCNC: 154 MG/DL (ref 0–149)
VITAMIN D 25-HYDROXY: 42 NG/ML (ref 30–100)
VLDLC SERPL CALC-MCNC: 31 MG/DL
WBC # BLD: 6.3 E9/L (ref 4.5–11.5)

## 2022-01-24 RX ORDER — HYDROCHLOROTHIAZIDE 12.5 MG/1
12.5 TABLET ORAL DAILY
Qty: 30 TABLET | Refills: 1 | Status: SHIPPED
Start: 2022-01-24 | End: 2022-05-03 | Stop reason: SDUPTHER

## 2022-01-24 NOTE — TELEPHONE ENCOUNTER
----- Message from Mary Cervantes sent at 1/24/2022  2:13 PM EST -----  Subject: Refill Request    QUESTIONS  Name of Medication? hydroCHLOROthiazide (HYDRODIURIL) 12.5 MG tablet  Patient-reported dosage and instructions? 12.5 MG 2 x daily  How many days do you have left? 7  [id number]  Pharmacy phone number (if available)? [phone]  ---------------------------------------------------------------------------  --------------  CALL BACK INFO  What is the best way for the office to contact you? OK to leave message on   voicemail  Preferred Call Back Phone Number?  [phone]

## 2022-01-26 RX ORDER — METOPROLOL TARTRATE 75 MG/1
75 TABLET, FILM COATED ORAL 2 TIMES DAILY
Qty: 60 TABLET | Refills: 3 | Status: SHIPPED
Start: 2022-01-26 | End: 2022-05-03 | Stop reason: SDUPTHER

## 2022-03-03 ENCOUNTER — OFFICE VISIT (OUTPATIENT)
Dept: FAMILY MEDICINE CLINIC | Age: 72
End: 2022-03-03
Payer: MEDICARE

## 2022-03-03 VITALS
OXYGEN SATURATION: 99 % | BODY MASS INDEX: 20.68 KG/M2 | HEART RATE: 71 BPM | SYSTOLIC BLOOD PRESSURE: 132 MMHG | TEMPERATURE: 97 F | WEIGHT: 136 LBS | DIASTOLIC BLOOD PRESSURE: 74 MMHG

## 2022-03-03 DIAGNOSIS — E78.00 PRIMARY HYPERCHOLESTEROLEMIA: Primary | ICD-10-CM

## 2022-03-03 DIAGNOSIS — I10 ESSENTIAL HYPERTENSION: ICD-10-CM

## 2022-03-03 DIAGNOSIS — N18.31 STAGE 3A CHRONIC KIDNEY DISEASE (HCC): ICD-10-CM

## 2022-03-03 DIAGNOSIS — D64.9 NORMOCYTIC NORMOCHROMIC ANEMIA: ICD-10-CM

## 2022-03-03 PROCEDURE — 99213 OFFICE O/P EST LOW 20 MIN: CPT | Performed by: FAMILY MEDICINE

## 2022-03-03 RX ORDER — FERROUS SULFATE 325(65) MG
325 TABLET ORAL 2 TIMES DAILY
Qty: 180 TABLET | Refills: 1 | Status: SHIPPED
Start: 2022-03-03 | End: 2022-05-03 | Stop reason: SDUPTHER

## 2022-03-03 NOTE — PATIENT INSTRUCTIONS
LOW SALT FOR BLOOD PRESSURE CONTROL. LOW FAT DIET FOR CHOLESTEROL CONTROL. TAKE PRINIVIL 40 MG. HCT 12.5. MG. DAILY  AND METOPROLOL 75 MG. 2 TIMES A DAY  FOR BLOOD PRESSURE CONTROL. Jad De La Fuente TAKE FEOSOL 1 TAB. 2 TIMES A DAY TO IMPROVE BLOOD COUNT. REGULAR WALKING ADVISED.   NEXT APPOINTMENT IN 2 MONTHS FOR ANNUAL PHYSICAL EXAMINATION

## 2022-03-03 NOTE — PROGRESS NOTES
OFFICE PROGRESS NOTE      SUBJECTIVE:        Patient ID:   Janet Tang is a 73 y.o. female who presents for   Chief Complaint   Patient presents with   [address]    Hyperlipidemia    Hypertension    Health Maintenance     due for AWV, does not want mammogram           HPI:     RECHECK BP, CHOLESTEROL AND DISCUSS BLOOD TEST RESULTS. MEDICATION REFILL. FEELS GOOD. WATCHING DIET GOOD. WALKING FOR EXERCISE. TAKING MEDICATIONS REGULARLY. Prior to Admission medications    Medication Sig Start Date End Date Taking? Authorizing Provider   ferrous sulfate (IRON 325) 325 (65 Fe) MG tablet Take 1 tablet by mouth 2 times daily 3/3/22  Yes Susan Bray MD   Metoprolol Tartrate 75 MG TABS Take 75 mg by mouth 2 times daily 1/26/22  Yes Susan Bray MD   hydroCHLOROthiazide (HYDRODIURIL) 12.5 MG tablet Take 1 tablet by mouth daily 1/24/22  Yes Susan Bray MD   Multiple Vitamins-Minerals (CENTRUM SILVER 50+WOMEN) TABS Take by mouth   Yes Historical Provider, MD   lisinopril (PRINIVIL;ZESTRIL) 40 MG tablet Take 1 tablet by mouth daily 11/12/21  Yes Susan Bray MD     Social History     Socioeconomic History    Marital status:       Spouse name: Not on file    Number of children: Not on file    Years of education: Not on file    Highest education level: Not on file   Occupational History    Not on file   Tobacco Use    Smoking status: Never Smoker    Smokeless tobacco: Never Used   Vaping Use    Vaping Use: Never used   Substance and Sexual Activity    Alcohol use: Yes     Comment: Socially    Drug use: Never    Sexual activity: Not Currently     Partners: Male   Other Topics Concern    Not on file   Social History Narrative    Not on file     Social Determinants of Health     Financial Resource Strain: Low Risk     Difficulty of Paying Living Expenses: Not hard at all   Food Insecurity: No Food Insecurity    Worried About Running Out of Food in the Last Year: Never true    [address] in the Last Year: Never true   Transportation Needs: No Transportation Needs    Lack of Transportation (Medical): No    Lack of Transportation (Non-Medical): No   Physical Activity:     Days of Exercise per Week: Not on file    Minutes of Exercise per Session: Not on file   Stress:     Feeling of Stress : Not on file   Social Connections:     Frequency of Communication with Friends and Family: Not on file    Frequency of Social Gatherings with Friends and Family: Not on file    Attends Mu-ism Services: Not on file    Active Member of [address] [place] [address] or Organizations: Not on file    Attends Club or Organization Meetings: Not on file    Marital Status: Not on file   Intimate Partner Violence:     Fear of Current or Ex-Partner: Not on file    Emotionally Abused: Not on file    Physically Abused: Not on file    Sexually Abused: Not on file   Housing Stability: Azael Mares Unable to Pay for Housing in the Last Year: No    Number of [place] in the Last Year: 1    Unstable Housing in the Last Year: No       I have reviewed Natasha's allergies, medications, problem list, medical, social and family history and have updated as needed in the electronic medical record  Review Of Systems:    Skin: no abnormal pigmentation, rash, scaling, itching, masses, hair or nail changes  Eyes: no blurring, diplopia, or eye pain  Ears/Nose/Throat: no hearing loss, tinnitus, vertigo, nosebleed, nasal congestion, rhinorrhea, sore throat  Respiratory: no cough, pleuritic chest pain, dyspnea, or wheezing  Cardiovascular: no chest pain, angina, dyspnea on exertion, orthopnea, PND, palpitations, or claudication  Gastrointestinal: no nausea, vomiting, heartburn, diarrhea, constipation, bloating,  abdominal pain, or blood per rectum. Appetite is good  Genitourinary: no urinary urgency, frequency, dysuria, nocturia, hesitancy, or incontinence  Musculoskeletal: joint pains off and on. Morning stiffness. Ambulating well  Neurologic: no paralysis, paresis, paresthesia, seizures, tremors, or headaches  Hematologic/Lymphatic/Immunologic: no anemia, abnormal bleeding/bruising, fever, chills, night sweats, swollen glands, or unexplained weight loss  Endocrine: no heat or cold intolerance and no polyphagia, polydipsia, or polyuria        OBJECTIVE:     VS:  Wt Readings from Last 3 Encounters:   03/03/22 136 lb (61.7 kg)   11/12/21 135 lb (61.2 kg)   10/12/21 127 lb (57.6 kg)     Temp Readings from Last 3 Encounters:   03/03/22 97 °F (36.1 °C)   11/12/21 97.2 °F (36.2 °C)   11/02/21 98.3 °F (36.8 °C) (Temporal)     BP Readings from Last 3 Encounters:   03/03/22 132/74   11/12/21 132/80   11/02/21 124/70        General appearance: Alert, Awake, Oriented times 3, no distress  Skin: Warm and dry  Head: Normocephalic. No masses, lesions or tenderness noted  Eyes: Conjunctivae appear normal. PERLE  Ears: External ears normal  Nose/Sinuses: Nares normal. Septum midline. Mucosa normal. No drainage  Oropharynx: Oropharynx clear with no exudate seen  Neck: Neck supple. No jugular venous distension, lymphadenopathy or thyromegaly Trachea midline  Chest:  Normal. Movements are Normal and Equal.  Lungs: Lungs clear to auscultation bilaterally. No ronchi, crackles or wheezes  Heart: S1 S2  Regular rate and rhythm. No rub, murmur or gallop  Abdomen: Abdomen soft, non-tender. BS normal. No masses, organomegaly. Back: Grossly Normal and Equal. DTR are Normal. SLR is Normal.  Extremities: Arthritic changes are noted. Movements are limited. Pedal pulses are normal.  Musculoskeletal: Muscular strength appears intact.  No joint effusion, tenderness, swelling or warmth  Neuro:  No focal motor or sensory deficits        ASSESSMENT     Patient Active Problem List    Diagnosis Date Noted    Normocytic normochromic anemia 03/03/2022    Primary hypercholesterolemia 11/12/2021    Ulcer of abdomen wall with necrosis of muscle ([facility] 10/12/2021    Essential hypertension     Sepsis due to cellulitis ([facility] 09/22/2021        Diagnosis:     ICD-10-CM    1. Primary hypercholesterolemia  E78.00    2. Essential hypertension  I10    3. Normocytic normochromic anemia  D64.9        PLAN:           Patient Instructions   LOW SALT FOR BLOOD PRESSURE CONTROL. LOW FAT DIET FOR CHOLESTEROL CONTROL. TAKE PRINIVIL 40 MG. HCT 12.5. MG. DAILY  AND METOPROLOL 75 MG. 2 TIMES A DAY  FOR BLOOD PRESSURE CONTROL. Omar Bernard TAKE FEOSOL 1 TAB. 2 TIMES A DAY TO IMPROVE BLOOD COUNT. REGULAR WALKING ADVISED. NEXT APPOINTMENT IN 2 MONTHS FOR ANNUAL PHYSICAL EXAMINATION       Return in about 2 months (around 5/3/2022) for [employer]. .         I have reviewed my findings and recommendations with Leslie Copeland.     Electronically signed by Enrique Mcdaniel MD on 3/3/22 at 9:37 AM EST

## 2022-05-03 ENCOUNTER — OFFICE VISIT (OUTPATIENT)
Dept: FAMILY MEDICINE CLINIC | Age: 72
End: 2022-05-03
Payer: MEDICARE

## 2022-05-03 VITALS
OXYGEN SATURATION: 100 % | WEIGHT: 136 LBS | HEART RATE: 71 BPM | TEMPERATURE: 96.4 F | SYSTOLIC BLOOD PRESSURE: 122 MMHG | BODY MASS INDEX: 20.61 KG/M2 | DIASTOLIC BLOOD PRESSURE: 80 MMHG | HEIGHT: 68 IN

## 2022-05-03 DIAGNOSIS — I10 ESSENTIAL HYPERTENSION: ICD-10-CM

## 2022-05-03 DIAGNOSIS — E78.00 PRIMARY HYPERCHOLESTEROLEMIA: ICD-10-CM

## 2022-05-03 DIAGNOSIS — N18.31 STAGE 3A CHRONIC KIDNEY DISEASE (HCC): ICD-10-CM

## 2022-05-03 DIAGNOSIS — Z00.00 INITIAL MEDICARE ANNUAL WELLNESS VISIT: Primary | ICD-10-CM

## 2022-05-03 DIAGNOSIS — D64.9 NORMOCYTIC NORMOCHROMIC ANEMIA: ICD-10-CM

## 2022-05-03 DIAGNOSIS — E55.9 HYPOVITAMINOSIS D: ICD-10-CM

## 2022-05-03 PROCEDURE — G0438 PPPS, INITIAL VISIT: HCPCS | Performed by: FAMILY MEDICINE

## 2022-05-03 RX ORDER — FERROUS SULFATE 325(65) MG
325 TABLET ORAL 2 TIMES DAILY
Qty: 180 TABLET | Refills: 1 | Status: SHIPPED
Start: 2022-05-03 | End: 2022-08-03 | Stop reason: SDUPTHER

## 2022-05-03 RX ORDER — LISINOPRIL 40 MG/1
40 TABLET ORAL DAILY
Qty: 30 TABLET | Refills: 3 | Status: SHIPPED
Start: 2022-05-03 | End: 2022-09-15

## 2022-05-03 RX ORDER — METOPROLOL TARTRATE 75 MG/1
75 TABLET, FILM COATED ORAL 2 TIMES DAILY
Qty: 60 TABLET | Refills: 3 | Status: SHIPPED
Start: 2022-05-03 | End: 2022-09-15

## 2022-05-03 RX ORDER — HYDROCHLOROTHIAZIDE 12.5 MG/1
12.5 TABLET ORAL DAILY
Qty: 30 TABLET | Refills: 1 | Status: SHIPPED
Start: 2022-05-03 | End: 2022-09-15

## 2022-05-03 ASSESSMENT — LIFESTYLE VARIABLES
HOW OFTEN DO YOU HAVE A DRINK CONTAINING ALCOHOL: MONTHLY OR LESS
HOW MANY STANDARD DRINKS CONTAINING ALCOHOL DO YOU HAVE ON A TYPICAL DAY: 1 OR 2

## 2022-05-03 ASSESSMENT — VISUAL ACUITY
OS_CC: 20/20
OD_CC: 20/20

## 2022-05-03 ASSESSMENT — PATIENT HEALTH QUESTIONNAIRE - PHQ9
SUM OF ALL RESPONSES TO PHQ9 QUESTIONS 1 & 2: 0
SUM OF ALL RESPONSES TO PHQ QUESTIONS 1-9: 0
1. LITTLE INTEREST OR PLEASURE IN DOING THINGS: 0
SUM OF ALL RESPONSES TO PHQ QUESTIONS 1-9: 0
SUM OF ALL RESPONSES TO PHQ QUESTIONS 1-9: 0
2. FEELING DOWN, DEPRESSED OR HOPELESS: 0
SUM OF ALL RESPONSES TO PHQ QUESTIONS 1-9: 0

## 2022-05-03 NOTE — PATIENT INSTRUCTIONS
LOW SALT FOR BLOOD PRESSURE CONTROL. LOW FAT DIET FOR CHOLESTEROL CONTROL. TAKE PRINIVIL 40 MG. AND HCT 12.5. MG. DAILY  AND METOPROLOL 75 MG. 2 TIMES A DAY  FOR BLOOD PRESSURE CONTROL. Keo Bowman TAKE FEOSOL 1 TAB. 2 TIMES A DAY TO IMPROVE BLOOD COUNT. REGULAR WALKING ADVISED. NEXT APPOINTMENT IN 3 MONTHS. Personalized Preventive Plan for Carmen Garcia - 5/3/2022  Medicare offers a range of preventive health benefits. Some of the tests and screenings are paid in full while other may be subject to a deductible, co-insurance, and/or copay. Some of these benefits include a comprehensive review of your medical history including lifestyle, illnesses that may run in your family, and various assessments and screenings as appropriate. After reviewing your medical record and screening and assessments performed today your provider may have ordered immunizations, labs, imaging, and/or referrals for you. A list of these orders (if applicable) as well as your Preventive Care list are included within your After Visit Summary for your review. Other Preventive Recommendations:    · A preventive eye exam performed by an eye specialist is recommended every 1-2 years to screen for glaucoma; cataracts, macular degeneration, and other eye disorders. · A preventive dental visit is recommended every 6 months. · Try to get at least 150 minutes of exercise per week or 10,000 steps per day on a pedometer . · Order or download the FREE \"Exercise & Physical Activity: Your Everyday Guide\" from [employer]. Call 6-414.807.1144 or search [employer] online. · You need 3969-5895 mg of calcium and 1635-1774 IU of vitamin D per day. It is possible to meet your calcium requirement with diet alone, but a vitamin D supplement is usually necessary to meet this goal.  · When exposed to the sun, use a sunscreen that protects against both UVA and UVB radiation with an SPF of 30 or greater.  Reapply every 2 to 3 hours or after sweating, drying off with a towel, or swimming. · Always wear a seat belt when traveling in a car. Always wear a helmet when riding a bicycle or motorcycle.

## 2022-05-03 NOTE — PROGRESS NOTES
Medicare Annual Wellness Visit    Hector Emery is here for Medicare AWV    Assessment & Plan   Initial Medicare annual wellness visit  Primary hypercholesterolemia  -     Comprehensive Metabolic Panel; Future  -     Lipid Panel; Future  Essential hypertension  Normocytic normochromic anemia  -     CBC with Auto Differential; Future  Stage 3a chronic kidney disease (HCC)  Hypovitaminosis D  -     Vitamin D 25 Hydroxy; Future      Recommendations for Preventive Services Due: see orders and patient instructions/AVS.  Recommended screening schedule for the next 5-10 years is provided to the patient in written form: see Patient Instructions/AVS.     Return in 3 months (on 8/3/2022) for FOLLOW UP VISIT AND Medicare Annual Wellness Visit in 1 year. Subjective   The following acute and/or chronic problems were also addressed today:  AS LISTED ABOVE     Patient's complete Health Risk Assessment and screening values have been reviewed and are found in Flowsheets. The following problems were reviewed today and where indicated follow up appointments were made and/or referrals ordered.     Positive Risk Factor Screenings with Interventions:               General Health and ACP:  General  In general, how would you say your health is?: Very Good  In the past 7 days, have you experienced any of the following: New or Increased Pain, New or Increased Fatigue, Loneliness, Social Isolation, Stress or Anger?: No  Do you get the social and emotional support that you need?: Yes  Do you have a Living Will?: Yes    Advance Directives     Power of  Living Will ACP-Advance Directive ACP-Power of     Not on File Not on File Not on File Not on File      General Health Risk Interventions:  · No Living Will: ACP documents already completed- patient asked to provide copy to the office              Objective   Vitals:    05/03/22 0845   BP: 122/80   Pulse: 71   Temp: 96.4 °F (35.8 °C)   SpO2: 100%   Weight: 136 lb (61.7 kg) Height: 5' 8\" (1.727 m)      Body mass index is 20.68 kg/m². General Appearance: alert and oriented to person, place and time, well developed and well- nourished, in no acute distress  Skin: warm and dry, no rash or erythema  Head: normocephalic and atraumatic  Eyes: pupils equal, round, and reactive to light, extraocular eye movements intact, conjunctivae normal  ENT: tympanic membrane, external ear and ear canal normal bilaterally, nose without deformity, nasal mucosa and turbinates normal without polyps  Neck: supple and non-tender without mass, no thyromegaly or thyroid nodules, no cervical lymphadenopathy  Pulmonary/Chest: clear to auscultation bilaterally- no wheezes, rales or rhonchi, normal air movement, no respiratory distress  Cardiovascular: normal rate, regular rhythm, normal S1 and S2, no murmurs, rubs, clicks, or gallops, distal pulses intact, no carotid bruits  Abdomen: soft, non-tender, non-distended, normal bowel sounds, no masses or organomegaly  Extremities: no cyanosis, clubbing or edema  Musculoskeletal: normal range of motion, no joint swelling, deformity or tenderness  Neurologic: reflexes normal and symmetric, no cranial nerve deficit, gait, coordination and speech normal       No Known Allergies  Prior to Visit Medications    Medication Sig Taking?  Authorizing Provider   ferrous sulfate (IRON 325) 325 (65 Fe) MG tablet Take 1 tablet by mouth 2 times daily Yes Marcos Luna MD   hydroCHLOROthiazide (HYDRODIURIL) 12.5 MG tablet Take 1 tablet by mouth daily Yes Marcos Luna MD   lisinopril (PRINIVIL;ZESTRIL) 40 MG tablet Take 1 tablet by mouth daily Yes Marcos Luna MD   Metoprolol Tartrate 75 MG TABS Take 75 mg by mouth 2 times daily Yes Marcos Luna MD   Multiple Vitamins-Minerals (CENTRUM SILVER 50+WOMEN) TABS Take by mouth Yes Historical Provider, MD Tomas (Including outside providers/suppliers regularly involved in providing care):   Patient Care Team:  Steve Childress Avila Alicea MD as PCP - General (Family Medicine)  Abdoulaye Morejon MD as PCP - [facility] Empaneled Provider    Reviewed and updated this visit:  Tobacco  Allergies  Meds  Med Hx  Surg Hx  Soc Hx  Fam Hx

## 2022-05-24 RX ORDER — HYDROCHLOROTHIAZIDE 12.5 MG/1
12.5 TABLET ORAL DAILY
Qty: 30 TABLET | Refills: 1 | OUTPATIENT
Start: 2022-05-24

## 2022-07-27 DIAGNOSIS — E55.9 HYPOVITAMINOSIS D: ICD-10-CM

## 2022-07-27 DIAGNOSIS — D64.9 NORMOCYTIC NORMOCHROMIC ANEMIA: ICD-10-CM

## 2022-07-27 DIAGNOSIS — E78.00 PRIMARY HYPERCHOLESTEROLEMIA: ICD-10-CM

## 2022-07-27 LAB
BASOPHILS ABSOLUTE: 0.04 E9/L (ref 0–0.2)
BASOPHILS RELATIVE PERCENT: 0.7 % (ref 0–2)
EOSINOPHILS ABSOLUTE: 0 E9/L (ref 0.05–0.5)
EOSINOPHILS RELATIVE PERCENT: 0 % (ref 0–6)
HCT VFR BLD CALC: 35.3 % (ref 34–48)
HEMOGLOBIN: 11.1 G/DL (ref 11.5–15.5)
IMMATURE GRANULOCYTES #: 0.01 E9/L
IMMATURE GRANULOCYTES %: 0.2 % (ref 0–5)
LYMPHOCYTES ABSOLUTE: 1.28 E9/L (ref 1.5–4)
LYMPHOCYTES RELATIVE PERCENT: 21.9 % (ref 20–42)
MCH RBC QN AUTO: 29.4 PG (ref 26–35)
MCHC RBC AUTO-ENTMCNC: 31.4 % (ref 32–34.5)
MCV RBC AUTO: 93.6 FL (ref 80–99.9)
MONOCYTES ABSOLUTE: 0.45 E9/L (ref 0.1–0.95)
MONOCYTES RELATIVE PERCENT: 7.7 % (ref 2–12)
NEUTROPHILS ABSOLUTE: 4.06 E9/L (ref 1.8–7.3)
NEUTROPHILS RELATIVE PERCENT: 69.5 % (ref 43–80)
PDW BLD-RTO: 12.9 FL (ref 11.5–15)
PLATELET # BLD: 192 E9/L (ref 130–450)
PMV BLD AUTO: 11.1 FL (ref 7–12)
RBC # BLD: 3.77 E12/L (ref 3.5–5.5)
WBC # BLD: 5.8 E9/L (ref 4.5–11.5)

## 2022-07-28 DIAGNOSIS — N18.31 STAGE 3A CHRONIC KIDNEY DISEASE (HCC): Primary | ICD-10-CM

## 2022-07-28 DIAGNOSIS — I10 ESSENTIAL HYPERTENSION: Primary | ICD-10-CM

## 2022-07-28 LAB
ALBUMIN SERPL-MCNC: 4.4 G/DL (ref 3.5–5.2)
ALP BLD-CCNC: 81 U/L (ref 35–104)
ALT SERPL-CCNC: 8 U/L (ref 0–32)
ANION GAP SERPL CALCULATED.3IONS-SCNC: 11 MMOL/L (ref 7–16)
AST SERPL-CCNC: 26 U/L (ref 0–31)
BILIRUB SERPL-MCNC: 0.5 MG/DL (ref 0–1.2)
BUN BLDV-MCNC: 30 MG/DL (ref 6–23)
CALCIUM SERPL-MCNC: 9.5 MG/DL (ref 8.6–10.2)
CHLORIDE BLD-SCNC: 106 MMOL/L (ref 98–107)
CHOLESTEROL, TOTAL: 179 MG/DL (ref 0–199)
CO2: 20 MMOL/L (ref 22–29)
CREAT SERPL-MCNC: 2 MG/DL (ref 0.5–1)
GFR AFRICAN AMERICAN: 30
GFR NON-AFRICAN AMERICAN: 25 ML/MIN/1.73
GLUCOSE BLD-MCNC: 103 MG/DL (ref 74–99)
HDLC SERPL-MCNC: 34 MG/DL
LDL CHOLESTEROL CALCULATED: 112 MG/DL (ref 0–99)
POTASSIUM SERPL-SCNC: 6.2 MMOL/L (ref 3.5–5)
SODIUM BLD-SCNC: 137 MMOL/L (ref 132–146)
TOTAL PROTEIN: 7.3 G/DL (ref 6.4–8.3)
TRIGL SERPL-MCNC: 163 MG/DL (ref 0–149)
VITAMIN D 25-HYDROXY: 68 NG/ML (ref 30–100)
VLDLC SERPL CALC-MCNC: 33 MG/DL

## 2022-07-28 RX ORDER — SODIUM POLYSTYRENE SULFONATE 15 G/60ML
SUSPENSION ORAL; RECTAL
Qty: 60 ML | Refills: 0 | Status: SHIPPED
Start: 2022-07-28 | End: 2022-11-03 | Stop reason: ALTCHOICE

## 2022-07-28 NOTE — RESULT ENCOUNTER NOTE
VITAMIN D LEVEL HIGH NORMAL. STOP TAKING  VITAMIN D-3 . CHOLESTEROL LEVEL IS HIGH. LOW SALT, LOW CARB. AND LOW FAT DIET. REGULAR EXERCISE. CONTINUE CURRENT MEDICATIONS. DISCUSS NEXT VISIT. PLEASE ACKNOWLEDGE RECEIPT OF INFORMATION BY REPLYING THE MESSAGE. THANKS.

## 2022-08-03 ENCOUNTER — OFFICE VISIT (OUTPATIENT)
Dept: FAMILY MEDICINE CLINIC | Age: 72
End: 2022-08-03
Payer: MEDICARE

## 2022-08-03 VITALS
SYSTOLIC BLOOD PRESSURE: 120 MMHG | OXYGEN SATURATION: 98 % | DIASTOLIC BLOOD PRESSURE: 80 MMHG | WEIGHT: 131 LBS | BODY MASS INDEX: 19.92 KG/M2 | TEMPERATURE: 97 F | HEART RATE: 65 BPM

## 2022-08-03 DIAGNOSIS — E78.2 MIXED HYPERCHOLESTEROLEMIA AND HYPERTRIGLYCERIDEMIA: ICD-10-CM

## 2022-08-03 DIAGNOSIS — E87.5 HYPERKALEMIA: ICD-10-CM

## 2022-08-03 DIAGNOSIS — I10 ESSENTIAL HYPERTENSION: ICD-10-CM

## 2022-08-03 DIAGNOSIS — D64.9 NORMOCYTIC NORMOCHROMIC ANEMIA: ICD-10-CM

## 2022-08-03 DIAGNOSIS — I10 ESSENTIAL HYPERTENSION: Primary | ICD-10-CM

## 2022-08-03 DIAGNOSIS — N18.4 CKD (CHRONIC KIDNEY DISEASE) STAGE 4, GFR 15-29 ML/MIN (HCC): ICD-10-CM

## 2022-08-03 PROBLEM — E78.00 PRIMARY HYPERCHOLESTEROLEMIA: Status: RESOLVED | Noted: 2021-11-12 | Resolved: 2022-08-03

## 2022-08-03 LAB
ALBUMIN SERPL-MCNC: 4.1 G/DL (ref 3.5–5.2)
ALP BLD-CCNC: 82 U/L (ref 35–104)
ALT SERPL-CCNC: 7 U/L (ref 0–32)
ANION GAP SERPL CALCULATED.3IONS-SCNC: 9 MMOL/L (ref 7–16)
AST SERPL-CCNC: 22 U/L (ref 0–31)
BILIRUB SERPL-MCNC: 0.4 MG/DL (ref 0–1.2)
BUN BLDV-MCNC: 30 MG/DL (ref 6–23)
CALCIUM SERPL-MCNC: 9.3 MG/DL (ref 8.6–10.2)
CHLORIDE BLD-SCNC: 105 MMOL/L (ref 98–107)
CO2: 23 MMOL/L (ref 22–29)
CREAT SERPL-MCNC: 1.8 MG/DL (ref 0.5–1)
GFR AFRICAN AMERICAN: 34
GFR NON-AFRICAN AMERICAN: 28 ML/MIN/1.73
GLUCOSE BLD-MCNC: 141 MG/DL (ref 74–99)
POTASSIUM SERPL-SCNC: 5.2 MMOL/L (ref 3.5–5)
SODIUM BLD-SCNC: 137 MMOL/L (ref 132–146)
TOTAL PROTEIN: 6.7 G/DL (ref 6.4–8.3)

## 2022-08-03 PROCEDURE — 99214 OFFICE O/P EST MOD 30 MIN: CPT | Performed by: FAMILY MEDICINE

## 2022-08-03 PROCEDURE — 1124F ACP DISCUSS-NO DSCNMKR DOCD: CPT | Performed by: FAMILY MEDICINE

## 2022-08-03 RX ORDER — FERROUS SULFATE 325(65) MG
325 TABLET ORAL 2 TIMES DAILY
Qty: 180 TABLET | Refills: 1 | Status: SHIPPED | OUTPATIENT
Start: 2022-08-03

## 2022-08-03 NOTE — PROGRESS NOTES
OFFICE PROGRESS NOTE      SUBJECTIVE:        Patient ID:   Jaycee Soto is a 81 y.o. female who presents for   Chief Complaint   Patient presents with    Discuss Labs     Potassium           HPI:     RECHECK BP, CHOLESTEROL AND CKD. NOT SEEN NEPHROLOGIST BEFORE. WOULD LIKE TO WAIT AT THIS TIME FOR REFERRAL. WOUND ON THE ABDOMEN HEALED COMPLETELY. NOT GOING TO WOUND CARE ANY MORE. MEDICATION REFILL. FEELS GOOD. WATCHING DIET GOOD. WALKING FOR EXERCISE. TAKING MEDICATIONS REGULARLY. Prior to Admission medications    Medication Sig Start Date End Date Taking? Authorizing Provider   ferrous sulfate (IRON 325) 325 (65 Fe) MG tablet Take 1 tablet by mouth in the morning and 1 tablet before bedtime. 8/3/22  Yes Philly Coats MD   hydroCHLOROthiazide (HYDRODIURIL) 12.5 MG tablet Take 1 tablet by mouth daily 5/3/22  Yes Philly Coats MD   lisinopril (PRINIVIL;ZESTRIL) 40 MG tablet Take 1 tablet by mouth daily 5/3/22  Yes Philly Coats MD   Metoprolol Tartrate 75 MG TABS Take 75 mg by mouth 2 times daily 5/3/22  Yes Philly Coats MD   Multiple Vitamins-Minerals (CENTRUM SILVER 50+WOMEN) TABS Take by mouth   Yes Historical Provider, MD   sodium polystyrene (KAYEXALATE) 15 GM/60ML suspension 30 ml daily for 2 days  Patient not taking: Reported on 8/3/2022 7/28/22   Philly Coats MD     Social History     Socioeconomic History    Marital status:     Tobacco Use    Smoking status: Never    Smokeless tobacco: Never   Vaping Use    Vaping Use: Never used   Substance and Sexual Activity    Alcohol use: Yes     Comment: Socially    Drug use: Never    Sexual activity: Not Currently     Partners: Male     Social Determinants of Health     Financial Resource Strain: Low Risk     Difficulty of Paying Living Expenses: Not hard at all   Food Insecurity: No Food Insecurity    Worried About [address] [employer] in the Last Year: Never true    [address] in the Last Year: Never true   Transportation Needs: No Transportation Needs    Lack of Transportation (Medical): No    Lack of Transportation (Non-Medical): No   Physical Activity: Sufficiently Active    Days of Exercise per Week: 7 days    Minutes of Exercise per Session: 60 min   Housing Stability: Low Risk     Unable to Pay for Housing in the Last Year: No    Number of Places Lived in the Last Year: 1    Unstable Housing in the Last Year: No       I have reviewed Natasha's allergies, medications, problem list, medical, social and family history and have updated as needed in the electronic medical record  Review Of Systems:    Skin: no abnormal pigmentation, rash, scaling, itching, masses, hair or nail changes  Eyes: no blurring, diplopia, or eye pain  Ears/Nose/Throat: no hearing loss, tinnitus, vertigo, nosebleed, nasal congestion, rhinorrhea, sore throat  Respiratory: no cough, pleuritic chest pain, dyspnea, or wheezing  Cardiovascular: no chest pain, angina, dyspnea on exertion, orthopnea, PND, palpitations, or claudication  Gastrointestinal: no nausea, vomiting, heartburn, diarrhea, constipation, bloating,  abdominal pain, or blood per rectum. Appetite is good  Genitourinary: no urinary urgency, frequency, dysuria, nocturia, hesitancy, or incontinence  Musculoskeletal: joint pains off and on. Morning stiffness.  Ambulating well  Neurologic: no paralysis, paresis, paresthesia, seizures, tremors, or headaches  Hematologic/Lymphatic/Immunologic: no anemia, abnormal bleeding/bruising, fever, chills, night sweats, swollen glands, or unexplained weight loss  Endocrine: no heat or cold intolerance and no polyphagia, polydipsia, or polyuria        OBJECTIVE:     VS:  Wt Readings from Last 3 Encounters:   08/03/22 131 lb (59.4 kg)   05/03/22 136 lb (61.7 kg)   03/03/22 136 lb (61.7 kg)     Temp Readings from Last 3 Encounters:   08/03/22 97 °F (36.1 °C)   05/03/22 96.4 °F (35.8 °C)   03/03/22 97 °F (36.1 °C)     BP Readings from Last 3 Encounters:   08/03/22 120/80   05/03/22 122/80   03/03/22 132/74        General appearance: Alert, Awake, Oriented times 3, no distress  Skin: Warm and dry  Head: Normocephalic. No masses, lesions or tenderness noted  Eyes: Conjunctivae appear normal. PERLE  Ears: External ears normal  Nose/Sinuses: Nares normal. Septum midline. Mucosa normal. No drainage  Oropharynx: Oropharynx clear with no exudate seen  Neck: Neck supple. No jugular venous distension, lymphadenopathy or thyromegaly Trachea midline  Chest:  Normal. Movements are Normal and Equal.  Lungs: Lungs clear to auscultation bilaterally. No ronchi, crackles or wheezes  Heart: S1 S2  Regular rate and rhythm. No rub, murmur or gallop  Abdomen: Abdomen soft, non-tender. BS normal. No masses, organomegaly. Back: Grossly Normal and Equal. DTR are Normal. SLR is Normal.  Extremities: Arthritic changes are noted. Movements are limited. Pedal pulses are normal.  Musculoskeletal: Muscular strength appears intact. No joint effusion, tenderness, swelling or warmth  Neuro:  No focal motor or sensory deficits        ASSESSMENT     Patient Active Problem List    Diagnosis Date Noted    CKD (chronic kidney disease) stage 4, GFR 15-29 ml/min ([facility]) 08/03/2022    Normocytic normochromic anemia 03/03/2022    Ulcer of abdomen wall with necrosis of muscle ([facility] 10/12/2021    Essential hypertension     Sepsis due to cellulitis ([facility] 09/22/2021        Diagnosis:     ICD-10-CM    1. Essential hypertension  I10     CONTROLLED      2. CKD (chronic kidney disease) stage 4, GFR 15-29 ml/min ([facility])  N18.4 CBC with Auto Differential    PORGRESSIVE       3. Normocytic normochromic anemia  D64.9 CBC with Auto Differential    IMPROVING      4. Hyperkalemia  E87.5     ? CONTROL      5.  Mixed hypercholesterolemia and hypertriglyceridemia  E78.2 Comprehensive Metabolic Panel     Lipid Panel    DIET CONTROLLED          PLAN:           Patient Instructions   LOW SALT FOR BLOOD PRESSURE CONTROL. LOW FAT DIET FOR CHOLESTEROL CONTROL. TAKE PRINIVIL 40 MG. AND HCT 12.5. MG. DAILY  AND METOPROLOL 75 MG. 2 TIMES A DAY  FOR BLOOD PRESSURE CONTROL. Yolis Guadalupe TAKE FEOSOL 1 TAB. 2 TIMES A DAY TO IMPROVE BLOOD COUNT. REGULAR WALKING ADVISED. FASTING FOR LAB WORK PRIOR TO NEXT VISIT. NEXT APPOINTMENT IN 3 MONTHS. Return in about 3 months (around 11/3/2022) for FOLLOW UP VISIT. I have reviewed my findings and recommendations with Yulia Saelem.     Electronically signed by Vera Curry MD on 8/3/22 at 10:54 AM EDT

## 2022-08-03 NOTE — PATIENT INSTRUCTIONS
LOW SALT FOR BLOOD PRESSURE CONTROL. LOW FAT DIET FOR CHOLESTEROL CONTROL. TAKE PRINIVIL 40 MG. AND HCT 12.5. MG. DAILY  AND METOPROLOL 75 MG. 2 TIMES A DAY  FOR BLOOD PRESSURE CONTROL. Vearl Pippins TAKE FEOSOL 1 TAB. 2 TIMES A DAY TO IMPROVE BLOOD COUNT. REGULAR WALKING ADVISED. FASTING FOR LAB WORK PRIOR TO NEXT VISIT. NEXT APPOINTMENT IN 3 MONTHS.

## 2022-08-05 NOTE — RESULT ENCOUNTER NOTE
KIDNEY FUNCTION  LOW. WILL MONITOR. BLOOD GLUCOSE IS HIGH. LOW SALT, LOW CARB. AND LOW FAT DIET. REGULAR EXERCISE. CONTINUE CURRENT MEDICATIONS. PLEASE ACKNOWLEDGE RECEIPT OF INFORMATION BY REPLYING THE MESSAGE. THANKS.

## 2022-08-31 ENCOUNTER — TELEPHONE (OUTPATIENT)
Dept: FAMILY MEDICINE CLINIC | Age: 72
End: 2022-08-31

## 2022-09-15 RX ORDER — LISINOPRIL 40 MG/1
40 TABLET ORAL DAILY
Qty: 30 TABLET | Refills: 2 | Status: SHIPPED
Start: 2022-09-15 | End: 2022-11-03 | Stop reason: DRUGHIGH

## 2022-09-15 RX ORDER — METOPROLOL TARTRATE 75 MG/1
TABLET, FILM COATED ORAL
Qty: 60 TABLET | Refills: 3 | Status: SHIPPED | OUTPATIENT
Start: 2022-09-15

## 2022-09-15 RX ORDER — HYDROCHLOROTHIAZIDE 12.5 MG/1
12.5 TABLET ORAL DAILY
Qty: 30 TABLET | Refills: 2 | Status: SHIPPED | OUTPATIENT
Start: 2022-09-15

## 2022-10-31 DIAGNOSIS — N18.4 CKD (CHRONIC KIDNEY DISEASE) STAGE 4, GFR 15-29 ML/MIN (HCC): ICD-10-CM

## 2022-10-31 DIAGNOSIS — D64.9 NORMOCYTIC NORMOCHROMIC ANEMIA: ICD-10-CM

## 2022-10-31 DIAGNOSIS — E78.2 MIXED HYPERCHOLESTEROLEMIA AND HYPERTRIGLYCERIDEMIA: ICD-10-CM

## 2022-10-31 LAB
ALBUMIN SERPL-MCNC: 4.2 G/DL (ref 3.5–5.2)
ALP BLD-CCNC: 77 U/L (ref 35–104)
ALT SERPL-CCNC: 8 U/L (ref 0–32)
ANION GAP SERPL CALCULATED.3IONS-SCNC: 17 MMOL/L (ref 7–16)
AST SERPL-CCNC: 25 U/L (ref 0–31)
BASOPHILS ABSOLUTE: 0.04 E9/L (ref 0–0.2)
BASOPHILS RELATIVE PERCENT: 0.8 % (ref 0–2)
BILIRUB SERPL-MCNC: 0.5 MG/DL (ref 0–1.2)
BUN BLDV-MCNC: 36 MG/DL (ref 6–23)
CALCIUM SERPL-MCNC: 10 MG/DL (ref 8.6–10.2)
CHLORIDE BLD-SCNC: 102 MMOL/L (ref 98–107)
CHOLESTEROL, TOTAL: 166 MG/DL (ref 0–199)
CO2: 19 MMOL/L (ref 22–29)
CREAT SERPL-MCNC: 1.8 MG/DL (ref 0.5–1)
EOSINOPHILS ABSOLUTE: 0 E9/L (ref 0.05–0.5)
EOSINOPHILS RELATIVE PERCENT: 0 % (ref 0–6)
GFR SERPL CREATININE-BSD FRML MDRD: 30 ML/MIN/1.73
GLUCOSE BLD-MCNC: 100 MG/DL (ref 74–99)
HCT VFR BLD CALC: 33.8 % (ref 34–48)
HDLC SERPL-MCNC: 34 MG/DL
HEMOGLOBIN: 11.3 G/DL (ref 11.5–15.5)
IMMATURE GRANULOCYTES #: 0.01 E9/L
IMMATURE GRANULOCYTES %: 0.2 % (ref 0–5)
LDL CHOLESTEROL CALCULATED: 105 MG/DL (ref 0–99)
LYMPHOCYTES ABSOLUTE: 1.24 E9/L (ref 1.5–4)
LYMPHOCYTES RELATIVE PERCENT: 24.3 % (ref 20–42)
MCH RBC QN AUTO: 31 PG (ref 26–35)
MCHC RBC AUTO-ENTMCNC: 33.4 % (ref 32–34.5)
MCV RBC AUTO: 92.9 FL (ref 80–99.9)
MONOCYTES ABSOLUTE: 0.41 E9/L (ref 0.1–0.95)
MONOCYTES RELATIVE PERCENT: 8 % (ref 2–12)
NEUTROPHILS ABSOLUTE: 3.4 E9/L (ref 1.8–7.3)
NEUTROPHILS RELATIVE PERCENT: 66.7 % (ref 43–80)
PDW BLD-RTO: 12.3 FL (ref 11.5–15)
PLATELET # BLD: 181 E9/L (ref 130–450)
PMV BLD AUTO: 11 FL (ref 7–12)
POTASSIUM SERPL-SCNC: 5.1 MMOL/L (ref 3.5–5)
RBC # BLD: 3.64 E12/L (ref 3.5–5.5)
SODIUM BLD-SCNC: 138 MMOL/L (ref 132–146)
TOTAL PROTEIN: 6.8 G/DL (ref 6.4–8.3)
TRIGL SERPL-MCNC: 133 MG/DL (ref 0–149)
VLDLC SERPL CALC-MCNC: 27 MG/DL
WBC # BLD: 5.1 E9/L (ref 4.5–11.5)

## 2022-11-02 NOTE — RESULT ENCOUNTER NOTE
LDL CHOLESTEROL  LEVEL ARE HIGH. RECOMMEND:   LOW FAT DIET. REGULAR EXERCISE. CONTINUE CURRENT MEDICATIONS. KIDNEY FUNCTION BORDERLINE LOW. WILL MONITOR. LOW HGB. DISCUSS NEXT VISIT. PLEASE ACKNOWLEDGE RECEIPT OF INFORMATION BY REPLYING THE MESSAGE. THANKS.

## 2022-11-03 ENCOUNTER — OFFICE VISIT (OUTPATIENT)
Dept: FAMILY MEDICINE CLINIC | Age: 72
End: 2022-11-03
Payer: MEDICARE

## 2022-11-03 VITALS
BODY MASS INDEX: 19.46 KG/M2 | OXYGEN SATURATION: 100 % | SYSTOLIC BLOOD PRESSURE: 138 MMHG | DIASTOLIC BLOOD PRESSURE: 80 MMHG | HEART RATE: 60 BPM | WEIGHT: 128 LBS

## 2022-11-03 DIAGNOSIS — L98.493 ULCER OF ABDOMEN WALL WITH NECROSIS OF MUSCLE (HCC): ICD-10-CM

## 2022-11-03 DIAGNOSIS — I10 ESSENTIAL HYPERTENSION: ICD-10-CM

## 2022-11-03 DIAGNOSIS — N18.4 CKD (CHRONIC KIDNEY DISEASE) STAGE 4, GFR 15-29 ML/MIN (HCC): ICD-10-CM

## 2022-11-03 DIAGNOSIS — D64.9 NORMOCYTIC NORMOCHROMIC ANEMIA: ICD-10-CM

## 2022-11-03 DIAGNOSIS — E78.2 MIXED HYPERCHOLESTEROLEMIA AND HYPERTRIGLYCERIDEMIA: Primary | ICD-10-CM

## 2022-11-03 PROCEDURE — 1124F ACP DISCUSS-NO DSCNMKR DOCD: CPT | Performed by: FAMILY MEDICINE

## 2022-11-03 PROCEDURE — 99214 OFFICE O/P EST MOD 30 MIN: CPT | Performed by: FAMILY MEDICINE

## 2022-11-03 PROCEDURE — 3078F DIAST BP <80 MM HG: CPT | Performed by: FAMILY MEDICINE

## 2022-11-03 PROCEDURE — 3074F SYST BP LT 130 MM HG: CPT | Performed by: FAMILY MEDICINE

## 2022-11-03 RX ORDER — LISINOPRIL 20 MG/1
TABLET ORAL
COMMUNITY
Start: 2022-10-28

## 2022-11-03 NOTE — PROGRESS NOTES
OFFICE PROGRESS NOTE      SUBJECTIVE:        Patient ID:   Vicki Billy is a 81 y.o. female who presents for   Chief Complaint   Patient presents with    Hypertension           HPI:     RECHECK ANEMIA, BP, CHOLESTEROL AND KIDNEY FUNCTION. SEEING NEPHROLOGIST FOR LOW KIDNEY FUNCTION. MEDICATION REFILL. FEELS GOOD. WATCHING DIET GOOD. WALKING FOR EXERCISE. TAKING MEDICATIONS REGULARLY. Prior to Admission medications    Medication Sig Start Date End Date Taking? Authorizing Provider   lisinopril (PRINIVIL;ZESTRIL) 20 MG tablet  10/28/22  Yes Historical Provider, MD   hydroCHLOROthiazide (HYDRODIURIL) 12.5 MG tablet TAKE 1 TABLET BY MOUTH DAILY 9/15/22  Yes Latoya Goins MD   Metoprolol Tartrate 75 MG TABS TAKE 1 TABLET BY MOUTH TWICE DAILY 9/15/22  Yes Latoya Goins MD   ferrous sulfate (IRON 325) 325 (65 Fe) MG tablet Take 1 tablet by mouth in the morning and 1 tablet before bedtime. 8/3/22  Yes Latoya Goins MD   Multiple Vitamins-Minerals (CENTRUM SILVER 50+WOMEN) TABS Take by mouth   Yes Historical Provider, MD     Social History     Socioeconomic History    Marital status:       Spouse name: None    Number of children: None    Years of education: None    Highest education level: None   Tobacco Use    Smoking status: Never    Smokeless tobacco: Never   Vaping Use    Vaping Use: Never used   Substance and Sexual Activity    Alcohol use: Yes     Comment: Socially    Drug use: Never    Sexual activity: Not Currently     Partners: Male     Social Determinants of Health     Financial Resource Strain: Low Risk     Difficulty of Paying Living Expenses: Not hard at all   Food Insecurity: No Food Insecurity    Worried About [address] in the Last Year: Never true    [email or web address] in the Last Year: Never true   Transportation Needs: No Transportation Needs    Lack of Transportation (Medical): No    Lack of Transportation (Non-Medical): No   Physical Activity: Sufficiently Active    Days of Exercise per Week: 7 days    Minutes of Exercise per Session: 60 min   Housing Stability: Low Risk     Unable to Pay for Housing in the Last Year: No    Number of Places Lived in the Last Year: 1    Unstable Housing in the Last Year: No       I have reviewed Armandos allergies, medications, problem list, medical, social and family history and have updated as needed in the electronic medical record  Review Of Systems:    Skin: no abnormal pigmentation, rash, scaling, itching, masses, hair or nail changes  Eyes: no blurring, diplopia, or eye pain  Ears/Nose/Throat: no hearing loss, tinnitus, vertigo, nosebleed, nasal congestion, rhinorrhea, sore throat  Respiratory: no cough, pleuritic chest pain, dyspnea, or wheezing  Cardiovascular: no chest pain, angina, dyspnea on exertion, orthopnea, PND, palpitations, or claudication  Gastrointestinal: no nausea, vomiting, heartburn, diarrhea, constipation, bloating,  abdominal pain, or blood per rectum. Appetite is good  Genitourinary: no urinary urgency, frequency, dysuria, nocturia, hesitancy, or incontinence  Musculoskeletal: joint pains off and on. Morning stiffness.  Ambulating well  Neurologic: no paralysis, paresis, paresthesia, seizures, tremors, or headaches  Hematologic/Lymphatic/Immunologic: no anemia, abnormal bleeding/bruising, fever, chills, night sweats, swollen glands, or unexplained weight loss  Endocrine: no heat or cold intolerance and no polyphagia, polydipsia, or polyuria        OBJECTIVE:     VS:  Wt Readings from Last 3 Encounters:   11/03/22 128 lb (58.1 kg)   08/03/22 131 lb (59.4 kg)   05/03/22 136 lb (61.7 kg)     Temp Readings from Last 3 Encounters:   08/03/22 97 °F (36.1 °C)   05/03/22 96.4 °F (35.8 °C)   03/03/22 97 °F (36.1 °C)     BP Readings from Last 3 Encounters:   11/03/22 138/80   08/03/22 120/80   05/03/22 122/80        General appearance: Alert, Awake, Oriented times 3, no distress  Skin: Warm and dry  Head: Normocephalic. No masses, lesions or tenderness noted  Eyes: Conjunctivae appear normal. PERLE  Ears: External ears normal  Nose/Sinuses: Nares normal. Septum midline. Mucosa normal. No drainage  Oropharynx: Oropharynx clear with no exudate seen  Neck: Neck supple. No jugular venous distension, lymphadenopathy or thyromegaly Trachea midline  Chest:  Normal. Movements are Normal and Equal.  Lungs: Lungs clear to auscultation bilaterally. No ronchi, crackles or wheezes  Heart: S1 S2  Regular rate and rhythm. No rub, murmur or gallop  Abdomen: Abdomen soft, non-tender. BS normal. No masses, organomegaly. Back: Grossly Normal and Equal. DTR are Normal. SLR is Normal.  Extremities: Arthritic changes are noted. Movements are limited. Pedal pulses are normal.  Musculoskeletal: Muscular strength appears intact. No joint effusion, tenderness, swelling or warmth  Neuro:  No focal motor or sensory deficits        ASSESSMENT     Patient Active Problem List    Diagnosis Date Noted    CKD (chronic kidney disease) stage 4, GFR 15-29 ml/min ([facility]) 08/03/2022    Normocytic normochromic anemia 03/03/2022    Ulcer of abdomen wall with necrosis of muscle ([facility] 10/12/2021    Essential hypertension     Sepsis due to cellulitis ([facility] [address] 09/22/2021        Diagnosis:     ICD-10-CM    1. Mixed hypercholesterolemia and hypertriglyceridemia  E78.2 Comprehensive Metabolic Panel     Lipid Panel    DIET CONTROLLED      2. Essential hypertension  I10     CONTROLLED      3. Ulcer of abdomen wall with necrosis of muscle ([facility])  L98.493     HEALED      4. CKD (chronic kidney disease) stage 4, GFR 15-29 ml/min ([facility])  N18.4     STABLE      5. Normocytic normochromic anemia  D64.9 CBC with Auto Differential    IMPROVING          PLAN:           Patient Instructions   LOW SALT FOR BLOOD PRESSURE CONTROL. LOW FAT DIET FOR CHOLESTEROL CONTROL. TAKE PRINIVIL 40 MG. AND HCT 12.5.  MG. DAILY  AND METOPROLOL 75 MG. 2 TIMES A DAY  FOR BLOOD PRESSURE CONTROL. Isidoro Bates TAKE FEOSOL 1 TAB. 2 TIMES A DAY TO IMPROVE BLOOD COUNT. REGULAR WALKING ADVISED. FASTING FOR LAB WORK PRIOR TO NEXT VISIT. NEXT APPOINTMENT IN 3 MONTHS. Return in about 3 months (around 2/3/2023) for FOLLOW UP VISIT. I have reviewed my findings and recommendations with Abdias San.     Electronically signed by Itzel Farfan MD on 11/3/22 at 10:03 AM EDT

## 2022-11-03 NOTE — PATIENT INSTRUCTIONS
LOW SALT FOR BLOOD PRESSURE CONTROL. LOW FAT DIET FOR CHOLESTEROL CONTROL. TAKE PRINIVIL 40 MG. AND HCT 12.5. MG. DAILY  AND METOPROLOL 75 MG. 2 TIMES A DAY  FOR BLOOD PRESSURE CONTROL. Vallecillo Hidden TAKE FEOSOL 1 TAB. 2 TIMES A DAY TO IMPROVE BLOOD COUNT. REGULAR WALKING ADVISED. FASTING FOR LAB WORK PRIOR TO NEXT VISIT. NEXT APPOINTMENT IN 3 MONTHS.

## 2022-12-20 RX ORDER — HYDROCHLOROTHIAZIDE 12.5 MG/1
12.5 TABLET ORAL DAILY
Qty: 30 TABLET | Refills: 2 | Status: SHIPPED | OUTPATIENT
Start: 2022-12-20

## 2023-01-24 RX ORDER — METOPROLOL TARTRATE 75 MG/1
TABLET, FILM COATED ORAL
Qty: 60 TABLET | Refills: 3 | Status: SHIPPED | OUTPATIENT
Start: 2023-01-24

## 2023-01-31 DIAGNOSIS — D64.9 NORMOCYTIC NORMOCHROMIC ANEMIA: ICD-10-CM

## 2023-01-31 DIAGNOSIS — E78.2 MIXED HYPERCHOLESTEROLEMIA AND HYPERTRIGLYCERIDEMIA: ICD-10-CM

## 2023-01-31 LAB
ALBUMIN SERPL-MCNC: 4.2 G/DL (ref 3.5–5.2)
ALP BLD-CCNC: 83 U/L (ref 35–104)
ALT SERPL-CCNC: 6 U/L (ref 0–32)
ANION GAP SERPL CALCULATED.3IONS-SCNC: 9 MMOL/L (ref 7–16)
AST SERPL-CCNC: 32 U/L (ref 0–31)
BASOPHILS ABSOLUTE: 0.04 E9/L (ref 0–0.2)
BASOPHILS RELATIVE PERCENT: 0.6 % (ref 0–2)
BILIRUB SERPL-MCNC: 0.4 MG/DL (ref 0–1.2)
BUN BLDV-MCNC: 35 MG/DL (ref 6–23)
CALCIUM SERPL-MCNC: 9.5 MG/DL (ref 8.6–10.2)
CHLORIDE BLD-SCNC: 105 MMOL/L (ref 98–107)
CHOLESTEROL, TOTAL: 183 MG/DL (ref 0–199)
CO2: 24 MMOL/L (ref 22–29)
CREAT SERPL-MCNC: 1.6 MG/DL (ref 0.5–1)
EOSINOPHILS ABSOLUTE: 0 E9/L (ref 0.05–0.5)
EOSINOPHILS RELATIVE PERCENT: 0 % (ref 0–6)
GFR SERPL CREATININE-BSD FRML MDRD: 34 ML/MIN/1.73
GLUCOSE BLD-MCNC: 95 MG/DL (ref 74–99)
HCT VFR BLD CALC: 33.7 % (ref 34–48)
HDLC SERPL-MCNC: 34 MG/DL
HEMOGLOBIN: 11 G/DL (ref 11.5–15.5)
IMMATURE GRANULOCYTES #: 0.02 E9/L
IMMATURE GRANULOCYTES %: 0.3 % (ref 0–5)
LDL CHOLESTEROL CALCULATED: 127 MG/DL (ref 0–99)
LYMPHOCYTES ABSOLUTE: 1.14 E9/L (ref 1.5–4)
LYMPHOCYTES RELATIVE PERCENT: 18.1 % (ref 20–42)
MCH RBC QN AUTO: 29.5 PG (ref 26–35)
MCHC RBC AUTO-ENTMCNC: 32.6 % (ref 32–34.5)
MCV RBC AUTO: 90.3 FL (ref 80–99.9)
MONOCYTES ABSOLUTE: 0.49 E9/L (ref 0.1–0.95)
MONOCYTES RELATIVE PERCENT: 7.8 % (ref 2–12)
NEUTROPHILS ABSOLUTE: 4.61 E9/L (ref 1.8–7.3)
NEUTROPHILS RELATIVE PERCENT: 73.2 % (ref 43–80)
PDW BLD-RTO: 12.7 FL (ref 11.5–15)
PLATELET # BLD: 179 E9/L (ref 130–450)
PMV BLD AUTO: 10.5 FL (ref 7–12)
POTASSIUM SERPL-SCNC: 5.5 MMOL/L (ref 3.5–5)
RBC # BLD: 3.73 E12/L (ref 3.5–5.5)
SODIUM BLD-SCNC: 138 MMOL/L (ref 132–146)
TOTAL PROTEIN: 6.7 G/DL (ref 6.4–8.3)
TRIGL SERPL-MCNC: 108 MG/DL (ref 0–149)
VLDLC SERPL CALC-MCNC: 22 MG/DL
WBC # BLD: 6.3 E9/L (ref 4.5–11.5)

## 2023-01-31 NOTE — RESULT ENCOUNTER NOTE
KIDNEY FUNCTION BORDERLINE LOW. WILL MONITOR. LDL CHOLESTEROL  LEVEL ARE HIGH. RECOMMEND:  LOW SALT, LOW CARB. AND LOW FAT DIET. REGULAR EXERCISE. CONTINUE CURRENT MEDICATIONS. HGB STILL LOW. DISCUSS NEXT VISIT. PLEASE ACKNOWLEDGE RECEIPT OF INFORMATION BY REPLYING THE MESSAGE. THANKS.

## 2023-02-03 ENCOUNTER — OFFICE VISIT (OUTPATIENT)
Dept: FAMILY MEDICINE CLINIC | Age: 73
End: 2023-02-03

## 2023-02-03 VITALS
HEART RATE: 73 BPM | SYSTOLIC BLOOD PRESSURE: 122 MMHG | DIASTOLIC BLOOD PRESSURE: 80 MMHG | BODY MASS INDEX: 19.46 KG/M2 | WEIGHT: 128 LBS | OXYGEN SATURATION: 99 %

## 2023-02-03 DIAGNOSIS — N18.32 STAGE 3B CHRONIC KIDNEY DISEASE (HCC): ICD-10-CM

## 2023-02-03 DIAGNOSIS — D64.9 NORMOCYTIC NORMOCHROMIC ANEMIA: ICD-10-CM

## 2023-02-03 DIAGNOSIS — E78.2 MIXED HYPERCHOLESTEROLEMIA AND HYPERTRIGLYCERIDEMIA: Primary | ICD-10-CM

## 2023-02-03 DIAGNOSIS — E55.9 HYPOVITAMINOSIS D: ICD-10-CM

## 2023-02-03 DIAGNOSIS — L98.493 ULCER OF ABDOMEN WALL WITH NECROSIS OF MUSCLE (HCC): ICD-10-CM

## 2023-02-03 DIAGNOSIS — I10 ESSENTIAL HYPERTENSION: ICD-10-CM

## 2023-02-03 RX ORDER — LISINOPRIL 10 MG/1
10 TABLET ORAL
Qty: 90 TABLET | Refills: 1 | Status: SHIPPED | COMMUNITY
Start: 2023-02-03

## 2023-02-03 RX ORDER — HYDROCHLOROTHIAZIDE 12.5 MG/1
12.5 TABLET ORAL DAILY
Qty: 90 TABLET | Refills: 1 | Status: SHIPPED | OUTPATIENT
Start: 2023-02-03

## 2023-02-03 RX ORDER — FERROUS SULFATE 325(65) MG
325 TABLET ORAL 2 TIMES DAILY
Qty: 180 TABLET | Refills: 1 | Status: SHIPPED | OUTPATIENT
Start: 2023-02-03

## 2023-02-03 SDOH — ECONOMIC STABILITY: FOOD INSECURITY: WITHIN THE PAST 12 MONTHS, YOU WORRIED THAT YOUR FOOD WOULD RUN OUT BEFORE YOU GOT MONEY TO BUY MORE.: NEVER TRUE

## 2023-02-03 SDOH — ECONOMIC STABILITY: FOOD INSECURITY: WITHIN THE PAST 12 MONTHS, THE FOOD YOU BOUGHT JUST DIDN'T LAST AND YOU DIDN'T HAVE MONEY TO GET MORE.: NEVER TRUE

## 2023-02-03 SDOH — ECONOMIC STABILITY: INCOME INSECURITY: HOW HARD IS IT FOR YOU TO PAY FOR THE VERY BASICS LIKE FOOD, HOUSING, MEDICAL CARE, AND HEATING?: NOT HARD AT ALL

## 2023-02-03 ASSESSMENT — PATIENT HEALTH QUESTIONNAIRE - PHQ9
1. LITTLE INTEREST OR PLEASURE IN DOING THINGS: 0
SUM OF ALL RESPONSES TO PHQ QUESTIONS 1-9: 0
2. FEELING DOWN, DEPRESSED OR HOPELESS: 0
SUM OF ALL RESPONSES TO PHQ9 QUESTIONS 1 & 2: 0
SUM OF ALL RESPONSES TO PHQ QUESTIONS 1-9: 0

## 2023-02-03 NOTE — PATIENT INSTRUCTIONS
LOW SALT FOR BLOOD PRESSURE CONTROL. LOW FAT DIET FOR CHOLESTEROL CONTROL. TAKE PRINIVIL 40 MG. AND HCT 12.5. MG. DAILY  AND METOPROLOL 75 MG. 2 TIMES A DAY  FOR BLOOD PRESSURE CONTROL. Moraima Be TAKE FEOSOL 1 TAB. 2 TIMES A DAY TO IMPROVE BLOOD COUNT. REGULAR WALKING ADVISED. FASTING FOR LAB WORK PRIOR TO NEXT VISIT. NEXT APPOINTMENT IN 3 MONTHS.

## 2023-02-03 NOTE — PROGRESS NOTES
OFFICE PROGRESS NOTE      SUBJECTIVE:        Patient ID:   Sindhu Hale is a 81 y.o. female who presents for   Chief Complaint   Patient presents with    Hypertension           HPI:     RECHECK BP, CHOLESTEROL AND ANEMIA. MISSED TAKING IRON TABLETS OVER HOLIDAY SEASON. WOUND ON THE ABDOMEN HEALED > 1 YEAR AGO NOW. MEDICATION REFILL. FEELS GOOD. WATCHING DIET GOOD. WALKING FOR EXERCISE. TAKING MEDICATIONS REGULARLY. Prior to Admission medications    Medication Sig Start Date End Date Taking? Authorizing Provider   hydroCHLOROthiazide (HYDRODIURIL) 12.5 MG tablet Take 1 tablet by mouth daily 2/3/23  Yes Vivi Davis MD   ferrous sulfate (IRON 325) 325 (65 Fe) MG tablet Take 1 tablet by mouth 2 times daily 2/3/23  Yes Vivi Davis MD   lisinopril (PRINIVIL;ZESTRIL) 10 MG tablet 1 tablet 2/3/23  Yes Vivi Davis MD   Metoprolol Tartrate 75 MG TABS TAKE 1 TABLET BY MOUTH TWICE DAILY 1/24/23  Yes Vivi Davis MD   Multiple Vitamins-Minerals (CENTRUM SILVER 50+WOMEN) TABS Take by mouth   Yes Historical Provider, MD     Social History     Socioeconomic History    Marital status:    Tobacco Use    Smoking status: Never    Smokeless tobacco: Never   Vaping Use    Vaping Use: Never used   Substance and Sexual Activity    Alcohol use: Yes     Comment: Socially    Drug use: Never    Sexual activity: Not Currently     Partners: Male     Social Determinants of Health     Financial Resource Strain: Low Risk     Difficulty of Paying Living Expenses: Not hard at all   Food Insecurity: No Food Insecurity    Worried About Running Out of Food in the Last Year: Never true    Ran Out of Food in the Last Year: Never true   Transportation Needs: Unknown    Lack of Transportation (Non-Medical):  No   Physical Activity: Sufficiently Active    Days of Exercise per Week: 7 days    Minutes of Exercise per Session: 60 min   Housing Stability: Unknown    Unstable Housing in the Last Year: No       I have reviewed Armandos allergies, medications, problem list, medical, social and family history and have updated as needed in the electronic medical record  Review Of Systems:    Skin: no abnormal pigmentation, rash, scaling, itching, masses, hair or nail changes  Eyes: no blurring, diplopia, or eye pain  Ears/Nose/Throat: no hearing loss, tinnitus, vertigo, nosebleed, nasal congestion, rhinorrhea, sore throat  Respiratory: no cough, pleuritic chest pain, dyspnea, or wheezing  Cardiovascular: no chest pain, angina, dyspnea on exertion, orthopnea, PND, palpitations, or claudication  Gastrointestinal: no nausea, vomiting, heartburn, diarrhea, constipation, bloating,  abdominal pain, or blood per rectum. Appetite is good  Genitourinary: no urinary urgency, frequency, dysuria, nocturia, hesitancy, or incontinence  Musculoskeletal: joint pains off and on. Morning stiffness. Ambulating well  Neurologic: no paralysis, paresis, paresthesia, seizures, tremors, or headaches  Hematologic/Lymphatic/Immunologic: no anemia, abnormal bleeding/bruising, fever, chills, night sweats, swollen glands, or unexplained weight loss  Endocrine: no heat or cold intolerance and no polyphagia, polydipsia, or polyuria        OBJECTIVE:     VS:  Wt Readings from Last 3 Encounters:   02/03/23 128 lb (58.1 kg)   11/03/22 128 lb (58.1 kg)   08/03/22 131 lb (59.4 kg)     Temp Readings from Last 3 Encounters:   08/03/22 97 °F (36.1 °C)   05/03/22 96.4 °F (35.8 °C)   03/03/22 97 °F (36.1 °C)     BP Readings from Last 3 Encounters:   02/03/23 122/80   11/03/22 138/80   08/03/22 120/80        General appearance: Alert, Awake, Oriented times 3, no distress  Skin: Warm and dry  Head: Normocephalic. No masses, lesions or tenderness noted  Eyes: Conjunctivae appear normal. PERLE  Ears: External ears normal  Nose/Sinuses: Nares normal. Septum midline.  Mucosa normal. No drainage  Oropharynx: Oropharynx clear with no exudate seen  Neck: Neck supple. No jugular venous distension, lymphadenopathy or thyromegaly Trachea midline  Chest:  Normal. Movements are Normal and Equal.  Lungs: Lungs clear to auscultation bilaterally. No ronchi, crackles or wheezes  Heart: S1 S2  Regular rate and rhythm. No rub, murmur or gallop  Abdomen: Abdomen soft, non-tender. BS normal. No masses, organomegaly. Back: Grossly Normal and Equal. DTR are Normal. SLR is Normal.  Extremities: Arthritic changes are noted. Movements are limited. Pedal pulses are normal.  Musculoskeletal: Muscular strength appears intact. No joint effusion, tenderness, swelling or warmth  Neuro:  No focal motor or sensory deficits        ASSESSMENT     Patient Active Problem List    Diagnosis Date Noted    CKD (chronic kidney disease) stage 4, GFR 15-29 ml/min ([facility]) 08/03/2022    Normocytic normochromic anemia 03/03/2022    Ulcer of abdomen wall with necrosis of muscle ([facility] [address] 10/12/2021    Essential hypertension     Sepsis due to cellulitis ([facility] [address] 09/22/2021        Diagnosis:     ICD-10-CM    1. Mixed hypercholesterolemia and hypertriglyceridemia  E78.2 Comprehensive Metabolic Panel     Lipid Panel    DIET CONTROLLED - FAIR      2. Ulcer of abdomen wall with necrosis of muscle ([facility])  L98.493     HEALED      3. Stage 3b chronic kidney disease ([facility])  N18.32     STABLE      4. Essential hypertension  I10     CONTROLLED      5. Normocytic normochromic anemia  D64.9 CBC with Auto Differential    STABLE      6. Hypovitaminosis D  E55.9 Vitamin D 25 Hydroxy          PLAN:           Patient Instructions   LOW SALT FOR BLOOD PRESSURE CONTROL. LOW FAT DIET FOR CHOLESTEROL CONTROL. TAKE PRINIVIL 40 MG. AND HCT 12.5. MG. DAILY  AND METOPROLOL 75 MG. 2 TIMES A DAY  FOR BLOOD PRESSURE CONTROL. Lucyann Push TAKE FEOSOL 1 TAB. 2 TIMES A DAY TO IMPROVE BLOOD COUNT. REGULAR WALKING ADVISED. FASTING FOR LAB WORK PRIOR TO NEXT VISIT. NEXT APPOINTMENT IN 3 MONTHS.     Return in about 3 months (around 5/3/2023) for FOLLOW UP VISIT. I have reviewed my findings and recommendations with Zita Lafleur.     Electronically signed by Iker Lopez MD on 2/3/23 at 9:33 AM EST

## 2023-02-20 ENCOUNTER — TELEPHONE (OUTPATIENT)
Dept: FAMILY MEDICINE CLINIC | Age: 73
End: 2023-02-20

## 2023-03-13 RX ORDER — METOPROLOL TARTRATE 75 MG/1
TABLET, FILM COATED ORAL
Qty: 180 TABLET | OUTPATIENT
Start: 2023-03-13

## 2023-03-17 RX ORDER — LISINOPRIL 10 MG/1
10 TABLET ORAL DAILY
Qty: 90 TABLET | Refills: 1 | Status: SHIPPED | OUTPATIENT
Start: 2023-03-17

## 2023-04-05 ENCOUNTER — TELEPHONE (OUTPATIENT)
Dept: FAMILY MEDICINE CLINIC | Age: 73
End: 2023-04-05

## 2023-05-03 DIAGNOSIS — E78.2 MIXED HYPERCHOLESTEROLEMIA AND HYPERTRIGLYCERIDEMIA: ICD-10-CM

## 2023-05-03 DIAGNOSIS — E55.9 HYPOVITAMINOSIS D: ICD-10-CM

## 2023-05-03 DIAGNOSIS — D64.9 NORMOCYTIC NORMOCHROMIC ANEMIA: ICD-10-CM

## 2023-05-03 LAB
ALBUMIN SERPL-MCNC: 4.1 G/DL (ref 3.5–5.2)
ALP SERPL-CCNC: 74 U/L (ref 35–104)
ALT SERPL-CCNC: 9 U/L (ref 0–32)
ANION GAP SERPL CALCULATED.3IONS-SCNC: 13 MMOL/L (ref 7–16)
AST SERPL-CCNC: 30 U/L (ref 0–31)
BASOPHILS # BLD: 0.06 E9/L (ref 0–0.2)
BASOPHILS NFR BLD: 0.9 % (ref 0–2)
BILIRUB SERPL-MCNC: 0.4 MG/DL (ref 0–1.2)
BUN SERPL-MCNC: 19 MG/DL (ref 6–23)
CALCIUM SERPL-MCNC: 10.4 MG/DL (ref 8.6–10.2)
CHLORIDE SERPL-SCNC: 107 MMOL/L (ref 98–107)
CHOLESTEROL, TOTAL: 188 MG/DL (ref 0–199)
CO2 SERPL-SCNC: 21 MMOL/L (ref 22–29)
CREAT SERPL-MCNC: 1.4 MG/DL (ref 0.5–1)
EOSINOPHIL # BLD: 0 E9/L (ref 0.05–0.5)
EOSINOPHIL NFR BLD: 0 % (ref 0–6)
ERYTHROCYTE [DISTWIDTH] IN BLOOD BY AUTOMATED COUNT: 12.6 FL (ref 11.5–15)
GLUCOSE SERPL-MCNC: 95 MG/DL (ref 74–99)
HCT VFR BLD AUTO: 34.5 % (ref 34–48)
HDLC SERPL-MCNC: 37 MG/DL
HGB BLD-MCNC: 11.2 G/DL (ref 11.5–15.5)
IMM GRANULOCYTES # BLD: 0.03 E9/L
IMM GRANULOCYTES NFR BLD: 0.4 % (ref 0–5)
LDLC SERPL CALC-MCNC: 125 MG/DL (ref 0–99)
LYMPHOCYTES # BLD: 1.46 E9/L (ref 1.5–4)
LYMPHOCYTES NFR BLD: 21.9 % (ref 20–42)
MCH RBC QN AUTO: 30.7 PG (ref 26–35)
MCHC RBC AUTO-ENTMCNC: 32.5 % (ref 32–34.5)
MCV RBC AUTO: 94.5 FL (ref 80–99.9)
MONOCYTES # BLD: 0.48 E9/L (ref 0.1–0.95)
MONOCYTES NFR BLD: 7.2 % (ref 2–12)
NEUTROPHILS # BLD: 4.65 E9/L (ref 1.8–7.3)
NEUTS SEG NFR BLD: 69.6 % (ref 43–80)
PLATELET # BLD AUTO: 198 E9/L (ref 130–450)
PMV BLD AUTO: 10.7 FL (ref 7–12)
POTASSIUM SERPL-SCNC: 4.6 MMOL/L (ref 3.5–5)
PROT SERPL-MCNC: 6.6 G/DL (ref 6.4–8.3)
RBC # BLD AUTO: 3.65 E12/L (ref 3.5–5.5)
SODIUM SERPL-SCNC: 141 MMOL/L (ref 132–146)
TRIGL SERPL-MCNC: 132 MG/DL (ref 0–149)
VITAMIN D 25-HYDROXY: 38 NG/ML (ref 30–100)
VLDLC SERPL CALC-MCNC: 26 MG/DL
WBC # BLD: 6.7 E9/L (ref 4.5–11.5)

## 2023-05-05 ENCOUNTER — OFFICE VISIT (OUTPATIENT)
Dept: FAMILY MEDICINE CLINIC | Age: 73
End: 2023-05-05
Payer: MEDICARE

## 2023-05-05 VITALS
WEIGHT: 126 LBS | OXYGEN SATURATION: 99 % | HEART RATE: 66 BPM | HEIGHT: 68 IN | SYSTOLIC BLOOD PRESSURE: 136 MMHG | DIASTOLIC BLOOD PRESSURE: 82 MMHG | BODY MASS INDEX: 19.1 KG/M2

## 2023-05-05 DIAGNOSIS — E78.2 MIXED HYPERCHOLESTEROLEMIA AND HYPERTRIGLYCERIDEMIA: Primary | ICD-10-CM

## 2023-05-05 DIAGNOSIS — N18.32 STAGE 3B CHRONIC KIDNEY DISEASE (HCC): ICD-10-CM

## 2023-05-05 DIAGNOSIS — I10 ESSENTIAL HYPERTENSION: ICD-10-CM

## 2023-05-05 DIAGNOSIS — D64.9 NORMOCYTIC NORMOCHROMIC ANEMIA: ICD-10-CM

## 2023-05-05 DIAGNOSIS — E55.9 HYPOVITAMINOSIS D: ICD-10-CM

## 2023-05-05 PROCEDURE — 1124F ACP DISCUSS-NO DSCNMKR DOCD: CPT | Performed by: FAMILY MEDICINE

## 2023-05-05 PROCEDURE — 99214 OFFICE O/P EST MOD 30 MIN: CPT | Performed by: FAMILY MEDICINE

## 2023-05-05 PROCEDURE — 3075F SYST BP GE 130 - 139MM HG: CPT | Performed by: FAMILY MEDICINE

## 2023-05-05 PROCEDURE — 3079F DIAST BP 80-89 MM HG: CPT | Performed by: FAMILY MEDICINE

## 2023-05-05 RX ORDER — METOPROLOL TARTRATE 75 MG/1
1 TABLET, FILM COATED ORAL 2 TIMES DAILY
Qty: 180 TABLET | Refills: 3 | Status: SHIPPED | OUTPATIENT
Start: 2023-05-05

## 2023-05-05 RX ORDER — ACETAMINOPHEN 160 MG
1 TABLET,DISINTEGRATING ORAL DAILY
COMMUNITY

## 2023-05-05 NOTE — PATIENT INSTRUCTIONS
LOW SALT FOR BLOOD PRESSURE CONTROL. LOW FAT DIET FOR CHOLESTEROL CONTROL. TAKE PRINIVIL 40 MG. AND HCT 12.5. MG. DAILY  AND METOPROLOL 75 MG. 2 TIMES A DAY  FOR BLOOD PRESSURE CONTROL. Roland Díaz TAKE VITAMIN D 2000 UNITS DAILY FOR MAINTENANCE. TAKE FEOSOL 1 TAB. 2 TIMES A DAY TO IMPROVE BLOOD COUNT. REGULAR WALKING ADVISED. FASTING FOR LAB WORK PRIOR TO NEXT VISIT. NEXT APPOINTMENT IN 3 MONTHS.

## 2023-05-05 NOTE — PROGRESS NOTES
OFFICE PROGRESS NOTE      SUBJECTIVE:        Patient ID:   aLquita Prieto is a 81 y.o. female who presents for   Chief Complaint   Patient presents with    Hypertension           HPI:     RECHECK BP, CHOLESTEROL AND HYPOVITAMINOSIS D.  MEDICATION REFILL. FEELS GOOD. WATCHING DIET GOOD. WALKING FOR EXERCISE. TAKING MEDICATIONS REGULARLY. Prior to Admission medications    Medication Sig Start Date End Date Taking? Authorizing Provider   Metoprolol Tartrate 75 MG TABS Take 1 tablet by mouth 2 times daily 5/5/23  Yes Mic Henning MD   Cholecalciferol (VITAMIN D3) 50 MCG (2000 UT) CAPS Take 1 capsule by mouth daily   Yes Historical Provider, MD   lisinopril (PRINIVIL;ZESTRIL) 10 MG tablet Take 1 tablet by mouth daily 3/17/23  Yes Mic Henning MD   hydroCHLOROthiazide (HYDRODIURIL) 12.5 MG tablet Take 1 tablet by mouth daily 2/3/23  Yes Mic Henning MD   ferrous sulfate (IRON 325) 325 (65 Fe) MG tablet Take 1 tablet by mouth 2 times daily 2/3/23  Yes Mic Henning MD   Multiple Vitamins-Minerals (CENTRUM SILVER 50+WOMEN) TABS Take by mouth   Yes Historical Provider, MD     Social History     Socioeconomic History    Marital status:     Tobacco Use    Smoking status: Never    Smokeless tobacco: Never   Vaping Use    Vaping Use: Never used   Substance and Sexual Activity    Alcohol use: Yes     Comment: Socially    Drug use: Never    Sexual activity: Not Currently     Partners: Male     Social Determinants of Health     Financial Resource Strain: Low Risk     Difficulty of Paying Living Expenses: Not hard at all   Food Insecurity: No Food Insecurity    Worried About Running Out of Food in the Last Year: Never true    [address] in the Last Year: Never true   Transportation Needs: Unknown    Lack of Transportation (Non-Medical): No   Housing Stability: Unknown    Unstable Housing in the Last Year: No       I have reviewed Natasha's allergies,

## 2023-05-08 ENCOUNTER — TELEPHONE (OUTPATIENT)
Dept: FAMILY MEDICINE CLINIC | Age: 73
End: 2023-05-08

## 2023-07-10 ENCOUNTER — TELEPHONE (OUTPATIENT)
Dept: FAMILY MEDICINE CLINIC | Age: 73
End: 2023-07-10

## 2023-08-08 ENCOUNTER — TELEPHONE (OUTPATIENT)
Dept: FAMILY MEDICINE CLINIC | Age: 73
End: 2023-08-08

## 2023-08-09 DIAGNOSIS — E55.9 HYPOVITAMINOSIS D: ICD-10-CM

## 2023-08-09 DIAGNOSIS — E78.2 MIXED HYPERCHOLESTEROLEMIA AND HYPERTRIGLYCERIDEMIA: ICD-10-CM

## 2023-08-09 DIAGNOSIS — D64.9 NORMOCYTIC NORMOCHROMIC ANEMIA: ICD-10-CM

## 2023-08-09 LAB
ABSOLUTE IMMATURE GRANULOCYTE: <0.03 K/UL (ref 0–0.58)
ALBUMIN SERPL-MCNC: 4.4 G/DL (ref 3.5–5.2)
ALP BLD-CCNC: 67 U/L (ref 35–104)
ALT SERPL-CCNC: 10 U/L (ref 0–32)
ANION GAP SERPL CALCULATED.3IONS-SCNC: 17 MMOL/L (ref 7–16)
AST SERPL-CCNC: 26 U/L (ref 0–31)
BASOPHILS ABSOLUTE: 0.05 K/UL (ref 0–0.2)
BASOPHILS RELATIVE PERCENT: 1 % (ref 0–2)
BILIRUB SERPL-MCNC: 0.4 MG/DL (ref 0–1.2)
BUN BLDV-MCNC: 28 MG/DL (ref 6–23)
CALCIUM SERPL-MCNC: 9.9 MG/DL (ref 8.6–10.2)
CHLORIDE BLD-SCNC: 103 MMOL/L (ref 98–107)
CHOLESTEROL: 194 MG/DL
CO2: 18 MMOL/L (ref 22–29)
CREAT SERPL-MCNC: 1.8 MG/DL (ref 0.5–1)
EOSINOPHILS ABSOLUTE: 0 K/UL (ref 0.05–0.5)
EOSINOPHILS RELATIVE PERCENT: 0 % (ref 0–6)
GFR SERPL CREATININE-BSD FRML MDRD: 29 ML/MIN/1.73M2
GLUCOSE BLD-MCNC: 99 MG/DL (ref 74–99)
HCT VFR BLD CALC: 34.8 % (ref 34–48)
HDLC SERPL-MCNC: 37 MG/DL
HEMOGLOBIN: 10.9 G/DL (ref 11.5–15.5)
IMMATURE GRANULOCYTES: 0 % (ref 0–5)
LDL CHOLESTEROL: 123 MG/DL
LYMPHOCYTES ABSOLUTE: 1.47 K/UL (ref 1.5–4)
LYMPHOCYTES RELATIVE PERCENT: 21 % (ref 20–42)
MCH RBC QN AUTO: 30.5 PG (ref 26–35)
MCHC RBC AUTO-ENTMCNC: 31.3 G/DL (ref 32–34.5)
MCV RBC AUTO: 97.5 FL (ref 80–99.9)
MONOCYTES ABSOLUTE: 0.42 K/UL (ref 0.1–0.95)
MONOCYTES RELATIVE PERCENT: 6 % (ref 2–12)
NEUTROPHILS ABSOLUTE: 5.21 K/UL (ref 1.8–7.3)
NEUTROPHILS RELATIVE PERCENT: 73 % (ref 43–80)
PDW BLD-RTO: 12.8 % (ref 11.5–15)
PLATELET # BLD: 220 K/UL (ref 130–450)
PMV BLD AUTO: 10.1 FL (ref 7–12)
POTASSIUM SERPL-SCNC: 4.9 MMOL/L (ref 3.5–5)
RBC # BLD: 3.57 M/UL (ref 3.5–5.5)
SODIUM BLD-SCNC: 138 MMOL/L (ref 132–146)
TOTAL PROTEIN: 6.9 G/DL (ref 6.4–8.3)
TRIGL SERPL-MCNC: 169 MG/DL
VITAMIN D 25-HYDROXY: 57.1 NG/ML (ref 30–100)
VLDLC SERPL CALC-MCNC: 34 MG/DL
WBC # BLD: 7.2 K/UL (ref 4.5–11.5)

## 2023-08-11 ENCOUNTER — OFFICE VISIT (OUTPATIENT)
Dept: FAMILY MEDICINE CLINIC | Age: 73
End: 2023-08-11
Payer: MEDICARE

## 2023-08-11 VITALS
SYSTOLIC BLOOD PRESSURE: 130 MMHG | WEIGHT: 128 LBS | HEART RATE: 63 BPM | BODY MASS INDEX: 19.46 KG/M2 | OXYGEN SATURATION: 98 % | DIASTOLIC BLOOD PRESSURE: 80 MMHG

## 2023-08-11 DIAGNOSIS — I10 ESSENTIAL HYPERTENSION: ICD-10-CM

## 2023-08-11 DIAGNOSIS — D64.9 NORMOCYTIC NORMOCHROMIC ANEMIA: ICD-10-CM

## 2023-08-11 DIAGNOSIS — N18.4 STAGE 4 CHRONIC KIDNEY DISEASE (HCC): ICD-10-CM

## 2023-08-11 DIAGNOSIS — E78.2 MIXED HYPERCHOLESTEROLEMIA AND HYPERTRIGLYCERIDEMIA: Primary | ICD-10-CM

## 2023-08-11 PROCEDURE — 1124F ACP DISCUSS-NO DSCNMKR DOCD: CPT | Performed by: FAMILY MEDICINE

## 2023-08-11 PROCEDURE — 99214 OFFICE O/P EST MOD 30 MIN: CPT | Performed by: FAMILY MEDICINE

## 2023-08-11 PROCEDURE — 3079F DIAST BP 80-89 MM HG: CPT | Performed by: FAMILY MEDICINE

## 2023-08-11 PROCEDURE — 3075F SYST BP GE 130 - 139MM HG: CPT | Performed by: FAMILY MEDICINE

## 2023-08-11 RX ORDER — FERROUS SULFATE 325(65) MG
TABLET ORAL
Qty: 180 TABLET | Refills: 0 | Status: SHIPPED | OUTPATIENT
Start: 2023-08-11

## 2023-08-11 RX ORDER — LISINOPRIL 10 MG/1
10 TABLET ORAL DAILY
Qty: 90 TABLET | Refills: 1 | Status: SHIPPED | OUTPATIENT
Start: 2023-08-11

## 2023-08-11 RX ORDER — FERROUS SULFATE 325(65) MG
325 TABLET ORAL 2 TIMES DAILY
Qty: 180 TABLET | Refills: 1 | Status: SHIPPED
Start: 2023-08-11 | End: 2023-08-11 | Stop reason: SDUPTHER

## 2023-08-11 RX ORDER — HYDROCHLOROTHIAZIDE 12.5 MG/1
12.5 TABLET ORAL DAILY
Qty: 90 TABLET | Refills: 1 | Status: SHIPPED | OUTPATIENT
Start: 2023-08-11

## 2023-08-11 NOTE — PROGRESS NOTES
OFFICE PROGRESS NOTE      SUBJECTIVE:        Patient ID:   Manuela Rueda is a 72 y.o. female who presents for   Chief Complaint   Patient presents with    Hypertension           HPI:     RECHECK CKD, BP, CHOLESTEROL AND ANEMIA. SEEING NEPHROLOGIST THIS MONTH AGAIN. MEDICATION REFILL. FEELS GOOD. WATCHING DIET GOOD. PHYSICALLY ACTIVE  FOR EXERCISE. TAKING MEDICATIONS REGULARLY. MISSES TAKING IRON TABLETS OFF AND ON. ALSO WILL TRY TAKING MEDICATION NOT WITH MILK FOR BETTER ABSORPTION. Prior to Admission medications    Medication Sig Start Date End Date Taking? Authorizing Provider   lisinopril (PRINIVIL;ZESTRIL) 10 MG tablet Take 1 tablet by mouth daily 8/11/23  Yes Aime Ramos MD   hydroCHLOROthiazide (HYDRODIURIL) 12.5 MG tablet Take 1 tablet by mouth daily 8/11/23  Yes Aime Ramos MD   ferrous sulfate (IRON 325) 325 (65 Fe) MG tablet Take 1 tablet by mouth 2 times daily 8/11/23  Yes Aime Ramos MD   Metoprolol Tartrate 75 MG TABS Take 1 tablet by mouth 2 times daily 5/5/23  Yes Aime Ramos MD   Cholecalciferol (VITAMIN D3) 50 MCG (2000 UT) CAPS Take 1 capsule by mouth daily   Yes Historical Provider, MD   Multiple Vitamins-Minerals (CENTRUM SILVER 50+WOMEN) TABS Take by mouth   Yes Historical Provider, MD     Social History     Socioeconomic History    Marital status:       Spouse name: None    Number of children: None    Years of education: None    Highest education level: None   Tobacco Use    Smoking status: Never    Smokeless tobacco: Never   Vaping Use    Vaping Use: Never used   Substance and Sexual Activity    Alcohol use: Yes     Comment: Socially    Drug use: Never    Sexual activity: Not Currently     Partners: Male     Social Determinants of Health     Financial Resource Strain: Low Risk     Difficulty of Paying Living Expenses: Not hard at all   Food Insecurity: No Food Insecurity    Worried About [place] in the Last

## 2023-08-11 NOTE — PATIENT INSTRUCTIONS
LOW SALT FOR BLOOD PRESSURE CONTROL. LOW FAT DIET FOR CHOLESTEROL CONTROL. TAKE PRINIVIL 40 MG.,  HCT 12.5. MG. DAILY  AND METOPROLOL 75 MG. 2 TIMES A DAY  FOR BLOOD PRESSURE CONTROL. Cathiemissy Venegas TAKE VITAMIN D 2000 UNITS DAILY FOR MAINTENANCE. TAKE FEOSOL 1 TAB. 2 TIMES A DAY TO IMPROVE BLOOD COUNT. REGULAR WALKING ADVISED. FOLLOW UP WITH NEPHROLOGIST AS RECOMMENDED. FASTING FOR LAB WORK PRIOR TO NEXT VISIT. NEXT APPOINTMENT IN 2 MONTHS FOR ANNUAL PHYSICAL EXAMINATION.

## 2023-10-17 ENCOUNTER — OFFICE VISIT (OUTPATIENT)
Dept: FAMILY MEDICINE CLINIC | Age: 73
End: 2023-10-17
Payer: MEDICARE

## 2023-10-17 VITALS
BODY MASS INDEX: 19.7 KG/M2 | SYSTOLIC BLOOD PRESSURE: 128 MMHG | HEIGHT: 68 IN | OXYGEN SATURATION: 100 % | HEART RATE: 62 BPM | WEIGHT: 130 LBS | DIASTOLIC BLOOD PRESSURE: 70 MMHG

## 2023-10-17 DIAGNOSIS — Z00.00 MEDICARE ANNUAL WELLNESS VISIT, SUBSEQUENT: Primary | ICD-10-CM

## 2023-10-17 DIAGNOSIS — I10 ESSENTIAL HYPERTENSION: ICD-10-CM

## 2023-10-17 DIAGNOSIS — E55.9 HYPOVITAMINOSIS D: ICD-10-CM

## 2023-10-17 DIAGNOSIS — D64.9 NORMOCYTIC NORMOCHROMIC ANEMIA: ICD-10-CM

## 2023-10-17 DIAGNOSIS — Z23 FLU VACCINE NEED: ICD-10-CM

## 2023-10-17 DIAGNOSIS — N18.4 STAGE 4 CHRONIC KIDNEY DISEASE (HCC): ICD-10-CM

## 2023-10-17 DIAGNOSIS — E78.2 MIXED HYPERCHOLESTEROLEMIA AND HYPERTRIGLYCERIDEMIA: ICD-10-CM

## 2023-10-17 PROCEDURE — 90694 VACC AIIV4 NO PRSRV 0.5ML IM: CPT | Performed by: FAMILY MEDICINE

## 2023-10-17 PROCEDURE — G0439 PPPS, SUBSEQ VISIT: HCPCS | Performed by: FAMILY MEDICINE

## 2023-10-17 PROCEDURE — 3078F DIAST BP <80 MM HG: CPT | Performed by: FAMILY MEDICINE

## 2023-10-17 PROCEDURE — 1124F ACP DISCUSS-NO DSCNMKR DOCD: CPT | Performed by: FAMILY MEDICINE

## 2023-10-17 PROCEDURE — G0008 ADMIN INFLUENZA VIRUS VAC: HCPCS | Performed by: FAMILY MEDICINE

## 2023-10-17 PROCEDURE — 3074F SYST BP LT 130 MM HG: CPT | Performed by: FAMILY MEDICINE

## 2023-10-17 ASSESSMENT — PATIENT HEALTH QUESTIONNAIRE - PHQ9
SUM OF ALL RESPONSES TO PHQ QUESTIONS 1-9: 0
SUM OF ALL RESPONSES TO PHQ QUESTIONS 1-9: 0
1. LITTLE INTEREST OR PLEASURE IN DOING THINGS: 0
SUM OF ALL RESPONSES TO PHQ QUESTIONS 1-9: 0
2. FEELING DOWN, DEPRESSED OR HOPELESS: 0
SUM OF ALL RESPONSES TO PHQ QUESTIONS 1-9: 0
SUM OF ALL RESPONSES TO PHQ9 QUESTIONS 1 & 2: 0

## 2023-10-17 ASSESSMENT — LIFESTYLE VARIABLES
HOW MANY STANDARD DRINKS CONTAINING ALCOHOL DO YOU HAVE ON A TYPICAL DAY: PATIENT DOES NOT DRINK
HOW OFTEN DO YOU HAVE A DRINK CONTAINING ALCOHOL: NEVER

## 2023-10-27 ENCOUNTER — TELEPHONE (OUTPATIENT)
Dept: FAMILY MEDICINE CLINIC | Age: 73
End: 2023-10-27

## 2024-01-30 RX ORDER — LISINOPRIL 10 MG/1
10 TABLET ORAL DAILY
Qty: 90 TABLET | Refills: 1 | Status: SHIPPED | OUTPATIENT
Start: 2024-01-30

## 2024-01-30 RX ORDER — HYDROCHLOROTHIAZIDE 12.5 MG/1
12.5 TABLET ORAL DAILY
Qty: 90 TABLET | Refills: 1 | Status: SHIPPED | OUTPATIENT
Start: 2024-01-30

## 2024-02-13 ENCOUNTER — TELEPHONE (OUTPATIENT)
Dept: FAMILY MEDICINE CLINIC | Age: 74
End: 2024-02-13

## 2024-04-04 LAB
25(OH)D3 SERPL-MCNC: 77.3 NG/ML (ref 30–100)
ANION GAP SERPL CALCULATED.3IONS-SCNC: 16 MMOL/L (ref 7–16)
BACTERIA URNS QL MICRO: ABNORMAL
BASOPHILS # BLD: 0.06 K/UL (ref 0–0.2)
BASOPHILS NFR BLD: 1 % (ref 0–2)
BILIRUB UR QL STRIP: NEGATIVE
BUN SERPL-MCNC: 37 MG/DL (ref 6–23)
CALCIUM SERPL-MCNC: 10.3 MG/DL (ref 8.6–10.2)
CHLORIDE SERPL-SCNC: 100 MMOL/L (ref 98–107)
CLARITY UR: CLEAR
CO2 SERPL-SCNC: 20 MMOL/L (ref 22–29)
COLOR UR: YELLOW
CREAT SERPL-MCNC: 1.8 MG/DL (ref 0.5–1)
CREAT UR-MCNC: 54.4 MG/DL (ref 29–226)
EOSINOPHIL # BLD: 0.12 K/UL (ref 0.05–0.5)
EOSINOPHILS RELATIVE PERCENT: 2 % (ref 0–6)
ERYTHROCYTE [DISTWIDTH] IN BLOOD BY AUTOMATED COUNT: 12.4 % (ref 11.5–15)
GFR SERPL CREATININE-BSD FRML MDRD: 29 ML/MIN/1.73M2
GLUCOSE SERPL-MCNC: 105 MG/DL (ref 74–99)
GLUCOSE UR STRIP-MCNC: NEGATIVE MG/DL
HCT VFR BLD AUTO: 33.3 % (ref 34–48)
HGB BLD-MCNC: 10.9 G/DL (ref 11.5–15.5)
HGB UR QL STRIP.AUTO: ABNORMAL
KETONES UR STRIP-MCNC: NEGATIVE MG/DL
LEUKOCYTE ESTERASE UR QL STRIP: ABNORMAL
LYMPHOCYTES NFR BLD: 1.56 K/UL (ref 1.5–4)
LYMPHOCYTES RELATIVE PERCENT: 23 % (ref 20–42)
MAGNESIUM SERPL-MCNC: 1.6 MG/DL (ref 1.6–2.6)
MCH RBC QN AUTO: 30.5 PG (ref 26–35)
MCHC RBC AUTO-ENTMCNC: 32.7 G/DL (ref 32–34.5)
MCV RBC AUTO: 93.3 FL (ref 80–99.9)
MICROALBUMIN UR-MCNC: 206 MG/L (ref 0–19)
MICROALBUMIN/CREAT UR-RTO: 378 MCG/MG CREAT (ref 0–30)
MONOCYTES NFR BLD: 0.72 K/UL (ref 0.1–0.95)
MONOCYTES NFR BLD: 10 % (ref 2–12)
NEUTROPHILS NFR BLD: 64 % (ref 43–80)
NEUTS SEG NFR BLD: 4.44 K/UL (ref 1.8–7.3)
NITRITE UR QL STRIP: NEGATIVE
PH UR STRIP: 5.5 [PH] (ref 5–9)
PHOSPHATE SERPL-MCNC: 3.3 MG/DL (ref 2.5–4.5)
PLATELET # BLD AUTO: 173 K/UL (ref 130–450)
PMV BLD AUTO: 10.3 FL (ref 7–12)
POTASSIUM SERPL-SCNC: 5 MMOL/L (ref 3.5–5)
PROT UR STRIP-MCNC: 30 MG/DL
PTH-INTACT SERPL-MCNC: 7.5 PG/ML (ref 15–65)
RBC # BLD AUTO: 3.57 M/UL (ref 3.5–5.5)
RBC # BLD: ABNORMAL 10*6/UL
RBC #/AREA URNS HPF: ABNORMAL /HPF
SODIUM SERPL-SCNC: 136 MMOL/L (ref 132–146)
SP GR UR STRIP: 1.01 (ref 1–1.03)
TOTAL PROTEIN, URINE: 28 MG/DL (ref 0–12)
UROBILINOGEN UR STRIP-ACNC: 0.2 EU/DL (ref 0–1)
WBC #/AREA URNS HPF: ABNORMAL /HPF
WBC OTHER # BLD: 6.9 K/UL (ref 4.5–11.5)

## 2024-04-10 ENCOUNTER — OFFICE VISIT (OUTPATIENT)
Dept: FAMILY MEDICINE CLINIC | Age: 74
End: 2024-04-10
Payer: MEDICARE

## 2024-04-10 VITALS
HEART RATE: 68 BPM | DIASTOLIC BLOOD PRESSURE: 80 MMHG | WEIGHT: 132 LBS | OXYGEN SATURATION: 100 % | SYSTOLIC BLOOD PRESSURE: 126 MMHG | BODY MASS INDEX: 20.07 KG/M2

## 2024-04-10 DIAGNOSIS — N18.4 STAGE 4 CHRONIC KIDNEY DISEASE (HCC): ICD-10-CM

## 2024-04-10 DIAGNOSIS — D64.9 NORMOCYTIC NORMOCHROMIC ANEMIA: ICD-10-CM

## 2024-04-10 DIAGNOSIS — E78.2 MIXED HYPERCHOLESTEROLEMIA AND HYPERTRIGLYCERIDEMIA: Primary | ICD-10-CM

## 2024-04-10 DIAGNOSIS — I10 ESSENTIAL HYPERTENSION: ICD-10-CM

## 2024-04-10 PROCEDURE — 99214 OFFICE O/P EST MOD 30 MIN: CPT | Performed by: FAMILY MEDICINE

## 2024-04-10 PROCEDURE — 1090F PRES/ABSN URINE INCON ASSESS: CPT | Performed by: FAMILY MEDICINE

## 2024-04-10 PROCEDURE — 1036F TOBACCO NON-USER: CPT | Performed by: FAMILY MEDICINE

## 2024-04-10 PROCEDURE — G8427 DOCREV CUR MEDS BY ELIG CLIN: HCPCS | Performed by: FAMILY MEDICINE

## 2024-04-10 PROCEDURE — 3017F COLORECTAL CA SCREEN DOC REV: CPT | Performed by: FAMILY MEDICINE

## 2024-04-10 PROCEDURE — G8420 CALC BMI NORM PARAMETERS: HCPCS | Performed by: FAMILY MEDICINE

## 2024-04-10 PROCEDURE — 1124F ACP DISCUSS-NO DSCNMKR DOCD: CPT | Performed by: FAMILY MEDICINE

## 2024-04-10 PROCEDURE — G8400 PT W/DXA NO RESULTS DOC: HCPCS | Performed by: FAMILY MEDICINE

## 2024-04-10 PROCEDURE — 3079F DIAST BP 80-89 MM HG: CPT | Performed by: FAMILY MEDICINE

## 2024-04-10 PROCEDURE — 3074F SYST BP LT 130 MM HG: CPT | Performed by: FAMILY MEDICINE

## 2024-04-10 RX ORDER — FERROUS SULFATE 325(65) MG
1 TABLET ORAL 2 TIMES DAILY
Qty: 180 TABLET | Refills: 0 | Status: SHIPPED | OUTPATIENT
Start: 2024-04-10

## 2024-04-10 RX ORDER — PATIROMER 8.4 G/1
8.4 POWDER, FOR SUSPENSION ORAL
COMMUNITY
Start: 2024-02-14

## 2024-04-10 RX ORDER — METOPROLOL TARTRATE 75 MG/1
1 TABLET, FILM COATED ORAL 2 TIMES DAILY
Qty: 180 TABLET | Refills: 3 | Status: SHIPPED | OUTPATIENT
Start: 2024-04-10

## 2024-04-10 SDOH — ECONOMIC STABILITY: FOOD INSECURITY: WITHIN THE PAST 12 MONTHS, THE FOOD YOU BOUGHT JUST DIDN'T LAST AND YOU DIDN'T HAVE MONEY TO GET MORE.: NEVER TRUE

## 2024-04-10 SDOH — ECONOMIC STABILITY: FOOD INSECURITY: WITHIN THE PAST 12 MONTHS, YOU WORRIED THAT YOUR FOOD WOULD RUN OUT BEFORE YOU GOT MONEY TO BUY MORE.: NEVER TRUE

## 2024-04-10 SDOH — ECONOMIC STABILITY: INCOME INSECURITY: HOW HARD IS IT FOR YOU TO PAY FOR THE VERY BASICS LIKE FOOD, HOUSING, MEDICAL CARE, AND HEATING?: NOT HARD AT ALL

## 2024-04-10 ASSESSMENT — PATIENT HEALTH QUESTIONNAIRE - PHQ9
2. FEELING DOWN, DEPRESSED OR HOPELESS: NOT AT ALL
SUM OF ALL RESPONSES TO PHQ QUESTIONS 1-9: 0
SUM OF ALL RESPONSES TO PHQ9 QUESTIONS 1 & 2: 0
SUM OF ALL RESPONSES TO PHQ QUESTIONS 1-9: 0
1. LITTLE INTEREST OR PLEASURE IN DOING THINGS: NOT AT ALL

## 2024-04-10 NOTE — PROGRESS NOTES
OFFICE PROGRESS NOTE      SUBJECTIVE:        Patient ID:   Natasha Ferrari is a 73 y.o. female who presents for   Chief Complaint   Patient presents with    Hypertension           HPI:     RECHECK BP, CHOLESTEROL AND CKD.  SEEING NEPHROLOGIST FOR CKD.  RECENTLY BACK FROM [facility] TO [place].  MEDICATION REFILL.  FEELS GOOD.   WATCHING DIET GOOD.  WALKING FOR EXERCISE.  TAKING MEDICATIONS REGULARLY. FORGETS TO TAKE FEOSOL AT TIMES.        Prior to Admission medications    Medication Sig Start Date End Date Taking? Authorizing Provider   VELTASSA 8.4 g PACK packet 1 packet 2/14/24  Yes Yousuf Vivar MD   Metoprolol Tartrate 75 MG TABS Take 1 tablet by mouth 2 times daily 4/10/24  Yes Yoan Roblero MD   ferrous sulfate (FEROSUL) 325 (65 Fe) MG tablet Take 1 tablet by mouth 2 times daily 4/10/24  Yes Yoan Roblero MD   lisinopril (PRINIVIL;ZESTRIL) 10 MG tablet TAKE 1 TABLET BY MOUTH DAILY 1/30/24  Yes Yoan Roblero MD   hydroCHLOROthiazide 12.5 MG tablet TAKE 1 TABLET BY MOUTH DAILY 1/30/24  Yes Yoan Roblero MD   Cholecalciferol (VITAMIN D3) 50 MCG (2000 UT) CAPS Take 1 capsule by mouth daily   Yes Yousuf Vivar MD   Multiple Vitamins-Minerals (CENTRUM SILVER 50+WOMEN) TABS Take by mouth   Yes Yousuf Vivar MD     Social History     Socioeconomic History    Marital status:      Spouse name: None    Number of children: None    Years of education: None    Highest education level: None   Tobacco Use    Smoking status: Never    Smokeless tobacco: Never   Vaping Use    Vaping Use: Never used   Substance and Sexual Activity    Alcohol use: Yes     Comment: Socially    Drug use: Never    Sexual activity: Not Currently     Partners: Male     Social Determinants of Health     Financial Resource Strain: Low Risk  (4/10/2024)    Overall Financial Resource Strain (CARDIA)     Difficulty of Paying Living Expenses: Not hard at all   Food Insecurity: No

## 2024-04-10 NOTE — PATIENT INSTRUCTIONS
LOW SALT FOR BLOOD PRESSURE CONTROL.  LOW FAT DIET FOR CHOLESTEROL CONTROL.  TAKE PRINIVIL 40 MG.,  HCT 12.5. MG. DAILY  AND METOPROLOL 75 MG. 2 TIMES A DAY  FOR BLOOD PRESSURE CONTROL..  TAKE VITAMIN D 2000 UNITS DAILY FOR MAINTENANCE.  TAKE FEOSOL 1 TAB. 2 TIMES A DAY TO IMPROVE BLOOD COUNT.  REGULAR WALKING ADVISED.  FOLLOW UP WITH NEPHROLOGIST AS RECOMMENDED.  FASTING FOR LAB WORK PRIOR TO NEXT VISIT.  NEXT APPOINTMENT IN 3 MONTHS

## 2024-04-25 ENCOUNTER — TELEPHONE (OUTPATIENT)
Dept: FAMILY MEDICINE CLINIC | Age: 74
End: 2024-04-25

## 2024-05-28 ENCOUNTER — PATIENT MESSAGE (OUTPATIENT)
Dept: FAMILY MEDICINE CLINIC | Age: 74
End: 2024-05-28

## 2024-06-05 RX ORDER — METOPROLOL TARTRATE 75 MG/1
1 TABLET, FILM COATED ORAL 2 TIMES DAILY
Qty: 180 TABLET | Refills: 3 | OUTPATIENT
Start: 2024-06-05

## 2024-06-06 ENCOUNTER — TELEPHONE (OUTPATIENT)
Dept: FAMILY MEDICINE CLINIC | Age: 74
End: 2024-06-06

## 2024-06-06 DIAGNOSIS — I10 ESSENTIAL HYPERTENSION: Primary | ICD-10-CM

## 2024-06-06 NOTE — TELEPHONE ENCOUNTER
Called pt and pt requesting to reschedule her appt for June.  Appt rescheduled to 6/19/24.    ----- Message from Stanford Hester sent at 6/6/2024 12:13 PM EDT -----  Regarding: [facility] Appointment Request  [facility] Appointment Request    Patient needs appointment for [facility] Appointment Type: Existing Condition Follow Up.    Reason for Appointment Request: Available appointments did not meet patient need,patient wanted June any day.  --------------------------------------------------------------------------------------------------------------------------    Relationship to Patient: Self     Call Back Information: OK to leave message on [employer]  Preferred Call Back Number: Phone:[phone]

## 2024-06-06 NOTE — TELEPHONE ENCOUNTER
Last seen 4/10/2024  Next appt 6/6/2024    Requested Prescriptions     Pending Prescriptions Disp Refills    Metoprolol Tartrate 75 MG TABS 180 tablet 3     Sig: Take 1 tablet by mouth 2 times daily

## 2024-06-07 RX ORDER — METOPROLOL TARTRATE 75 MG/1
1 TABLET, FILM COATED ORAL 2 TIMES DAILY
Qty: 180 TABLET | Refills: 3 | Status: SHIPPED | OUTPATIENT
Start: 2024-06-07

## 2024-06-10 ENCOUNTER — HOSPITAL ENCOUNTER (INPATIENT)
Age: 74
LOS: 1 days | Discharge: ANOTHER HOSPITAL | End: 2024-06-11
Attending: EMERGENCY MEDICINE | Admitting: FAMILY MEDICINE
Payer: MEDICARE

## 2024-06-10 ENCOUNTER — APPOINTMENT (OUTPATIENT)
Dept: ULTRASOUND IMAGING | Age: 74
End: 2024-06-10
Payer: MEDICARE

## 2024-06-10 ENCOUNTER — APPOINTMENT (OUTPATIENT)
Dept: CT IMAGING | Age: 74
End: 2024-06-10
Payer: MEDICARE

## 2024-06-10 DIAGNOSIS — N18.9 ACUTE KIDNEY INJURY SUPERIMPOSED ON CKD (HCC): ICD-10-CM

## 2024-06-10 DIAGNOSIS — N17.9 ACUTE KIDNEY INJURY SUPERIMPOSED ON CKD (HCC): ICD-10-CM

## 2024-06-10 DIAGNOSIS — E88.3 TUMOR LYSIS SYNDROME: Primary | ICD-10-CM

## 2024-06-10 DIAGNOSIS — C80.1 CANCER (HCC): ICD-10-CM

## 2024-06-10 DIAGNOSIS — R59.1 LYMPHADENOPATHY: ICD-10-CM

## 2024-06-10 DIAGNOSIS — E04.1 THYROID NODULE: ICD-10-CM

## 2024-06-10 PROBLEM — R79.89 ELEVATED D-DIMER: Status: ACTIVE | Noted: 2024-06-10

## 2024-06-10 PROBLEM — E87.5 HYPERKALEMIA: Status: ACTIVE | Noted: 2024-06-10

## 2024-06-10 LAB
ABO + RH BLD: NORMAL
ALBUMIN SERPL-MCNC: 3.5 G/DL (ref 3.5–5.2)
ALBUMIN SERPL-MCNC: 3.5 G/DL (ref 3.5–5.2)
ALP SERPL-CCNC: 113 U/L (ref 35–104)
ALP SERPL-CCNC: 117 U/L (ref 35–104)
ALT SERPL-CCNC: 28 U/L (ref 0–32)
ALT SERPL-CCNC: 30 U/L (ref 0–32)
ANION GAP SERPL CALCULATED.3IONS-SCNC: 16 MMOL/L (ref 7–16)
ANION GAP SERPL CALCULATED.3IONS-SCNC: 16 MMOL/L (ref 7–16)
ARM BAND NUMBER: NORMAL
AST SERPL-CCNC: 163 U/L (ref 0–31)
AST SERPL-CCNC: 179 U/L (ref 0–31)
BASOPHILS # BLD: 0 K/UL (ref 0–0.2)
BASOPHILS NFR BLD: 0 % (ref 0–2)
BILIRUB SERPL-MCNC: 0.4 MG/DL (ref 0–1.2)
BILIRUB SERPL-MCNC: 0.4 MG/DL (ref 0–1.2)
BLASTS ABSOLUTE COUNT: 29.23 K/UL
BLASTS: 48 %
BLOOD BANK SAMPLE EXPIRATION: NORMAL
BLOOD GROUP ANTIBODIES SERPL: NEGATIVE
BNP SERPL-MCNC: 1313 PG/ML (ref 0–450)
BUN SERPL-MCNC: 53 MG/DL (ref 6–23)
BUN SERPL-MCNC: 60 MG/DL (ref 6–23)
CALCIUM SERPL-MCNC: 10.2 MG/DL (ref 8.6–10.2)
CALCIUM SERPL-MCNC: 9.4 MG/DL (ref 8.6–10.2)
CHLORIDE SERPL-SCNC: 102 MMOL/L (ref 98–107)
CHLORIDE SERPL-SCNC: 98 MMOL/L (ref 98–107)
CK SERPL-CCNC: 28 U/L (ref 20–180)
CO2 SERPL-SCNC: 17 MMOL/L (ref 22–29)
CO2 SERPL-SCNC: 18 MMOL/L (ref 22–29)
CREAT SERPL-MCNC: 2.3 MG/DL (ref 0.5–1)
CREAT SERPL-MCNC: 2.8 MG/DL (ref 0.5–1)
D-DIMER QUANTITATIVE: 3022 NG/ML DDU (ref 0–230)
EOSINOPHIL # BLD: 0 K/UL (ref 0.05–0.5)
EOSINOPHILS RELATIVE PERCENT: 0 % (ref 0–6)
ERYTHROCYTE [DISTWIDTH] IN BLOOD BY AUTOMATED COUNT: 14.6 % (ref 11.5–15)
FIBRINOGEN PPP-MCNC: 153 MG/DL (ref 200–400)
GFR, ESTIMATED: 18 ML/MIN/1.73M2
GFR, ESTIMATED: 22 ML/MIN/1.73M2
GLUCOSE SERPL-MCNC: 104 MG/DL (ref 74–99)
GLUCOSE SERPL-MCNC: 98 MG/DL (ref 74–99)
HCT VFR BLD AUTO: 31.1 % (ref 34–48)
HGB BLD-MCNC: 9.8 G/DL (ref 11.5–15.5)
INR PPP: 1.4
LDH SERPL-CCNC: >2500 U/L (ref 135–214)
LYMPHOCYTES NFR BLD: 9.74 K/UL (ref 1.5–4)
LYMPHOCYTES RELATIVE PERCENT: 16 % (ref 20–42)
MCH RBC QN AUTO: 28.3 PG (ref 26–35)
MCHC RBC AUTO-ENTMCNC: 31.5 G/DL (ref 32–34.5)
MCV RBC AUTO: 89.9 FL (ref 80–99.9)
MONOCYTES NFR BLD: 4.26 K/UL (ref 0.1–0.95)
MONOCYTES NFR BLD: 7 % (ref 2–12)
MYELOCYTES ABSOLUTE COUNT: 1.22 K/UL
MYELOCYTES: 2 %
NEUTROPHILS NFR BLD: 27 % (ref 43–80)
NEUTS SEG NFR BLD: 16.44 K/UL (ref 1.8–7.3)
PARTIAL THROMBOPLASTIN TIME: 27.5 SEC (ref 24.5–35.1)
PHOSPHATE SERPL-MCNC: 3.7 MG/DL (ref 2.5–4.5)
PLATELET # BLD AUTO: 159 K/UL (ref 130–450)
PMV BLD AUTO: 9.6 FL (ref 7–12)
POTASSIUM SERPL-SCNC: 5.3 MMOL/L (ref 3.5–5)
POTASSIUM SERPL-SCNC: 5.6 MMOL/L (ref 3.5–5)
PROT SERPL-MCNC: 5.3 G/DL (ref 6.4–8.3)
PROT SERPL-MCNC: 5.7 G/DL (ref 6.4–8.3)
PROTHROMBIN TIME: 15.2 SEC (ref 9.3–12.4)
RBC # BLD AUTO: 3.46 M/UL (ref 3.5–5.5)
RBC # BLD: ABNORMAL 10*6/UL
SODIUM SERPL-SCNC: 132 MMOL/L (ref 132–146)
SODIUM SERPL-SCNC: 135 MMOL/L (ref 132–146)
TROPONIN I SERPL HS-MCNC: 44 NG/L (ref 0–9)
TROPONIN I SERPL HS-MCNC: 57 NG/L (ref 0–9)
URATE SERPL-MCNC: 24.3 MG/DL (ref 2.4–5.7)
WBC # BLD: ABNORMAL 10*3/UL
WBC OTHER # BLD: 60.9 K/UL (ref 4.5–11.5)

## 2024-06-10 PROCEDURE — 86900 BLOOD TYPING SEROLOGIC ABO: CPT

## 2024-06-10 PROCEDURE — 71250 CT THORAX DX C-: CPT

## 2024-06-10 PROCEDURE — 86901 BLOOD TYPING SEROLOGIC RH(D): CPT

## 2024-06-10 PROCEDURE — 85730 THROMBOPLASTIN TIME PARTIAL: CPT

## 2024-06-10 PROCEDURE — 74176 CT ABD & PELVIS W/O CONTRAST: CPT

## 2024-06-10 PROCEDURE — 80053 COMPREHEN METABOLIC PANEL: CPT

## 2024-06-10 PROCEDURE — 2580000003 HC RX 258: Performed by: EMERGENCY MEDICINE

## 2024-06-10 PROCEDURE — 76536 US EXAM OF HEAD AND NECK: CPT

## 2024-06-10 PROCEDURE — 2580000003 HC RX 258: Performed by: FAMILY MEDICINE

## 2024-06-10 PROCEDURE — 85025 COMPLETE CBC W/AUTO DIFF WBC: CPT

## 2024-06-10 PROCEDURE — 84484 ASSAY OF TROPONIN QUANT: CPT

## 2024-06-10 PROCEDURE — 82550 ASSAY OF CK (CPK): CPT

## 2024-06-10 PROCEDURE — 99223 1ST HOSP IP/OBS HIGH 75: CPT | Performed by: FAMILY MEDICINE

## 2024-06-10 PROCEDURE — 84100 ASSAY OF PHOSPHORUS: CPT

## 2024-06-10 PROCEDURE — 85384 FIBRINOGEN ACTIVITY: CPT

## 2024-06-10 PROCEDURE — 70490 CT SOFT TISSUE NECK W/O DYE: CPT

## 2024-06-10 PROCEDURE — 83880 ASSAY OF NATRIURETIC PEPTIDE: CPT

## 2024-06-10 PROCEDURE — 85379 FIBRIN DEGRADATION QUANT: CPT

## 2024-06-10 PROCEDURE — 36415 COLL VENOUS BLD VENIPUNCTURE: CPT

## 2024-06-10 PROCEDURE — 86850 RBC ANTIBODY SCREEN: CPT

## 2024-06-10 PROCEDURE — 93005 ELECTROCARDIOGRAM TRACING: CPT | Performed by: EMERGENCY MEDICINE

## 2024-06-10 PROCEDURE — 84550 ASSAY OF BLOOD/URIC ACID: CPT

## 2024-06-10 PROCEDURE — 99285 EMERGENCY DEPT VISIT HI MDM: CPT

## 2024-06-10 PROCEDURE — 6360000002 HC RX W HCPCS: Performed by: FAMILY MEDICINE

## 2024-06-10 PROCEDURE — 83615 LACTATE (LD) (LDH) ENZYME: CPT

## 2024-06-10 PROCEDURE — 85610 PROTHROMBIN TIME: CPT

## 2024-06-10 PROCEDURE — 2060000000 HC ICU INTERMEDIATE R&B

## 2024-06-10 RX ORDER — 0.9 % SODIUM CHLORIDE 0.9 %
30 INTRAVENOUS SOLUTION INTRAVENOUS ONCE
Status: COMPLETED | OUTPATIENT
Start: 2024-06-10 | End: 2024-06-10

## 2024-06-10 RX ORDER — ENOXAPARIN SODIUM 100 MG/ML
30 INJECTION SUBCUTANEOUS DAILY
Status: DISCONTINUED | OUTPATIENT
Start: 2024-06-10 | End: 2024-06-11 | Stop reason: HOSPADM

## 2024-06-10 RX ORDER — ACETAMINOPHEN 650 MG/1
650 SUPPOSITORY RECTAL EVERY 6 HOURS PRN
Status: DISCONTINUED | OUTPATIENT
Start: 2024-06-10 | End: 2024-06-11 | Stop reason: HOSPADM

## 2024-06-10 RX ORDER — SODIUM CHLORIDE 9 MG/ML
INJECTION, SOLUTION INTRAVENOUS PRN
Status: DISCONTINUED | OUTPATIENT
Start: 2024-06-10 | End: 2024-06-11 | Stop reason: HOSPADM

## 2024-06-10 RX ORDER — 0.9 % SODIUM CHLORIDE 0.9 %
1000 INTRAVENOUS SOLUTION INTRAVENOUS ONCE
Status: COMPLETED | OUTPATIENT
Start: 2024-06-10 | End: 2024-06-10

## 2024-06-10 RX ORDER — SODIUM CHLORIDE 9 MG/ML
INJECTION, SOLUTION INTRAVENOUS CONTINUOUS
Status: DISCONTINUED | OUTPATIENT
Start: 2024-06-10 | End: 2024-06-11

## 2024-06-10 RX ORDER — SODIUM CHLORIDE 0.9 % (FLUSH) 0.9 %
5-40 SYRINGE (ML) INJECTION EVERY 12 HOURS SCHEDULED
Status: DISCONTINUED | OUTPATIENT
Start: 2024-06-10 | End: 2024-06-11 | Stop reason: HOSPADM

## 2024-06-10 RX ORDER — SODIUM CHLORIDE 0.9 % (FLUSH) 0.9 %
5-40 SYRINGE (ML) INJECTION PRN
Status: DISCONTINUED | OUTPATIENT
Start: 2024-06-10 | End: 2024-06-11 | Stop reason: HOSPADM

## 2024-06-10 RX ORDER — ACETAMINOPHEN 325 MG/1
650 TABLET ORAL EVERY 6 HOURS PRN
Status: DISCONTINUED | OUTPATIENT
Start: 2024-06-10 | End: 2024-06-11 | Stop reason: HOSPADM

## 2024-06-10 RX ADMIN — SODIUM CHLORIDE, PRESERVATIVE FREE 10 ML: 5 INJECTION INTRAVENOUS at 23:16

## 2024-06-10 RX ADMIN — ENOXAPARIN SODIUM 30 MG: 100 INJECTION SUBCUTANEOUS at 23:11

## 2024-06-10 RX ADMIN — SODIUM CHLORIDE: 9 INJECTION, SOLUTION INTRAVENOUS at 23:12

## 2024-06-10 RX ADMIN — SODIUM CHLORIDE: 9 INJECTION, SOLUTION INTRAVENOUS at 20:57

## 2024-06-10 RX ADMIN — SODIUM CHLORIDE 1797 ML: 9 INJECTION, SOLUTION INTRAVENOUS at 20:58

## 2024-06-10 RX ADMIN — SODIUM CHLORIDE 1000 ML: 9 INJECTION, SOLUTION INTRAVENOUS at 15:11

## 2024-06-11 ENCOUNTER — APPOINTMENT (OUTPATIENT)
Dept: CARDIOLOGY | Facility: HOSPITAL | Age: 74
End: 2024-06-11
Payer: MEDICARE

## 2024-06-11 ENCOUNTER — APPOINTMENT (OUTPATIENT)
Age: 74
End: 2024-06-11
Attending: FAMILY MEDICINE
Payer: MEDICARE

## 2024-06-11 ENCOUNTER — HOSPITAL ENCOUNTER (INPATIENT)
Facility: HOSPITAL | Age: 74
DRG: 840 | End: 2024-06-11
Attending: STUDENT IN AN ORGANIZED HEALTH CARE EDUCATION/TRAINING PROGRAM
Payer: MEDICARE

## 2024-06-11 VITALS
BODY MASS INDEX: 18.34 KG/M2 | HEART RATE: 105 BPM | OXYGEN SATURATION: 97 % | SYSTOLIC BLOOD PRESSURE: 110 MMHG | TEMPERATURE: 97.5 F | HEIGHT: 68 IN | WEIGHT: 121 LBS | RESPIRATION RATE: 20 BRPM | DIASTOLIC BLOOD PRESSURE: 58 MMHG

## 2024-06-11 DIAGNOSIS — C85.10 HIGH GRADE B-CELL LYMPHOMA (MULTI): ICD-10-CM

## 2024-06-11 DIAGNOSIS — R59.1 LYMPHADENOPATHY: ICD-10-CM

## 2024-06-11 DIAGNOSIS — C95.00 ACUTE LEUKEMIA NOT HAVING ACHIEVED REMISSION (MULTI): ICD-10-CM

## 2024-06-11 DIAGNOSIS — Z01.818 ENCOUNTER FOR OTHER PREPROCEDURAL EXAMINATION: ICD-10-CM

## 2024-06-11 DIAGNOSIS — C93.10 CHRONIC MYELOMONOCYTIC LEUKEMIA NOT HAVING ACHIEVED REMISSION (MULTI): Primary | ICD-10-CM

## 2024-06-11 PROBLEM — D72.829 LEUKOCYTOSIS: Status: ACTIVE | Noted: 2024-06-11

## 2024-06-11 LAB
ABO GROUP (TYPE) IN BLOOD: NORMAL
ALBUMIN SERPL BCP-MCNC: 3.5 G/DL (ref 3.4–5)
ALBUMIN SERPL-MCNC: 3.2 G/DL (ref 3.5–5.2)
ALP SERPL-CCNC: 100 U/L (ref 35–104)
ALP SERPL-CCNC: 102 U/L (ref 33–136)
ALT SERPL W P-5'-P-CCNC: 28 U/L (ref 7–45)
ALT SERPL-CCNC: 25 U/L (ref 0–32)
ANION GAP SERPL CALC-SCNC: 15 MMOL/L (ref 10–20)
ANION GAP SERPL CALCULATED.3IONS-SCNC: 15 MMOL/L (ref 7–16)
ANTIBODY SCREEN: NORMAL
APTT PPP: 26 SECONDS (ref 27–38)
AST SERPL W P-5'-P-CCNC: 158 U/L (ref 9–39)
AST SERPL-CCNC: 155 U/L (ref 0–31)
B-HCG SERPL-ACNC: 4 MIU/ML
BASOPHILS # BLD MANUAL: 0 X10*3/UL (ref 0–0.1)
BASOPHILS # BLD: 0 K/UL (ref 0–0.2)
BASOPHILS NFR BLD MANUAL: 0 %
BASOPHILS NFR BLD: 0 % (ref 0–2)
BILIRUB DIRECT SERPL-MCNC: 0.1 MG/DL (ref 0–0.3)
BILIRUB SERPL-MCNC: 0.3 MG/DL (ref 0–1.2)
BILIRUB SERPL-MCNC: 0.6 MG/DL (ref 0–1.2)
BLASTS # BLD MANUAL: 30.8 X10*3/UL
BLASTS ABSOLUTE COUNT: 24.62 K/UL
BLASTS NFR BLD MANUAL: 53.2 %
BLASTS: 45 %
BNP SERPL-MCNC: 23 PG/ML (ref 0–99)
BUN SERPL-MCNC: 44 MG/DL (ref 6–23)
BUN SERPL-MCNC: 50 MG/DL (ref 6–23)
BURR CELLS BLD QL SMEAR: ABNORMAL
CALCIUM SERPL-MCNC: 8.8 MG/DL (ref 8.6–10.2)
CALCIUM SERPL-MCNC: 9 MG/DL (ref 8.6–10.6)
CARDIAC TROPONIN I PNL SERPL HS: 38 NG/L (ref 0–34)
CHLORIDE SERPL-SCNC: 105 MMOL/L (ref 98–107)
CHLORIDE SERPL-SCNC: 106 MMOL/L (ref 98–107)
CMV IGG AVIDITY SERPL IA-RTO: REACTIVE %
CMV IGG AVIDITY SERPL IA-RTO: REACTIVE %
CO2 SERPL-SCNC: 16 MMOL/L (ref 22–29)
CO2 SERPL-SCNC: 23 MMOL/L (ref 21–32)
CREAT SERPL-MCNC: 1.88 MG/DL (ref 0.5–1.05)
CREAT SERPL-MCNC: 2.3 MG/DL (ref 0.5–1)
EBV EA IGG SER QL: POSITIVE
EBV NA AB SER QL: POSITIVE
EBV VCA IGG SER IA-ACNC: POSITIVE
EBV VCA IGM SER IA-ACNC: NEGATIVE
EGFRCR SERPLBLD CKD-EPI 2021: 28 ML/MIN/1.73M*2
EKG ATRIAL RATE: 132 BPM
EKG ATRIAL RATE: 85 BPM
EKG P AXIS: 45 DEGREES
EKG P AXIS: 45 DEGREES
EKG P-R INTERVAL: 126 MS
EKG P-R INTERVAL: 136 MS
EKG Q-T INTERVAL: 306 MS
EKG Q-T INTERVAL: 380 MS
EKG QRS DURATION: 82 MS
EKG QRS DURATION: 86 MS
EKG QTC CALCULATION (BAZETT): 453 MS
EKG QTC CALCULATION (BAZETT): 528 MS
EKG R AXIS: -5 DEGREES
EKG R AXIS: 57 DEGREES
EKG T AXIS: 50 DEGREES
EKG T AXIS: 62 DEGREES
EKG VENTRICULAR RATE: 116 BPM
EKG VENTRICULAR RATE: 132 BPM
EOSINOPHIL # BLD MANUAL: 0 X10*3/UL (ref 0–0.4)
EOSINOPHIL # BLD: 0 K/UL (ref 0.05–0.5)
EOSINOPHIL NFR BLD MANUAL: 0 %
EOSINOPHILS RELATIVE PERCENT: 0 % (ref 0–6)
ERYTHROCYTE [DISTWIDTH] IN BLOOD BY AUTOMATED COUNT: 14.6 % (ref 11.5–15)
ERYTHROCYTE [DISTWIDTH] IN BLOOD BY AUTOMATED COUNT: 14.8 % (ref 11.5–14.5)
FIBRINOGEN PPP-MCNC: 203 MG/DL (ref 200–400)
GFR, ESTIMATED: 22 ML/MIN/1.73M2
GLUCOSE SERPL-MCNC: 100 MG/DL (ref 74–99)
GLUCOSE SERPL-MCNC: 87 MG/DL (ref 74–99)
HBV CORE AB SER QL: NONREACTIVE
HBV CORE IGM SER QL: NONREACTIVE
HBV CORE IGM SER QL: NONREACTIVE
HBV SURFACE AB SER-ACNC: <3.1 MIU/ML
HBV SURFACE AB SER-ACNC: <3.1 MIU/ML
HBV SURFACE AG SERPL QL IA: NONREACTIVE
HBV SURFACE AG SERPL QL IA: NONREACTIVE
HCT VFR BLD AUTO: 27.2 % (ref 34–48)
HCT VFR BLD AUTO: 30.5 % (ref 36–46)
HCV AB SER QL: NONREACTIVE
HCV AB SER QL: NONREACTIVE
HERPES SIMPLEX VIRUS 1 IGG: <0.2 INDEX
HERPES SIMPLEX VIRUS 2 IGG: >8 INDEX
HGB BLD-MCNC: 8.7 G/DL (ref 11.5–15.5)
HGB BLD-MCNC: 9.6 G/DL (ref 12–16)
HIV 1+2 AB+HIV1 P24 AG SERPL QL IA: NONREACTIVE
IMM GRANULOCYTES # BLD AUTO: 0.71 X10*3/UL (ref 0–0.5)
IMM GRANULOCYTES NFR BLD AUTO: 1.2 % (ref 0–0.9)
INR PPP: 1.2 (ref 0.9–1.1)
LDH SERPL L TO P-CCNC: 5028 U/L (ref 84–246)
LYMPHOCYTES # BLD MANUAL: 6.77 X10*3/UL (ref 0.8–3)
LYMPHOCYTES NFR BLD MANUAL: 11.7 %
LYMPHOCYTES NFR BLD: 12.03 K/UL (ref 1.5–4)
LYMPHOCYTES RELATIVE PERCENT: 22 % (ref 20–42)
MAGNESIUM SERPL-MCNC: 1.8 MG/DL (ref 1.6–2.6)
MAGNESIUM SERPL-MCNC: 1.83 MG/DL (ref 1.6–2.4)
MCH RBC QN AUTO: 28.9 PG (ref 26–34)
MCH RBC QN AUTO: 29.2 PG (ref 26–35)
MCHC RBC AUTO-ENTMCNC: 31.5 G/DL (ref 32–36)
MCHC RBC AUTO-ENTMCNC: 32 G/DL (ref 32–34.5)
MCV RBC AUTO: 91.3 FL (ref 80–99.9)
MCV RBC AUTO: 92 FL (ref 80–100)
MONOCYTES # BLD MANUAL: 2.61 X10*3/UL (ref 0.05–0.8)
MONOCYTES NFR BLD MANUAL: 4.5 %
MONOCYTES NFR BLD: 12.58 K/UL (ref 0.1–0.95)
MONOCYTES NFR BLD: 23 % (ref 2–12)
MYELOCYTES # BLD MANUAL: 0.52 X10*3/UL
MYELOCYTES NFR BLD MANUAL: 0.9 %
NEUTROPHILS # BLD MANUAL: 17.19 X10*3/UL (ref 1.6–5.5)
NEUTROPHILS NFR BLD: 10 % (ref 43–80)
NEUTS BAND # BLD MANUAL: 2.08 X10*3/UL (ref 0–0.5)
NEUTS BAND NFR BLD MANUAL: 3.6 %
NEUTS SEG # BLD MANUAL: 15.11 X10*3/UL (ref 1.6–5)
NEUTS SEG NFR BLD MANUAL: 26.1 %
NEUTS SEG NFR BLD: 5.47 K/UL (ref 1.8–7.3)
NRBC BLD-RTO: 0.2 /100 WBCS (ref 0–0)
PATH REV BLD -IMP: NORMAL
PHOSPHATE SERPL-MCNC: 2.5 MG/DL (ref 2.5–4.9)
PHOSPHATE SERPL-MCNC: 3.3 MG/DL (ref 2.5–4.5)
PLATELET # BLD AUTO: 158 K/UL (ref 130–450)
PLATELET # BLD AUTO: 179 X10*3/UL (ref 150–450)
PMV BLD AUTO: 9.6 FL (ref 7–12)
POTASSIUM SERPL-SCNC: 4.5 MMOL/L (ref 3.5–5.3)
POTASSIUM SERPL-SCNC: 5.3 MMOL/L (ref 3.5–5)
PROT SERPL-MCNC: 5 G/DL (ref 6.4–8.3)
PROT SERPL-MCNC: 5.5 G/DL (ref 6.4–8.2)
PROTHROMBIN TIME: 13.7 SECONDS (ref 9.8–12.8)
RBC # BLD AUTO: 2.98 M/UL (ref 3.5–5.5)
RBC # BLD AUTO: 3.32 X10*6/UL (ref 4–5.2)
RBC # BLD: ABNORMAL 10*6/UL
RBC MORPH BLD: ABNORMAL
RH FACTOR (ANTIGEN D): NORMAL
SARS-COV-2 RNA RESP QL NAA+PROBE: NOT DETECTED
SODIUM SERPL-SCNC: 136 MMOL/L (ref 132–146)
SODIUM SERPL-SCNC: 139 MMOL/L (ref 136–145)
T GONDII IGG SER-ACNC: NONREACTIVE
TOTAL CELLS COUNTED BLD: 111
TREPONEMA PALLIDUM IGG+IGM AB [PRESENCE] IN SERUM OR PLASMA BY IMMUNOASSAY: NONREACTIVE
TSH SERPL DL<=0.05 MIU/L-ACNC: 3.53 UIU/ML (ref 0.27–4.2)
URATE SERPL-MCNC: 11.3 MG/DL (ref 2.3–6.7)
URATE SERPL-MCNC: 16.9 MG/DL (ref 2.4–5.7)
VARICELLA ZOSTER IGG INDEX: 2 IA
VZV IGG SER QL IA: POSITIVE
WBC # BLD AUTO: 57.9 X10*3/UL (ref 4.4–11.3)
WBC OTHER # BLD: 54.7 K/UL (ref 4.5–11.5)

## 2024-06-11 PROCEDURE — 86695 HERPES SIMPLEX TYPE 1 TEST: CPT

## 2024-06-11 PROCEDURE — 83880 ASSAY OF NATRIURETIC PEPTIDE: CPT

## 2024-06-11 PROCEDURE — 86780 TREPONEMA PALLIDUM: CPT

## 2024-06-11 PROCEDURE — 93005 ELECTROCARDIOGRAM TRACING: CPT

## 2024-06-11 PROCEDURE — 81379 HLA I TYPING COMPLETE HR: CPT

## 2024-06-11 PROCEDURE — 88271 CYTOGENETICS DNA PROBE: CPT

## 2024-06-11 PROCEDURE — 84100 ASSAY OF PHOSPHORUS: CPT

## 2024-06-11 PROCEDURE — 36415 COLL VENOUS BLD VENIPUNCTURE: CPT

## 2024-06-11 PROCEDURE — 83735 ASSAY OF MAGNESIUM: CPT

## 2024-06-11 PROCEDURE — 86803 HEPATITIS C AB TEST: CPT | Mod: 91,MUE

## 2024-06-11 PROCEDURE — 86705 HEP B CORE ANTIBODY IGM: CPT | Mod: 91,MUE

## 2024-06-11 PROCEDURE — 93010 ELECTROCARDIOGRAM REPORT: CPT | Performed by: INTERNAL MEDICINE

## 2024-06-11 PROCEDURE — 99223 1ST HOSP IP/OBS HIGH 75: CPT | Performed by: INTERNAL MEDICINE

## 2024-06-11 PROCEDURE — 84075 ASSAY ALKALINE PHOSPHATASE: CPT

## 2024-06-11 PROCEDURE — 85384 FIBRINOGEN ACTIVITY: CPT

## 2024-06-11 PROCEDURE — 93005 ELECTROCARDIOGRAM TRACING: CPT | Performed by: INTERNAL MEDICINE

## 2024-06-11 PROCEDURE — 88271 CYTOGENETICS DNA PROBE: CPT | Mod: 91

## 2024-06-11 PROCEDURE — 86704 HEP B CORE ANTIBODY TOTAL: CPT

## 2024-06-11 PROCEDURE — 85027 COMPLETE CBC AUTOMATED: CPT

## 2024-06-11 PROCEDURE — 2500000004 HC RX 250 GENERAL PHARMACY W/ HCPCS (ALT 636 FOR OP/ED)

## 2024-06-11 PROCEDURE — 86645 CMV ANTIBODY IGM: CPT

## 2024-06-11 PROCEDURE — 87635 SARS-COV-2 COVID-19 AMP PRB: CPT

## 2024-06-11 PROCEDURE — 88185 FLOWCYTOMETRY/TC ADD-ON: CPT | Mod: TC,91

## 2024-06-11 PROCEDURE — 2500000003 HC RX 250 WO HCPCS: Performed by: NURSE PRACTITIONER

## 2024-06-11 PROCEDURE — 2580000003 HC RX 258: Performed by: FAMILY MEDICINE

## 2024-06-11 PROCEDURE — 2580000003 HC RX 258: Performed by: NURSE PRACTITIONER

## 2024-06-11 PROCEDURE — 85007 BL SMEAR W/DIFF WBC COUNT: CPT

## 2024-06-11 PROCEDURE — 80053 COMPREHEN METABOLIC PANEL: CPT

## 2024-06-11 PROCEDURE — 85025 COMPLETE CBC W/AUTO DIFF WBC: CPT

## 2024-06-11 PROCEDURE — 84550 ASSAY OF BLOOD/URIC ACID: CPT

## 2024-06-11 PROCEDURE — 85610 PROTHROMBIN TIME: CPT

## 2024-06-11 PROCEDURE — 86644 CMV ANTIBODY: CPT

## 2024-06-11 PROCEDURE — 2500000003 HC RX 250 WO HCPCS: Performed by: INTERNAL MEDICINE

## 2024-06-11 PROCEDURE — 87081 CULTURE SCREEN ONLY: CPT

## 2024-06-11 PROCEDURE — 86901 BLOOD TYPING SEROLOGIC RH(D): CPT

## 2024-06-11 PROCEDURE — 86790 VIRUS ANTIBODY NOS: CPT

## 2024-06-11 PROCEDURE — 87340 HEPATITIS B SURFACE AG IA: CPT

## 2024-06-11 PROCEDURE — 86706 HEP B SURFACE ANTIBODY: CPT

## 2024-06-11 PROCEDURE — 99239 HOSP IP/OBS DSCHRG MGMT >30: CPT | Performed by: INTERNAL MEDICINE

## 2024-06-11 PROCEDURE — 84702 CHORIONIC GONADOTROPIN TEST: CPT

## 2024-06-11 PROCEDURE — 6370000000 HC RX 637 (ALT 250 FOR IP): Performed by: INTERNAL MEDICINE

## 2024-06-11 PROCEDURE — 1170000001 HC PRIVATE ONCOLOGY ROOM DAILY

## 2024-06-11 PROCEDURE — 2500000001 HC RX 250 WO HCPCS SELF ADMINISTERED DRUGS (ALT 637 FOR MEDICARE OP)

## 2024-06-11 PROCEDURE — 86777 TOXOPLASMA ANTIBODY: CPT

## 2024-06-11 PROCEDURE — 86787 VARICELLA-ZOSTER ANTIBODY: CPT

## 2024-06-11 PROCEDURE — 83615 LACTATE (LD) (LDH) ENZYME: CPT

## 2024-06-11 PROCEDURE — 87389 HIV-1 AG W/HIV-1&-2 AB AG IA: CPT

## 2024-06-11 PROCEDURE — 84443 ASSAY THYROID STIM HORMONE: CPT

## 2024-06-11 PROCEDURE — 84484 ASSAY OF TROPONIN QUANT: CPT

## 2024-06-11 PROCEDURE — 86663 EPSTEIN-BARR ANTIBODY: CPT

## 2024-06-11 RX ORDER — ACETAMINOPHEN 500 MG
2000 TABLET ORAL DAILY
COMMUNITY

## 2024-06-11 RX ORDER — METOPROLOL TARTRATE 75 MG/1
75 TABLET, FILM COATED ORAL 2 TIMES DAILY
COMMUNITY
Start: 2024-06-07

## 2024-06-11 RX ORDER — CLONAZEPAM 0.5 MG/1
0.5 TABLET, ORALLY DISINTEGRATING ORAL ONCE
Status: COMPLETED | OUTPATIENT
Start: 2024-06-11 | End: 2024-06-11

## 2024-06-11 RX ORDER — ENOXAPARIN SODIUM 100 MG/ML
40 INJECTION SUBCUTANEOUS EVERY 24 HOURS
Status: DISCONTINUED | OUTPATIENT
Start: 2024-06-11 | End: 2024-06-11

## 2024-06-11 RX ORDER — HEPARIN SODIUM 5000 [USP'U]/ML
5000 INJECTION, SOLUTION INTRAVENOUS; SUBCUTANEOUS EVERY 8 HOURS
Status: DISCONTINUED | OUTPATIENT
Start: 2024-06-11 | End: 2024-06-17 | Stop reason: HOSPADM

## 2024-06-11 RX ORDER — LORAZEPAM 1 MG/1
1 TABLET ORAL EVERY 8 HOURS PRN
Status: DISCONTINUED | OUTPATIENT
Start: 2024-06-11 | End: 2024-06-12

## 2024-06-11 RX ORDER — HYDROCHLOROTHIAZIDE 12.5 MG/1
12.5 TABLET ORAL DAILY
COMMUNITY
Start: 2024-05-20

## 2024-06-11 RX ORDER — HYDROCHLOROTHIAZIDE 25 MG/1
12.5 TABLET ORAL DAILY
Status: DISCONTINUED | OUTPATIENT
Start: 2024-06-11 | End: 2024-06-17 | Stop reason: HOSPADM

## 2024-06-11 RX ORDER — PANTOPRAZOLE SODIUM 40 MG/1
40 TABLET, DELAYED RELEASE ORAL
Status: DISCONTINUED | OUTPATIENT
Start: 2024-06-12 | End: 2024-06-17 | Stop reason: HOSPADM

## 2024-06-11 RX ORDER — FERROUS SULFATE 325(65) MG
325 TABLET ORAL 2 TIMES DAILY
COMMUNITY
Start: 2022-08-03

## 2024-06-11 RX ORDER — SODIUM CHLORIDE 9 MG/ML
100 INJECTION, SOLUTION INTRAVENOUS CONTINUOUS
Status: DISCONTINUED | OUTPATIENT
Start: 2024-06-11 | End: 2024-06-17 | Stop reason: HOSPADM

## 2024-06-11 RX ORDER — ACYCLOVIR 400 MG/1
400 TABLET ORAL EVERY 12 HOURS SCHEDULED
Status: DISCONTINUED | OUTPATIENT
Start: 2024-06-11 | End: 2024-06-17 | Stop reason: HOSPADM

## 2024-06-11 RX ORDER — METOPROLOL TARTRATE 1 MG/ML
5 INJECTION, SOLUTION INTRAVENOUS ONCE
Status: COMPLETED | OUTPATIENT
Start: 2024-06-11 | End: 2024-06-11

## 2024-06-11 RX ORDER — ALLOPURINOL 300 MG/1
300 TABLET ORAL DAILY
Status: DISCONTINUED | OUTPATIENT
Start: 2024-06-11 | End: 2024-06-12

## 2024-06-11 RX ORDER — PATIROMER 8.4 G/1
1 POWDER, FOR SUSPENSION ORAL DAILY
COMMUNITY
Start: 2024-02-01

## 2024-06-11 RX ORDER — LISINOPRIL 10 MG/1
10 TABLET ORAL DAILY
COMMUNITY

## 2024-06-11 RX ORDER — MULTIVIT-MIN/IRON FUM/FOLIC AC 7.5 MG-4
1 TABLET ORAL DAILY
COMMUNITY

## 2024-06-11 RX ORDER — ENOXAPARIN SODIUM 100 MG/ML
30 INJECTION SUBCUTANEOUS DAILY
DISCHARGE
Start: 2024-06-11

## 2024-06-11 RX ORDER — HYDROXYUREA 500 MG/1
500 CAPSULE ORAL EVERY 8 HOURS SCHEDULED
Status: DISCONTINUED | OUTPATIENT
Start: 2024-06-11 | End: 2024-06-12

## 2024-06-11 RX ADMIN — SODIUM CHLORIDE: 9 INJECTION, SOLUTION INTRAVENOUS at 06:32

## 2024-06-11 RX ADMIN — SODIUM BICARBONATE: 84 INJECTION, SOLUTION INTRAVENOUS at 12:22

## 2024-06-11 RX ADMIN — METOPROLOL TARTRATE 5 MG: 5 INJECTION INTRAVENOUS at 13:46

## 2024-06-11 RX ADMIN — CLONAZEPAM 0.5 MG: 0.5 TABLET, ORALLY DISINTEGRATING ORAL at 12:21

## 2024-06-11 SDOH — SOCIAL STABILITY: SOCIAL INSECURITY: ABUSE: ADULT

## 2024-06-11 SDOH — HEALTH STABILITY: MENTAL HEALTH
HOW OFTEN DO YOU NEED TO HAVE SOMEONE HELP YOU WHEN YOU READ INSTRUCTIONS, PAMPHLETS, OR OTHER WRITTEN MATERIAL FROM YOUR DOCTOR OR PHARMACY?: NEVER

## 2024-06-11 SDOH — ECONOMIC STABILITY: INCOME INSECURITY: IN THE LAST 12 MONTHS, WAS THERE A TIME WHEN YOU WERE NOT ABLE TO PAY THE MORTGAGE OR RENT ON TIME?: NO

## 2024-06-11 SDOH — ECONOMIC STABILITY: FOOD INSECURITY: WITHIN THE PAST 12 MONTHS, YOU WORRIED THAT YOUR FOOD WOULD RUN OUT BEFORE YOU GOT MONEY TO BUY MORE.: NEVER TRUE

## 2024-06-11 SDOH — ECONOMIC STABILITY: INCOME INSECURITY: HOW HARD IS IT FOR YOU TO PAY FOR THE VERY BASICS LIKE FOOD, HOUSING, MEDICAL CARE, AND HEATING?: NOT HARD AT ALL

## 2024-06-11 SDOH — HEALTH STABILITY: PHYSICAL HEALTH: ON AVERAGE, HOW MANY MINUTES DO YOU ENGAGE IN EXERCISE AT THIS LEVEL?: 30 MIN

## 2024-06-11 SDOH — ECONOMIC STABILITY: FOOD INSECURITY: WITHIN THE PAST 12 MONTHS, THE FOOD YOU BOUGHT JUST DIDN'T LAST AND YOU DIDN'T HAVE MONEY TO GET MORE.: NEVER TRUE

## 2024-06-11 SDOH — SOCIAL STABILITY: SOCIAL INSECURITY: ARE THERE ANY APPARENT SIGNS OF INJURIES/BEHAVIORS THAT COULD BE RELATED TO ABUSE/NEGLECT?: NO

## 2024-06-11 SDOH — SOCIAL STABILITY: SOCIAL INSECURITY: ARE YOU OR HAVE YOU BEEN THREATENED OR ABUSED PHYSICALLY, EMOTIONALLY, OR SEXUALLY BY ANYONE?: NO

## 2024-06-11 SDOH — SOCIAL STABILITY: SOCIAL INSECURITY: DO YOU FEEL UNSAFE GOING BACK TO THE PLACE WHERE YOU ARE LIVING?: NO

## 2024-06-11 SDOH — HEALTH STABILITY: PHYSICAL HEALTH: ON AVERAGE, HOW MANY DAYS PER WEEK DO YOU ENGAGE IN MODERATE TO STRENUOUS EXERCISE (LIKE A BRISK WALK)?: 7 DAYS

## 2024-06-11 SDOH — SOCIAL STABILITY: SOCIAL INSECURITY: WERE YOU ABLE TO COMPLETE ALL THE BEHAVIORAL HEALTH SCREENINGS?: YES

## 2024-06-11 SDOH — ECONOMIC STABILITY: HOUSING INSECURITY: IN THE LAST 12 MONTHS, HOW MANY PLACES HAVE YOU LIVED?: 1

## 2024-06-11 SDOH — SOCIAL STABILITY: SOCIAL INSECURITY: HAVE YOU HAD THOUGHTS OF HARMING ANYONE ELSE?: NO

## 2024-06-11 SDOH — SOCIAL STABILITY: SOCIAL INSECURITY: DOES ANYONE TRY TO KEEP YOU FROM HAVING/CONTACTING OTHER FRIENDS OR DOING THINGS OUTSIDE YOUR HOME?: NO

## 2024-06-11 SDOH — SOCIAL STABILITY: SOCIAL INSECURITY: HAVE YOU HAD ANY THOUGHTS OF HARMING ANYONE ELSE?: NO

## 2024-06-11 SDOH — SOCIAL STABILITY: SOCIAL INSECURITY: HAS ANYONE EVER THREATENED TO HURT YOUR FAMILY OR YOUR PETS?: NO

## 2024-06-11 SDOH — SOCIAL STABILITY: SOCIAL INSECURITY: DO YOU FEEL ANYONE HAS EXPLOITED OR TAKEN ADVANTAGE OF YOU FINANCIALLY OR OF YOUR PERSONAL PROPERTY?: NO

## 2024-06-11 ASSESSMENT — COGNITIVE AND FUNCTIONAL STATUS - GENERAL
DAILY ACTIVITIY SCORE: 24
MOBILITY SCORE: 24
DAILY ACTIVITIY SCORE: 24
PATIENT BASELINE BEDBOUND: NO
MOBILITY SCORE: 24

## 2024-06-11 ASSESSMENT — ACTIVITIES OF DAILY LIVING (ADL)
HEARING - RIGHT EAR: FUNCTIONAL
DRESSING YOURSELF: INDEPENDENT
BATHING: INDEPENDENT
ADEQUATE_TO_COMPLETE_ADL: YES
TOILETING: INDEPENDENT
JUDGMENT_ADEQUATE_SAFELY_COMPLETE_DAILY_ACTIVITIES: YES
PATIENT'S MEMORY ADEQUATE TO SAFELY COMPLETE DAILY ACTIVITIES?: YES
GROOMING: INDEPENDENT
HEARING - LEFT EAR: FUNCTIONAL
LACK_OF_TRANSPORTATION: NO
FEEDING YOURSELF: INDEPENDENT
WALKS IN HOME: INDEPENDENT

## 2024-06-11 ASSESSMENT — ENCOUNTER SYMPTOMS
NAUSEA: 0
MYALGIAS: 0
VOMITING: 0
COLOR CHANGE: 0
NUMBNESS: 0
ABDOMINAL PAIN: 0
UNEXPECTED WEIGHT CHANGE: 1
ENDOCRINE NEGATIVE: 1
ACTIVITY CHANGE: 1
FATIGUE: 1
WEAKNESS: 0
FEVER: 0
FACIAL SWELLING: 1
JOINT SWELLING: 0
PSYCHIATRIC NEGATIVE: 1
SHORTNESS OF BREATH: 0
DIARRHEA: 0
DIAPHORESIS: 0
CHEST TIGHTNESS: 0
CHILLS: 0
ABDOMINAL DISTENTION: 0
APPETITE CHANGE: 1
COUGH: 0
PALPITATIONS: 0
ARTHRALGIAS: 0
LIGHT-HEADEDNESS: 0
ADENOPATHY: 1
TROUBLE SWALLOWING: 0
WHEEZING: 0
SPEECH DIFFICULTY: 0
PHOTOPHOBIA: 0
HEADACHES: 0
BRUISES/BLEEDS EASILY: 0
SORE THROAT: 0
BLOOD IN STOOL: 0
CONSTIPATION: 0

## 2024-06-11 ASSESSMENT — PAIN SCALES - GENERAL
PAINLEVEL_OUTOF10: 0
PAINLEVEL_OUTOF10: 0 - NO PAIN
PAINLEVEL_OUTOF10: 0
PAINLEVEL_OUTOF10: 0 - NO PAIN

## 2024-06-11 ASSESSMENT — LIFESTYLE VARIABLES
SKIP TO QUESTIONS 9-10: 1
PRESCIPTION_ABUSE_PAST_12_MONTHS: NO
HOW OFTEN DO YOU HAVE 6 OR MORE DRINKS ON ONE OCCASION: NEVER
SUBSTANCE_ABUSE_PAST_12_MONTHS: NO
AUDIT-C TOTAL SCORE: 0
HOW OFTEN DO YOU HAVE A DRINK CONTAINING ALCOHOL: NEVER
AUDIT-C TOTAL SCORE: 0
HOW MANY STANDARD DRINKS CONTAINING ALCOHOL DO YOU HAVE ON A TYPICAL DAY: PATIENT DOES NOT DRINK

## 2024-06-11 ASSESSMENT — PATIENT HEALTH QUESTIONNAIRE - PHQ9
2. FEELING DOWN, DEPRESSED OR HOPELESS: NOT AT ALL
1. LITTLE INTEREST OR PLEASURE IN DOING THINGS: NOT AT ALL
SUM OF ALL RESPONSES TO PHQ9 QUESTIONS 1 & 2: 0

## 2024-06-11 ASSESSMENT — COLUMBIA-SUICIDE SEVERITY RATING SCALE - C-SSRS
6. HAVE YOU EVER DONE ANYTHING, STARTED TO DO ANYTHING, OR PREPARED TO DO ANYTHING TO END YOUR LIFE?: NO
1. IN THE PAST MONTH, HAVE YOU WISHED YOU WERE DEAD OR WISHED YOU COULD GO TO SLEEP AND NOT WAKE UP?: NO
2. HAVE YOU ACTUALLY HAD ANY THOUGHTS OF KILLING YOURSELF?: NO

## 2024-06-11 ASSESSMENT — PAIN - FUNCTIONAL ASSESSMENT: PAIN_FUNCTIONAL_ASSESSMENT: 0-10

## 2024-06-11 NOTE — HOSPITAL COURSE
Jailene Lacy is a 73 y.o. female with PMHx of HTN, HLD, presents to OSH with leukocytosis of 60k and diffuse lymphadenopathy of the face/neck who is being transferred to [facility] for further evaluation and treatment.

## 2024-06-11 NOTE — PROGRESS NOTES
Pharmacy Consultation Note    Consult date: 6/11/2024  Physician/provider: Dr. Lizarraga    Pharmacy consulted for rasburicase dosing. Per [facility] policy, rasburicase is dosed at a fixed dose of 3 mg. A repeat dose of 3 mg may be considered if uric acid remains > 7.5 mg/dL after more than 24 hours.     Initial uric acid 24.3 mg/dL. Repeat on 6/11 @ 0405 = 16.9 mg/dL (~ 5 hours after rasburicase administration). Repeat uric acid level ordered for 6/12 with AM labs to re-assess need to repeat rasburicase dose.     Lora Bowles, PharmD, BCPS 6/11/2024 12:54 PM   Ext: [phone]

## 2024-06-11 NOTE — PROGRESS NOTES
4 Eyes Skin Assessment     NAME:  Natasha Ferrari  YOB: 1950  MEDICAL RECORD NUMBER:  [id number]    The patient is being assessed for  Admission    I agree that at least one RN has performed a thorough Head to Toe Skin Assessment on the patient. ALL assessment sites listed below have been assessed.      Areas assessed by both nurses:    Head, Face, Ears, Shoulders, Back, Chest, Arms, Elbows, Hands, Sacrum. Buttock, Coccyx, Ischium, and Legs. Feet and Heels        Does the Patient have a Wound? No noted wound(s)       Elijah Prevention initiated by RN: Yes  Wound Care Orders initiated by RN: No    Pressure Injury (Stage 3,4, Unstageable, DTI, NWPT, and Complex wounds) if present, place Wound referral order by RN under : No    New Ostomies, if present place, Ostomy referral order under : No     Nurse 1 eSignature: Electronically signed by Sandeep Delgadillo RN on 6/11/24 at 12:44 AM EDT    **SHARE this note so that the co-signing nurse can place an eSignature**    Nurse 2 eSignature: Electronically signed by Heather Hanna RN on 6/11/24 at 12:47 AM EDT

## 2024-06-11 NOTE — PROGRESS NOTES
Pharmacy Medication History Review    Jailene Lacy is a 73 y.o. female admitted for Chronic myelomonocytic leukemia not having achieved remission (Multi). Pharmacy reviewed the patient's [id number] medications and allergies for accuracy.    The list below reflects the updated PTA list. Comments regarding how patient may be taking medications differently can be found in the Admit Orders Activity  Prior to Admission Medications   Prescriptions Last Dose Informant Patient Reported?   Veltassa 8.4 gram powder in packet  Self Yes   Sig: Take 8.4 g by mouth once daily.   cholecalciferol (Vitamin D-3) 50 mcg (2,000 unit) capsule  Self Yes   Sig: Take 1 capsule (50 mcg) by mouth early in the morning..   ferrous sulfate, 325 mg ferrous sulfate, tablet  Self Yes   Sig: Take 1 tablet by mouth 2 times a day.   hydroCHLOROthiazide (Microzide) 12.5 mg tablet  Self Yes   Sig: Take 1 tablet (12.5 mg) by mouth once daily.   lisinopril 10 mg tablet  Self Yes   Sig: Take 1 tablet (10 mg) by mouth once daily.   metoprolol tartrate (Lopressor) 75 mg tablet  Self Yes   Sig: Take 1 tablet (75 mg) by mouth 2 times a day.   multivitamin with minerals tablet  Self Yes   Sig: Take 1 tablet by mouth once daily.      Facility-Administered Medications: None        The list below reflects the updated allergy list. Please review each documented allergy for additional clarification and justification.  Allergies  Reviewed by Kamila Paige PharmD on 6/11/2024   No Known Allergies         Patient declines M2B at discharge. Pharmacy has been updated to [facility].    Sources used to complete the med history include: Patient interview - had list of medications/ Pharmacy - [facility]/ Chart review    Kamila Paige PharmD  Transitions of Care Pharmacist  [facility]  Please reach out via Secure Chat for questions, or if no response call [facility] or [facility]

## 2024-06-11 NOTE — PLAN OF CARE
Problem: Discharge Planning  Goal: Discharge to home or other facility with appropriate resources  6/11/2024 1416 by Anita Shah, RN  Outcome: Completed     Problem: Safety - Adult  Goal: Free from fall injury  6/11/2024 1416 by Anita Shah, RN  Outcome: Completed

## 2024-06-11 NOTE — H&P
[facility]   History and Physical      CHIEF COMPLAINT:  fatigue    History of Present Illness:  73 y.o. female with a history of HTN, HLD presents with fatigue and face/neck lumps.  Has also had a nonproductive cough.  Denies night sweats, fever/chills.  Did have some weight loss but states \"not that much.\"  Has had poor appetite.  Workup in ED significant for creatinine 2.8 (up from baseline 1.8), CO2 18, K 5.6, pro-BNP 1313, troponin 57 and 44, alk phos 117, , WBC 60.9, hgb 9.8, uric acid 24.3, LD > 2500, INR 1.4, fibrinogen 153, d-dimer 3022.  CT neck moderate diffuse bilateral cervical lymphadenopathy, 2.2cm left thyroid nodule.  CT chest prominent mediastinal and bilateral axillary lymphadenopathy, thickening inferior thoracic esophagus, posterior and lateral left lung base infiltrate.  CT abd/pelvis splenomegaly with extensive abdominal and pelvic lymph node enlargement, small to moderate free pelvic fluid, stable 4.1cm left adnexal cystic lesion.  Given 1 L bolus in ED.  Oncology c/s from ED, recommended transfer to [facility].  Patient is accepted at , pending bed.    Informant(s) for H&P: patient, chart    REVIEW OF SYSTEMS:  no fevers, chills, cp, sob, n/v, ha, vision/hearing changes, hot/cold flashes, other open skin lesions, diarrhea, constipation, dysuria/hematuria unless noted in HPI. Complete ROS performed with the patient and is otherwise negative.      PMH:  Past Medical History:   Diagnosis Date    Essential hypertension     Primary hypercholesterolemia 11/12/2021    Sepsis due to cellulitis (HCC) 9/22/2021       Surgical History:  Past Surgical History:   Procedure Laterality Date    ABDOMEN SURGERY N/A 9/23/2021    INCISION AND DRAINAGE ABDOMINAL WALL ABSCESS performed by Rolando Billings MD at [facility] OR    TONSILLECTOMY         Medications Prior to Admission:    Prior to Admission medications    Medication Sig Start Date End Date Taking?

## 2024-06-11 NOTE — H&P
History Of Present Illness  Jailene Lacy is a 73 y.o. female presenting from OSH with leukocytosis of 60k and diffuse lymphadenopathy with concern for acute leukemia/lymphoma.     She states that she noticed left mandibular mass slowly enlarge over the last 4 weeks, decreased appetite, with around 7 lbs weight loss. Denies easy bruising, HA, vision changes, CP, SOB, palpitations, N/V/D/C, flankpain/dysuria. Denies sick contacts.     Past Medical History  She has a past medical history of Cancer (Multi) and Hypertension.    Surgical History  She has a past surgical history that includes Tonsillectomy.    Oncology History    No history exists.        Social History  She has no history on file for tobacco use, alcohol use, and drug use.     Allergies  Patient has no known allergies.    Review of Systems   Constitutional:  Positive for activity change, appetite change, fatigue and unexpected weight change. Negative for chills, diaphoresis and fever.   HENT:  Positive for dental problem, facial swelling and mouth sores. Negative for sore throat, tinnitus and trouble swallowing.    Eyes:  Negative for photophobia and visual disturbance.   Respiratory:  Negative for cough, chest tightness, shortness of breath and wheezing.    Cardiovascular:  Negative for chest pain, palpitations and leg swelling.   Gastrointestinal:  Negative for abdominal distention, abdominal pain, blood in stool, constipation, diarrhea, nausea and vomiting.   Endocrine: Negative.    Genitourinary: Negative.    Musculoskeletal:  Negative for arthralgias, joint swelling and myalgias.   Skin:  Negative for color change and rash.   Allergic/Immunologic: Positive for immunocompromised state.   Neurological:  Negative for syncope, speech difficulty, weakness, light-headedness, numbness and headaches.   Hematological:  Positive for adenopathy. Does not bruise/bleed easily.   Psychiatric/Behavioral: Negative.          Physical Exam  Vitals and nursing note  reviewed.   Constitutional:       General: She is not in acute distress.     Appearance: Normal appearance. She is not toxic-appearing.   HENT:      Head: Normocephalic.      Comments: Bilateral mandibular lymphadenopathy L>R, bilateral cervical lymphadenopathy     Mouth/Throat:      Mouth: Mucous membranes are moist.      Dentition: Gingival swelling and dental caries present.      Tongue: Lesions (R tongue) present.   Eyes:      General: No scleral icterus.     Extraocular Movements: Extraocular movements intact.      Pupils: Pupils are equal, round, and reactive to light.   Cardiovascular:      Rate and Rhythm: Tachycardia present. Rhythm irregular.      Pulses: Normal pulses.      Heart sounds:      Gallop present.   Pulmonary:      Effort: Pulmonary effort is normal. No respiratory distress.      Breath sounds: Normal breath sounds. No wheezing, rhonchi or rales.   Abdominal:      General: Bowel sounds are normal.      Palpations: Abdomen is soft. There is splenomegaly.      Tenderness: There is no abdominal tenderness.   Musculoskeletal:         General: No swelling or tenderness. Normal range of motion.   Lymphadenopathy:      Cervical: Cervical adenopathy present.      Upper Body:      Right upper body: Supraclavicular adenopathy and axillary adenopathy present.      Left upper body: Supraclavicular adenopathy and axillary adenopathy present.   Skin:     General: Skin is warm and dry.      Capillary Refill: Capillary refill takes less than 2 seconds.   Neurological:      General: No focal deficit present.      Mental Status: She is alert and oriented to person, place, and time. Mental status is at baseline.      Sensory: No sensory deficit.      Motor: No weakness.   Psychiatric:         Mood and Affect: Mood normal.         Behavior: Behavior normal.         Thought Content: Thought content normal.         Judgment: Judgment normal.          Last Recorded Vitals  Blood pressure 121/72, pulse (!) 120,  "temperature 37.1 °C (98.8 °F), temperature source Temporal, resp. rate 20, height 1.694 m (5' 6.69\"), weight 56.6 kg (124 lb 12.5 oz), SpO2 96%.    Relevant Results           Assessment/Plan   Leukocytosis    Jailene Lacy is a 73 y.o. female with PMHx of HTN, HLD, presents to OSH with leukocytosis of 60k and diffuse lymphadenopathy of the face/neck who is being transferred to [facility] for further evaluation and treatment.     ONC/HEME:  #Leukocytosis, 60,000  - FLOW cytometry  - Smear  - PML/BARTOLO t(15;17), FISH  - Onco-Echo ordered on admit  - plan for BMBx 6/12/2024  - monitor counts daily, fibrinogen, coags  - no evidence of DIC or hypercoag state on admit  - TLS labs  #Anemia, chronic  -hold home ferrous sulfate     Ppx: lovenox 40mg daily    ID:  [id number]  -no evidence of active infection on admit  -covid swab on admit  -plan Zosyn/panculture/CXR if febrile    Ppx: acyclovir, pantoprazole     FEN/GI:  Admit weight: 56.6 kg  Low path diet  #YRN, baseline sCr ~1.4-1.8, sCr on admit: 2.8  -mIVF NS at 100mL/hr  #Hyperuricemia, 2/2 TLS  Uric acid 24.3mg/dL at OSH, s/p x1 dose rasburicase --> 16.9mg/dL  -allopurinol 300mg BID  -consider additional rasburicase  #Hyperkalemia, 2/2 TLS  -mIVF    CARDS:  #Hypertension  -hold lisinopril 10 mg in setting of YRN  -cont home metoprolol 75 mg BID  -cont home hctz 12.5 mg daily  -baseline EKG on admit      PSYCH:  #Anxiety  -Ativan 1mg q8h PRN    DISPO:  Full Code  PIV on admit  Blood consent on admit  Needs primary oncologist    Patient seen, examined, and discussed with Dr. Praveen Padilla.       Sky Gracia, APRN-CNP    "

## 2024-06-11 NOTE — CARE COORDINATION
Case Management Assessment  Initial Evaluation    Date/Time of Evaluation: 6/11/2024 2:45 PM  Assessment Completed by: Portia Pfeiffer RN    If patient is discharged prior to next notation, then this note serves as note for discharge by case management.    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 Yes    Chart Reviewed: Yes      History Provided by: Patient  Patient Orientation: Alert and Oriented    Patient Cognition: Alert    Hospitalization in the last 30 days (Readmission):  No    If yes, Readmission Assessment in  Navigator will be completed.    Advance Directives:      Code Status: Full Code   Patient's Primary Decision Maker is: Patient Declined (Legal Next of Kin Remains as Decision Maker)    Primary Decision Maker: Davion Murphy - Domestic Partner - [phone]    Discharge Planning:    Patient lives with: Spouse/Significant Other Type of Home: House  Primary Care Giver: Self  Patient Support Systems include: Spouse/Significant Other   ADLS  Prior functional level: Independent in ADLs/IADLs  Current functional level: Independent in ADLs/IADLs      Family can provide assistance at DC: Yes  Would you like Case Management to discuss the discharge plan with any other family members/significant others, and if so, who? No  Plans to Return to Present Housing: Yes  Other Identified Issues/Barriers to RETURNING to current housing: none   Financial    Payor: [facility] MEDICARE / Plan: [facility] MEDICARE COMPLETE / Product Type: *No Product type* /         [id number] - [place], [place] - [address] - P [phone] - F [phone]  [address]  [facility] [address]  Phone: [phone] Fax:

## 2024-06-11 NOTE — ED NOTES
Attempted to call report- no answer   Lab Results   Component Value Date    CREATININE 1 46 (H) 01/11/2019    CREATININE 1 43 (H) 01/10/2019    CREATININE 1 32 (H) 01/09/2019    CREATININE 1 33 (H) 01/08/2019     · Creatinine at baseline 0 9-1 1  · Currently 1 46    · Trend creatinine daily while on diuresis  May need to accept higher creatinine to get euvolemic but has been trending upward, losartan on hold     · UA with trace protein

## 2024-06-11 NOTE — CARE PLAN
Problem: Pain  Goal: My pain/discomfort is manageable  Outcome: Progressing     Problem: Safety  Goal: Patient will be injury free during hospitalization  Outcome: Progressing  Goal: I will remain free of falls  Outcome: Progressing     Problem: Daily Care  Goal: Daily care needs are met  Outcome: Progressing     Problem: Psychosocial Needs  Goal: Demonstrates ability to cope with hospitalization/illness  Outcome: Progressing  Goal: Collaborate with me, my family, and caregiver to identify my specific goals  Outcome: Progressing  Flowsheets (Taken 6/11/2024 7451)  Cultural Requests During Hospitalization: none  Spiritual Requests During Hospitalization: none     Problem: Discharge Barriers  Goal: My discharge needs are met  Outcome: Progressing     Problem: Infection related to problem list condition  Goal: Infection will resolve through treatment  Outcome: Progressing     Problem: Skin  Goal: Decreased wound size/increased tissue granulation at next dressing change  Outcome: Progressing  Goal: Participates in plan/prevention/treatment measures  Outcome: Progressing  Goal: Prevent/manage excess moisture  Outcome: Progressing  Goal: Prevent/minimize sheer/friction injuries  Outcome: Progressing  Goal: Promote/optimize nutrition  Outcome: Progressing  Goal: Promote skin healing  Outcome: Progressing     Problem: Skin  Goal: Decreased wound size/increased tissue granulation at next dressing change  Outcome: Progressing  Goal: Participates in plan/prevention/treatment measures  Outcome: Progressing  Goal: Prevent/manage excess moisture  Outcome: Progressing  Goal: Prevent/minimize sheer/friction injuries  Outcome: Progressing  Goal: Promote/optimize nutrition  Outcome: Progressing  Goal: Promote skin healing  Outcome: Progressing     Problem: Fall/Injury  Goal: Not fall by end of shift  Outcome: Progressing  Goal: Be free from injury by end of the shift  Outcome: Progressing  Goal: Verbalize understanding of personal  risk factors for fall in the hospital  Outcome: Progressing  Goal: Verbalize understanding of risk factor reduction measures to prevent injury from fall in the home  Outcome: Progressing  Goal: Use assistive devices by end of the shift  Outcome: Progressing  Goal: Pace activities to prevent fatigue by end of the shift  Outcome: Progressing   The patient's goals for the shift include      The clinical goals for the shift include gett the biopsy    Pt remains stable and labs pending

## 2024-06-11 NOTE — CONSULTS
Nephrology Consult  [facility]    CC:   Acute kidney injury on chronic kidney disease stage IV with hyperkalemia and tumor lysis    HPI:   Patient is a 73-year-old female patient we are asked to see in regards to acute kidney injury on chronic kidney disease stage IV.  Patient has been followed longitudinally in our office and was last seen in April of this year.  At that time her noted baseline serum creatinine was 1.4 to 1.8 mg/dL.  The patient spends winter in [place] and had just returned from her winter stay in [place].  She presented to the emergency department for complaints of fatigue just not feeling well.  Her creatinine at presentation was 2.8 mg/dL with a uric acid of 24.3 mg/dL and a potassium of 5.6 mmol/L.  Additionally her CO2 was 18 mmol/L and trended down to 16 mmol/L.  Her LD was noted to be greater than 2,500 and her white blood cell count was 60.9.  She underwent multiple scans including soft tissue neck for thyroid which showed bilateral thyroid nodules.  Additionally on her CT of her chest showed prominent mediastinal and bilateral axillary lymphadenopathy concerning for lymphoma or metastatic disease.  Patient has had no such diagnosis in the past and this is all new to her.  Patient had been maintained in the outpatient setting on Veltassa for her chronic hyperkalemia.  She does admit to some missed doses as she does not tolerate it well and does not seem to mix well for her.      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)    Normocytic normochromic anemia    CKD (chronic kidney disease) stage 4, GFR 15-29 ml/min (HCC)    Tumor lysis syndrome    Thyroid nodule    Acute kidney injury  superimposed on CKD (HCC)    Hyperkalemia    Elevated d-dimer       Meds:     sodium chloride flush  5-40 mL IntraVENous 2 times per day    enoxaparin  30 mg SubCUTAneous Daily        sodium bicarbonate 150 mEq in dextrose 5 % 1,000 mL infusion 125 mL/hr at 06/11/24 1222    sodium chloride         Meds prn:     sodium chloride flush, sodium chloride, acetaminophen **OR** acetaminophen    Meds prior to admission:     No current facility-administered medications on file prior to encounter.     Current Outpatient Medications on File Prior to Encounter   Medication Sig Dispense Refill    Metoprolol Tartrate 75 MG TABS Take 1 tablet by mouth 2 times daily 180 tablet 3    VELTASSA 8.4 g PACK packet Take 1 packet by mouth daily      ferrous sulfate (FEROSUL) 325 (65 Fe) MG tablet Take 1 tablet by mouth 2 times daily 180 tablet 0    Cholecalciferol (VITAMIN D3) 50 MCG (2000 UT) CAPS Take 1 capsule by mouth daily      Multiple Vitamins-Minerals (CENTRUM SILVER 50+WOMEN) TABS Take 1 tablet by mouth Daily with supper         Allergies:    Patient has no known allergies.    Social History:     reports that she has never smoked. She has never used smokeless tobacco. She reports current alcohol use. She reports that she does not use drugs.    Family History:         Problem Relation Age of Onset    Breast Cancer Mother     Dementia Father     Dementia Maternal Grandmother        ROS:     All pertinent positives discussed in HPI  All other sx negative     Physical Exam:      Patient Vitals for the past 24 hrs:   BP Temp Temp src Pulse Resp SpO2 Height Weight   06/11/24 1004 (!) 114/57 -- -- (!) 125 17 95 % -- --   06/11/24 0814 97/63 98.7 °F (37.1 °C) Oral (!) 126 21 97 % -- --   06/11/24 0600 -- -- -- -- -- -- -- 55.3 kg (121 lb 14.4 oz)   06/11/24 0412 (!) 98/59 97.9 °F (36.6 °C) Oral (!) 118 20 98 % -- --   06/11/24 0013 (!) 89/67 98.4 °F (36.9 °C) Oral (!) 122 20 97 % -- --   06/10/24 2230 (!) 107/57 98.3 °F (36.8 °C) Oral (!)

## 2024-06-12 ENCOUNTER — APPOINTMENT (OUTPATIENT)
Dept: RADIOLOGY | Facility: HOSPITAL | Age: 74
DRG: 840 | End: 2024-06-12
Payer: MEDICARE

## 2024-06-12 ENCOUNTER — APPOINTMENT (OUTPATIENT)
Dept: CARDIOLOGY | Facility: HOSPITAL | Age: 74
DRG: 840 | End: 2024-06-12
Payer: MEDICARE

## 2024-06-12 PROBLEM — E88.3 TUMOR LYSIS SYNDROME (HHS-HCC): Status: ACTIVE | Noted: 2024-06-12

## 2024-06-12 PROBLEM — I10 HYPERTENSION: Status: ACTIVE | Noted: 2024-06-12

## 2024-06-12 PROBLEM — C95.00 ACUTE LEUKEMIA (MULTI): Status: ACTIVE | Noted: 2024-06-11

## 2024-06-12 LAB
ALBUMIN SERPL BCP-MCNC: 2.9 G/DL (ref 3.4–5)
ALBUMIN SERPL BCP-MCNC: 3 G/DL (ref 3.4–5)
ANION GAP SERPL CALC-SCNC: 12 MMOL/L (ref 10–20)
ANION GAP SERPL CALC-SCNC: 13 MMOL/L (ref 10–20)
AORTIC VALVE PEAK VELOCITY: 1.34 M/S
AV PEAK GRADIENT: 7.2 MMHG
AVA (PEAK VEL): 2.37 CM2
BASOPHILS # BLD MANUAL: 0.4 X10*3/UL (ref 0–0.1)
BASOPHILS NFR BLD MANUAL: 0.8 %
BUN SERPL-MCNC: 38 MG/DL (ref 6–23)
BUN SERPL-MCNC: 41 MG/DL (ref 6–23)
CALCIUM SERPL-MCNC: 8.2 MG/DL (ref 8.6–10.6)
CALCIUM SERPL-MCNC: 8.3 MG/DL (ref 8.6–10.6)
CELL COUNT (BLOOD): 57.9 X10*3/UL
CELL POPULATIONS: NORMAL
CHLORIDE SERPL-SCNC: 108 MMOL/L (ref 98–107)
CHLORIDE SERPL-SCNC: 109 MMOL/L (ref 98–107)
CO2 SERPL-SCNC: 20 MMOL/L (ref 21–32)
CO2 SERPL-SCNC: 20 MMOL/L (ref 21–32)
CREAT SERPL-MCNC: 1.67 MG/DL (ref 0.5–1.05)
CREAT SERPL-MCNC: 1.73 MG/DL (ref 0.5–1.05)
DIAGNOSIS: NORMAL
EGFRCR SERPLBLD CKD-EPI 2021: 31 ML/MIN/1.73M*2
EGFRCR SERPLBLD CKD-EPI 2021: 33 ML/MIN/1.73M*2
EJECTION FRACTION APICAL 4 CHAMBER: 73.6
EOSINOPHIL # BLD MANUAL: 0 X10*3/UL (ref 0–0.4)
EOSINOPHIL NFR BLD MANUAL: 0 %
ERYTHROCYTE [DISTWIDTH] IN BLOOD BY AUTOMATED COUNT: 14.9 % (ref 11.5–14.5)
ERYTHROCYTE [DISTWIDTH] IN BLOOD BY AUTOMATED COUNT: 15 % (ref 11.5–14.5)
FIBRINOGEN PPP-MCNC: 198 MG/DL (ref 200–400)
FLOW DIFFERENTIAL: NORMAL
FLOW TEST ORDERED: NORMAL
GLOBAL LONGITUDINAL STRAIN: 17.2 %
GLUCOSE SERPL-MCNC: 101 MG/DL (ref 74–99)
GLUCOSE SERPL-MCNC: 93 MG/DL (ref 74–99)
HCT VFR BLD AUTO: 25.9 % (ref 36–46)
HCT VFR BLD AUTO: 27 % (ref 36–46)
HGB BLD-MCNC: 8.2 G/DL (ref 12–16)
HGB BLD-MCNC: 8.4 G/DL (ref 12–16)
IMM GRANULOCYTES # BLD AUTO: 0.8 X10*3/UL (ref 0–0.5)
IMM GRANULOCYTES NFR BLD AUTO: 1.6 % (ref 0–0.9)
LAB TEST METHOD: NORMAL
LDH SERPL L TO P-CCNC: 4085 U/L (ref 84–246)
LDH SERPL L TO P-CCNC: 4106 U/L (ref 84–246)
LEFT VENTRICLE INTERNAL DIMENSION DIASTOLE: 4 CM (ref 3.5–6)
LEFT VENTRICULAR OUTFLOW TRACT DIAMETER: 1.9 CM
LV EJECTION FRACTION BIPLANE: 74 %
LYMPHOCYTES # BLD MANUAL: 9.07 X10*3/UL (ref 0.8–3)
LYMPHOCYTES NFR BLD MANUAL: 18.1 %
MAGNESIUM SERPL-MCNC: 1.67 MG/DL (ref 1.6–2.4)
MCH RBC QN AUTO: 28.8 PG (ref 26–34)
MCH RBC QN AUTO: 28.9 PG (ref 26–34)
MCHC RBC AUTO-ENTMCNC: 31.1 G/DL (ref 32–36)
MCHC RBC AUTO-ENTMCNC: 31.7 G/DL (ref 32–36)
MCV RBC AUTO: 91 FL (ref 80–100)
MCV RBC AUTO: 93 FL (ref 80–100)
METAMYELOCYTES # BLD MANUAL: 0.4 X10*3/UL
METAMYELOCYTES NFR BLD MANUAL: 0.8 %
MITRAL VALVE E/A RATIO: 1.29
MITRAL VALVE E/E' RATIO: 6.18
MONOCYTES # BLD MANUAL: 0.4 X10*3/UL (ref 0.05–0.8)
MONOCYTES NFR BLD MANUAL: 0.8 %
NEUTS SEG # BLD MANUAL: 13.13 X10*3/UL (ref 1.6–5)
NEUTS SEG NFR BLD MANUAL: 26.2 %
NRBC BLD-RTO: 0.1 /100 WBCS (ref 0–0)
NRBC BLD-RTO: 0.1 /100 WBCS (ref 0–0)
NUMBER OF CELLS COLLECTED: NORMAL PER TUBE
PATH REPORT.TOTAL CANCER: NORMAL
PATH REVIEW-CBC DIFFERENTIAL: NORMAL
PHOSPHATE SERPL-MCNC: 2.8 MG/DL (ref 2.5–4.9)
PHOSPHATE SERPL-MCNC: 3 MG/DL (ref 2.5–4.9)
PLATELET # BLD AUTO: 144 X10*3/UL (ref 150–450)
PLATELET # BLD AUTO: 150 X10*3/UL (ref 150–450)
POTASSIUM SERPL-SCNC: 4.4 MMOL/L (ref 3.5–5.3)
POTASSIUM SERPL-SCNC: 4.5 MMOL/L (ref 3.5–5.3)
RBC # BLD AUTO: 2.84 X10*6/UL (ref 4–5.2)
RBC # BLD AUTO: 2.92 X10*6/UL (ref 4–5.2)
RBC MORPH BLD: ABNORMAL
RIGHT VENTRICLE FREE WALL PEAK S': 19.7 CM/S
RIGHT VENTRICLE PEAK SYSTOLIC PRESSURE: 29.4 MMHG
SIGNATURE COMMENT: NORMAL
SODIUM SERPL-SCNC: 136 MMOL/L (ref 136–145)
SODIUM SERPL-SCNC: 137 MMOL/L (ref 136–145)
SPECIMEN VIABILITY: NORMAL
TOTAL CELLS COUNTED BLD: 122
TRICUSPID ANNULAR PLANE SYSTOLIC EXCURSION: 2.2 CM
URATE SERPL-MCNC: 8.1 MG/DL (ref 2.3–6.7)
URATE SERPL-MCNC: 8.9 MG/DL (ref 2.3–6.7)
VARIANT LYMPHS # BLD MANUAL: 26.7 X10*3/UL (ref 0–0.3)
VARIANT LYMPHS NFR BLD: 53.3 %
WBC # BLD AUTO: 44 X10*3/UL (ref 4.4–11.3)
WBC # BLD AUTO: 50.1 X10*3/UL (ref 4.4–11.3)

## 2024-06-12 PROCEDURE — 36415 COLL VENOUS BLD VENIPUNCTURE: CPT | Performed by: PHYSICIAN ASSISTANT

## 2024-06-12 PROCEDURE — 76376 3D RENDER W/INTRP POSTPROCES: CPT | Performed by: INTERNAL MEDICINE

## 2024-06-12 PROCEDURE — 2500000004 HC RX 250 GENERAL PHARMACY W/ HCPCS (ALT 636 FOR OP/ED)

## 2024-06-12 PROCEDURE — 84550 ASSAY OF BLOOD/URIC ACID: CPT | Mod: 91

## 2024-06-12 PROCEDURE — 85027 COMPLETE CBC AUTOMATED: CPT | Mod: 91

## 2024-06-12 PROCEDURE — 88271 CYTOGENETICS DNA PROBE: CPT

## 2024-06-12 PROCEDURE — 88271 CYTOGENETICS DNA PROBE: CPT | Mod: 91

## 2024-06-12 PROCEDURE — 93356 MYOCRD STRAIN IMG SPCKL TRCK: CPT

## 2024-06-12 PROCEDURE — 36569 INSJ PICC 5 YR+ W/O IMAGING: CPT

## 2024-06-12 PROCEDURE — 80069 RENAL FUNCTION PANEL: CPT

## 2024-06-12 PROCEDURE — 07DR3ZX EXTRACTION OF ILIAC BONE MARROW, PERCUTANEOUS APPROACH, DIAGNOSTIC: ICD-10-PCS | Performed by: INTERNAL MEDICINE

## 2024-06-12 PROCEDURE — 99233 SBSQ HOSP IP/OBS HIGH 50: CPT | Performed by: INTERNAL MEDICINE

## 2024-06-12 PROCEDURE — 2500000001 HC RX 250 WO HCPCS SELF ADMINISTERED DRUGS (ALT 637 FOR MEDICARE OP)

## 2024-06-12 PROCEDURE — 83615 LACTATE (LD) (LDH) ENZYME: CPT | Performed by: PHYSICIAN ASSISTANT

## 2024-06-12 PROCEDURE — 2720000007 HC OR 272 NO HCPCS

## 2024-06-12 PROCEDURE — 88185 FLOWCYTOMETRY/TC ADD-ON: CPT | Mod: TC,91,MUE

## 2024-06-12 PROCEDURE — 85027 COMPLETE CBC AUTOMATED: CPT

## 2024-06-12 PROCEDURE — 02HV33Z INSERTION OF INFUSION DEVICE INTO SUPERIOR VENA CAVA, PERCUTANEOUS APPROACH: ICD-10-PCS | Performed by: INTERNAL MEDICINE

## 2024-06-12 PROCEDURE — 81371 HLA I & II TYPE VERIFY LR: CPT | Mod: OUT | Performed by: STUDENT IN AN ORGANIZED HEALTH CARE EDUCATION/TRAINING PROGRAM

## 2024-06-12 PROCEDURE — 36415 COLL VENOUS BLD VENIPUNCTURE: CPT

## 2024-06-12 PROCEDURE — 85384 FIBRINOGEN ACTIVITY: CPT | Performed by: PHYSICIAN ASSISTANT

## 2024-06-12 PROCEDURE — 85007 BL SMEAR W/DIFF WBC COUNT: CPT

## 2024-06-12 PROCEDURE — 88341 IMHCHEM/IMCYTCHM EA ADD ANTB: CPT | Mod: TC,91,SUR,MUE

## 2024-06-12 PROCEDURE — 1170000001 HC PRIVATE ONCOLOGY ROOM DAILY

## 2024-06-12 PROCEDURE — 93356 MYOCRD STRAIN IMG SPCKL TRCK: CPT | Performed by: INTERNAL MEDICINE

## 2024-06-12 PROCEDURE — 83615 LACTATE (LD) (LDH) ENZYME: CPT | Mod: 91

## 2024-06-12 PROCEDURE — 93306 TTE W/DOPPLER COMPLETE: CPT | Performed by: INTERNAL MEDICINE

## 2024-06-12 PROCEDURE — C1751 CATH, INF, PER/CENT/MIDLINE: HCPCS

## 2024-06-12 PROCEDURE — 84550 ASSAY OF BLOOD/URIC ACID: CPT | Performed by: PHYSICIAN ASSISTANT

## 2024-06-12 PROCEDURE — 83735 ASSAY OF MAGNESIUM: CPT

## 2024-06-12 PROCEDURE — 80069 RENAL FUNCTION PANEL: CPT | Mod: 91

## 2024-06-12 PROCEDURE — 81450 HL NEO GSAP 5-50DNA/DNA&RNA: CPT

## 2024-06-12 RX ORDER — DEXAMETHASONE 4 MG/1
4 TABLET ORAL EVERY 12 HOURS SCHEDULED
Status: DISCONTINUED | OUTPATIENT
Start: 2024-06-12 | End: 2024-06-15

## 2024-06-12 RX ORDER — LORAZEPAM 0.5 MG/1
0.5 TABLET ORAL EVERY 8 HOURS PRN
Status: DISCONTINUED | OUTPATIENT
Start: 2024-06-12 | End: 2024-06-17 | Stop reason: HOSPADM

## 2024-06-12 RX ORDER — LIDOCAINE HYDROCHLORIDE 10 MG/ML
5 INJECTION INFILTRATION; PERINEURAL ONCE
Status: DISCONTINUED | OUTPATIENT
Start: 2024-06-12 | End: 2024-06-17 | Stop reason: HOSPADM

## 2024-06-12 RX ORDER — MAGNESIUM SULFATE HEPTAHYDRATE 40 MG/ML
2 INJECTION, SOLUTION INTRAVENOUS ONCE
Status: COMPLETED | OUTPATIENT
Start: 2024-06-12 | End: 2024-06-12

## 2024-06-12 RX ORDER — ALLOPURINOL 300 MG/1
300 TABLET ORAL 2 TIMES DAILY
Status: DISCONTINUED | OUTPATIENT
Start: 2024-06-12 | End: 2024-06-17 | Stop reason: HOSPADM

## 2024-06-12 ASSESSMENT — COGNITIVE AND FUNCTIONAL STATUS - GENERAL
MOBILITY SCORE: 24
DAILY ACTIVITIY SCORE: 24
MOBILITY SCORE: 24

## 2024-06-12 ASSESSMENT — PAIN - FUNCTIONAL ASSESSMENT
PAIN_FUNCTIONAL_ASSESSMENT: 0-10

## 2024-06-12 ASSESSMENT — PAIN SCALES - GENERAL
PAINLEVEL_OUTOF10: 0 - NO PAIN

## 2024-06-12 ASSESSMENT — ACTIVITIES OF DAILY LIVING (ADL): LACK_OF_TRANSPORTATION: NO

## 2024-06-12 NOTE — PROGRESS NOTES
06/12/24 1800   Discharge Planning   Living Arrangements Spouse/significant other   Support Systems Spouse/significant other;Family members;Friends/neighbors   Type of Residence Private residence   Number of Stairs to Enter Residence 0   Number of Stairs Within Residence 0   Do you have animals or pets at home? Yes   Type of Animals or Pets dog   Home or Post Acute Services None   Patient expects to be discharged to: home NN   Does the patient need discharge transport arranged? No   Financial Resource Strain   How hard is it for you to pay for the very basics like food, housing, medical care, and heating? Not hard   Housing Stability   In the last 12 months, was there a time when you were not able to pay the mortgage or rent on time? N   In the last 12 months, how many places have you lived? 1   In the last 12 months, was there a time when you did not have a steady place to sleep or slept in a shelter (including now)? N   Transportation Needs   In the past 12 months, has lack of transportation kept you from medical appointments or from getting medications? no   In the past 12 months, has lack of transportation kept you from meetings, work, or from getting things needed for daily living? No     LSW met with pt and significant other at bedside to complete assessment. Pt admitted for work up of suspected new leukemia. Pt will need onc assigned and tx plan when dx is confirmed. Pt reports she lives near [facility] and hopes to do some of her follow up their locally as this is a far distance for them. Pt plans to return home with her significant other, both are retired and pt reports being independent and very active, declined any  HHC/DME use or needs. No discharge needs anticipated at this time. LSW to cont to follow.

## 2024-06-12 NOTE — CONSULTS
"Nutrition Initial Assessment:   Nutrition Assessment    --->Reason for Assessment: Admission nursing screening    Patient is a 73 y.o. female, who originally presented to [facility] with leukocytosis and diffuse lymphadenopathy, transferred to this facility for further work-up d/t c/f acute Leukemia v Lymphoma.    Nutrition History:  This service met with pt earlier today, was sitting up in bed at time of visit with her.    Tells appetite/desire to eat, has been decreased for the last several weeks. Does try to eat throughout the day because she knows she needs to, just never feels like eating. Typically eats ~2 meals/day, either breakfast and dinner or lunch dinner. Has also been consuming 1 bottle of some type of protein shake/day, though was unsure as to what the name/brand was.    Was visually noted with facial swelling, though denied any issues with chewing/swallowing. No n/v/d. Mentioned she tries to stay active at home, walks her dog regularly, rides her bike, and works in her yard.    Since admit, appetite has been the same here as what it typically is at home. No PO as of yet at time of visit with her, was awaiting family, \"they're brining me in a donut.\" Denied GI issues during visit this am.    --Vitamin/Herbal Supplement Use: Senior MVI daily  --Food Allergies/Intolerances: none       Anthropometrics:  Height: 169.4 cm (5' 6.69\")   Weight: 56.6 kg (124 lb 12.5 oz)   BMI (Calculated): 19.72  IBW/kg (Dietitian Calculated): 61.4 kg  Percent of IBW: 92 %       Weight History:   Pt thinks she has lost ~8 lb over the last several weeks. Reports her UBW is ~132-135 lb, prior to the wt losses.    Weight History, per review of EMR:  02/03/23=58.2 kg  05/05/23=57.3 kg  08/11/23=58.2 kg  10/17/23=59.1 kg  04/10/24=60.0 kg --> 5.6% wt loss from this date to present (not significant)  06/10/24=59.9 kg -- possible stated wt?  06/11/24=56.6 kg -- current wt (standing scale)    Nutrition Focused Physical Exam " Findings:  Subcutaneous Fat Loss:   Orbital Fat Pads: Mild-Moderate (slight dark circles and slight hollowing)  Buccal Fat Pads: Mild-Moderate (flat cheeks, minimal bounce)  Triceps: Mild-Moderate (less than ample fat tissue)  Ribs: Defer  Muscle Wasting:  Temporalis: Mild-Moderate (slight depression)  Pectoralis (Clavicular Region): Mild-Moderate (some protrusion of clavicle)  Deltoid/Trapezius: Mild-Moderate (slight protrusion of acromion process)  Interosseous: Mild-Moderate (slightly depressed area between thumb and forefinger)  Trapezius/Infraspinatus/Supraspinatus (Scapular Region): Mild-Moderate (slight protrusion of scapula)  Quadriceps: Mild-Moderate (mild depression on inner and outer thigh)  Gastrocnemius: Mild-Moderate (not well developed muscle)  Edema:  Edema: none  Physical Findings:  Hair: Negative  Eyes: Negative  Mouth: Positive (noted with facial swelling/mass towards mouth on R side)  Nails: Negative  Skin: Negative    Nutrition Significant Labs:  CBC Trend:   Results from last 7 days   Lab Units 06/12/24  0923 06/11/24  1717   WBC AUTO x10*3/uL 50.1* 57.9*   RBC AUTO x10*6/uL 2.84* 3.32*   HEMOGLOBIN g/dL 8.2* 9.6*   HEMATOCRIT % 25.9* 30.5*   MCV fL 91 92   PLATELETS AUTO x10*3/uL 150 179   BMP Trend:   Results from last 7 days   Lab Units 06/12/24  0923 06/11/24  1717   GLUCOSE mg/dL 93 87   CALCIUM mg/dL 8.3* 9.0   SODIUM mmol/L 137 139   POTASSIUM mmol/L 4.5 4.5   CO2 mmol/L 20* 23   CHLORIDE mmol/L 109* 106   BUN mg/dL 41* 44*   CREATININE mg/dL 1.73* 1.88*   Liver Function Trend:   Results from last 7 days   Lab Units 06/11/24  1717   ALK PHOS U/L 102   AST U/L 158*   ALT U/L 28   BILIRUBIN TOTAL mg/dL 0.6   Renal Lab Trend:   Results from last 7 days   Lab Units 06/12/24  0923 06/11/24  1717   POTASSIUM mmol/L 4.5 4.5   PHOSPHORUS mg/dL 3.0 2.5   SODIUM mmol/L 137 139   MAGNESIUM mg/dL 1.67 1.83   EGFR mL/min/1.73m*2 31* 28*   BUN mg/dL 41* 44*   CREATININE mg/dL 1.73* 1.88*      Medications:  Scheduled medications  acyclovir, 400 mg, oral, q12h ADDI  allopurinol, 300 mg, oral, BID  heparin (porcine), 5,000 Units, subcutaneous, q8h  hydroCHLOROthiazide, 12.5 mg, oral, Daily  hydroxyurea, 500 mg, oral, q8h ADDI  lidocaine, 5 mL, infiltration, Once  magnesium sulfate, 2 g, intravenous, Once  metoprolol tartrate, 75 mg, oral, BID  pantoprazole, 40 mg, oral, Daily before breakfast  perflutren protein A microsphere, 0.5 mL, intravenous, Once in imaging  sulfur hexafluoride microsphr, 2 mL, intravenous, Once in imaging    Continuous medications  sodium chloride 0.9%, 100 mL/hr, Last Rate: 100 mL/hr (06/11/24 1848)    PRN medications  PRN medications: alteplase, alteplase, LORazepam    I/O:   --no documented BM yet, since admit    Dietary Orders (From admission, onward)       Start     Ordered    06/12/24 1013  Oral nutritional supplements  Until discontinued        Comments: please provide vanilla Ensure Plus once/day--may have more than once/day, if requested   Question Answer Comment   Deliver with Lunch    Select supplement: Ensure Plus        06/12/24 1013    06/11/24 1612  Adult diet Low pathogen  Diet effective now        Question:  Diet type  Answer:  Low pathogen    06/11/24 1611                Estimated Needs:   Total Energy Estimated Needs (kCal): 1700+  Method for Estimating Needs: 57 x ~30+  Total Protein Estimated Needs (g): 70+  Method for Estimating Needs: 57 x ~1.2g/kg+  Total Fluid Estimated Needs (mL): per MD/team        Nutrition Diagnosis   Malnutrition Diagnosis  Patient has Malnutrition Diagnosis: No--Pt reports appetite has been decreased at home, though is still eating and consuming a protein shake + no significant wt losses noted at this time. Mild/moderate muscle and adipose losses, likely at least in part, a factor of age. Is at risk for malnutrition, pending ongoing PO and wt status in-house.    Nutrition Diagnosis  Patient has Nutrition Diagnosis: Yes  Diagnosis  Status (1): New  Nutrition Diagnosis 1: Increased nutrient needs  Related to (1): increased metabolic demand  As Evidenced by (1): pt with possible new Leukemia v Lymphoma    Additional Assessment Information: Do feel, considering pt with report of decreased PO + likely new diagnosis of CA, pt would benefit from use of oral nutrition supplements in-house; discussed with her, agreeable to vanilla Ensure Plus daily.       Nutrition Interventions/Recommendations     1. Continue Low Pathogen Diet, only as tolerated  --RDN placed order for Ensure Plus daily for added nutrition        Nutrition Prescription for Oral Nutrition: 1700+ kcals/day, 70+ g pro/day        Nutrition Interventions:   Interventions: Meals and snacks, Medical food supplement  Meals and Snacks: General healthful diet  Goal: Low Pathogen Diet  Medical Food Supplement: Commercial beverage  Goal: Ensure Plus daily (350 kcals, 13 g pro each)    Nutrition Education: Encouraged ongoing PO, as tolerated. Reviewed role of oral nutrition supplements in the daily diet + went over various types offered at this facility. Pt verbalized understanding, denied any particular questions for RDN at this time.       Nutrition Monitoring and Evaluation   Food/Nutrient Related History Monitoring  Monitoring and Evaluation Plan: Amount of food  Amount of Food: Estimated amout of food, Medical food intake  Criteria: consume >50% of 2-3 meals/day + snacks throughout the day + 100% of 1 carton of Ensure Plus/day    Body Composition/Growth/Weight History  Monitoring and Evaluation Plan: Weight  Weight: Measured weight  Criteria: maintain stable wt    Biochemical Data, Medical Tests and Procedures  Monitoring and Evaluation Plan: Electrolyte/renal panel  Electrolyte and Renal Panel: Magnesium, Phosphorus, Potassium, Sodium  Criteria: WNL    Time Spent/Follow-up Reminder:   Time Spent (min): 60 minutes  Last Date of Nutrition Visit: 06/12/24  Nutrition Follow-Up Needed?:  Dietitian to reassess per policy  Follow up Comment: PO diet

## 2024-06-12 NOTE — POST-PROCEDURE NOTE
Pre-Procedure Checklist:  Emergent Line Insertion: No  Type of Line to be Placed: PICC  Consent Obtained: Yes  Emergency Medication Necessary: No  Patient Identified with 2 Independent Identifiers: Yes  Review of Allergies, Anticoagulation, Relevant Labs, ECG/Telemetry: Yes  Risks/Benefits/Alternatives Discussed with Patient/POA/Legal Representative: Yes  Stop Sign on Door: Yes  Time Out Performed: Yes  Catheter Exchange: No    Positioning Checklist:  All People, Including Patient, in the Room with Cap and Mask: Yes  Fluoroscopy Used to Identify Vessel and Guide Insertion: No   Sterile Cover Used: Yes  Full Barrier Precautions Followed (Mask, Cap, Gown, Gloves): Yes  Hands Washed: Yes  Monitors Attached with Sound Alarms On: No  Full Body Sterile Drape (Head-to-Toe) Used to Cover Patient: Yes  Trendelenburg Position (For IJ and Subclavian): No  CHG Skin Prep Used and Allowed to Air Dry to Skin Procedure: Yes    Procedure Checklist:  Blood Aspirated From All Lumens, All Ports Subsequently Flushed: Yes  Catheter Caps Placed on All Lumens; Lumens Clamped: Yes  Maintain Guidewire Control Throughout, Ensuring Guidewire Removal: Yes  Maintain Sterile Field Throughout Insertion: Yes  Catheter Secured: Yes  Confirmatory Test of Venous Placement: Non-Pulsatile Blood    Post Procedure Checklist:  Date and Time Written on Dressing: Yes  Sharp and Wire Count and Safe Disposal of all Sharps/Wires: Yes  Sterile Dressing Applied Per Protocol: Yes  X-ray Ordered or ECG Image: Yes    PICC Insertion Details:  Size (Fr): 4  Lumen Type:  Catheter to Vein Ratio Less Than 50%: Yes  Total Length (cm): 36  External Length (cm): 0  Orientation:  Right   Location: Brachial   Site Prep: Chlorohexidine; Usual sterile procedure followed  Local Anesthetic: Injectable/Subcutaneous  Indication: Hemodynamic Monitoring, IV infusion   Insertion Team Members in the Room: Nurseliban LPN  Initial Extremity Circumference (cm): 26.5  Insertion  Attempts: 1  Patient Tolerance: Tolerated Well, Age Appropriate  Comfort Measures: Subcutaneous anesthetic; Verbal  Procedure Location: Bedside  Safety Measures: Patient specific safety measures addressed with Morenita RN  Estimated Blood Loss (mL): 0  Vessel Fully Compressible Proximally and Distally to Insertion Site: Yes  Brisk Blood Return Obtained and Line Draws Easily: Yes  Tip Location: Cavo Atrial Junction   Line Confirmation: ECG  Lot #: [id number]  : Bard  PICC Line Exp Date: 04/30/2025  Securement: Stat Lock  Post Procedure Checklist: Handoff with Morenita ALEJANDRO; Obtain all new IV tubing prior to use; Bed at lowest level and wheels locked; Line discharge information at bedside.  Additional Details: Line was inserted using Modified Seldinger's Technique.   Placed by: Prisca Sandhu RN   /

## 2024-06-12 NOTE — PROCEDURES
The procedure was explained and potential complications were reviewed with the patient including the risks for bleeding, infection, nerve damage, and discomfort at the bone marrow biopsy site. The patient was given time for questions regarding the procedure. The patient agreed to proceed, and electronic signed consent was obtained. The patient's most recent history and physical exam were reviewed, and relevant findings were noted. The patient's allergy history was reviewed. Next, a pre-procedure time out was performed to properly identify the patient and the procedure to be performed.      The patient was placed in the left lateral position and the right posterior superior iliac crest bone marrow biopsy site was identified and marked. Next, the skin at the marked bone marrow biopsy site was cleansed with Chlorhexidine solution & sterile drapes were placed. The skin, subcutaneous tissue, and periosteum below the marked bone marrow biopsy site were anesthetized using 10 mL of 1% lidocaine solution. Next, a Jamshidi needle was inserted at the marked biopsy site & slowly advanced into the posterior iliac crest. Marrow aspirate & core biopsy were obtained & sent to Pathology for review & testing.  Bone marrow core length was 12 mm. The needle was removed & sterile dressing was applied to the biopsy site.  The patient tolerated the procedure well without incident. Prior to discharge, the biopsy site was inspected & the patient was given oral post procedure care instructions.  Estimated blood loss was 3 mL.    Procedure was performed by Cristiano Saucedo MD.  Assistants included Yara Plata.    Cristiano Saucedo MD  Medical Oncology Fellow  6/12/2024

## 2024-06-12 NOTE — PROGRESS NOTES
Jailene Lacy is a 73 y.o. female on day 1 of admission presenting with Acute leukemia (Multi).    Subjective   Afebrile, VSS. Tearful and anxious this morning in regards to disease process. Patient otherwise states she slept well and feels great. Denies, HA, vision changes, CP, SOB, palpitations, N/V/D/C, flankpain/dysuria. ROS otherwise negative.       Objective   Physical Exam  Vitals and nursing note reviewed.   Constitutional:       General: She is not in acute distress.     Appearance: Normal appearance. She is not toxic-appearing.   HENT:      Head: Normocephalic.      Comments: Bilateral mandibular lymphadenopathy L>R, bilateral cervical lymphadenopathy     Mouth/Throat:      Mouth: Mucous membranes are moist.      Dentition: Gingival swelling and dental caries present.      Tongue: Lesions (R tongue) present.   Eyes:      General: No scleral icterus.     Extraocular Movements: Extraocular movements intact.      Pupils: Pupils are equal, round, and reactive to light.   Cardiovascular:      Rate and Rhythm: Tachycardia present. Rhythm irregular.      Pulses: Normal pulses.      Heart sounds:      Gallop present.   Pulmonary:      Effort: Pulmonary effort is normal. No respiratory distress.      Breath sounds: Normal breath sounds. No wheezing, rhonchi or rales.   Abdominal:      General: Bowel sounds are normal.      Palpations: Abdomen is soft. There is splenomegaly.      Tenderness: There is no abdominal tenderness.   Musculoskeletal:         General: No swelling or tenderness. Normal range of motion.   Lymphadenopathy:      Cervical: Cervical adenopathy present.      Upper Body:      Right upper body: Supraclavicular adenopathy and axillary adenopathy present.      Left upper body: Supraclavicular adenopathy and axillary adenopathy present.   Skin:     General: Skin is warm and dry.      Capillary Refill: Capillary refill takes less than 2 seconds.   Neurological:      General: No focal deficit present.     "  Mental Status: She is alert and oriented to person, place, and time. Mental status is at baseline.      Sensory: No sensory deficit.      Motor: No weakness.   Psychiatric:         Mood and Affect: Mood normal.         Behavior: Behavior normal.         Thought Content: Thought content normal.         Judgment: Judgment normal.     Last Recorded Vitals  Blood pressure 97/60, pulse 105, temperature 37.3 °C (99.1 °F), temperature source Temporal, resp. rate 18, height 1.694 m (5' 6.69\"), weight 56.6 kg (124 lb 12.5 oz), SpO2 96%.  Intake/Output last 3 Shifts:  I/O last 3 completed shifts:  In: 56.7 (1 mL/kg) [I.V.:56.7 (1 mL/kg)]  Out: - (0 mL/kg)   Weight: 56.6 kg     Relevant Results           Results for orders placed or performed during the hospital encounter of 06/11/24 (from the past 24 hour(s))   Sars-CoV-2 PCR   Result Value Ref Range    Coronavirus 2019, PCR Not Detected Not Detected   CBC and Auto Differential   Result Value Ref Range    WBC 50.1 (HH) 4.4 - 11.3 x10*3/uL    nRBC 0.1 (H) 0.0 - 0.0 /100 WBCs    RBC 2.84 (L) 4.00 - 5.20 x10*6/uL    Hemoglobin 8.2 (L) 12.0 - 16.0 g/dL    Hematocrit 25.9 (L) 36.0 - 46.0 %    MCV 91 80 - 100 fL    MCH 28.9 26.0 - 34.0 pg    MCHC 31.7 (L) 32.0 - 36.0 g/dL    RDW 14.9 (H) 11.5 - 14.5 %    Platelets 150 150 - 450 x10*3/uL    Immature Granulocytes %, Automated 1.6 (H) 0.0 - 0.9 %    Immature Granulocytes Absolute, Automated 0.80 (H) 0.00 - 0.50 x10*3/uL   Renal Function Panel   Result Value Ref Range    Glucose 93 74 - 99 mg/dL    Sodium 137 136 - 145 mmol/L    Potassium 4.5 3.5 - 5.3 mmol/L    Chloride 109 (H) 98 - 107 mmol/L    Bicarbonate 20 (L) 21 - 32 mmol/L    Anion Gap 13 10 - 20 mmol/L    Urea Nitrogen 41 (H) 6 - 23 mg/dL    Creatinine 1.73 (H) 0.50 - 1.05 mg/dL    eGFR 31 (L) >60 mL/min/1.73m*2    Calcium 8.3 (L) 8.6 - 10.6 mg/dL    Phosphorus 3.0 2.5 - 4.9 mg/dL    Albumin 2.9 (L) 3.4 - 5.0 g/dL   Magnesium   Result Value Ref Range    Magnesium 1.67 1.60 - " 2.40 mg/dL   Uric Acid   Result Value Ref Range    Uric Acid 8.9 (H) 2.3 - 6.7 mg/dL   Lactate Dehydrogenase   Result Value Ref Range    LDH 4,106 (H) 84 - 246 U/L   Fibrinogen   Result Value Ref Range    Fibrinogen 198 (L) 200 - 400 mg/dL   Manual Differential   Result Value Ref Range    Neutrophils %, Manual 26.2 40.0 - 80.0 %    Lymphocytes %, Manual 18.1 13.0 - 44.0 %    Monocytes %, Manual 0.8 2.0 - 10.0 %    Eosinophils %, Manual 0.0 0.0 - 6.0 %    Basophils %, Manual 0.8 0.0 - 2.0 %    Atypical Lymphocytes %, Manual 53.3 0.0 - 2.0 %    Metamyelocytes %, Manual 0.8 0.0 - 0.0 %    Seg Neutrophils Absolute, Manual 13.13 (H) 1.60 - 5.00 x10*3/uL    Lymphocytes Absolute, Manual 9.07 (H) 0.80 - 3.00 x10*3/uL    Monocytes Absolute, Manual 0.40 0.05 - 0.80 x10*3/uL    Eosinophils Absolute, Manual 0.00 0.00 - 0.40 x10*3/uL    Basophils Absolute, Manual 0.40 (H) 0.00 - 0.10 x10*3/uL    Atypical Lymphs Absolute, Manual 26.70 (H) 0.00 - 0.30 x10*3/uL    Metamyelocytes Absolute, Manual 0.40 0.00 - 0.00 x10*3/uL    Total Cells Counted 122     RBC Morphology No significant RBC morphology present    Onco-Echo Complete (Strain & 3D)   Result Value Ref Range    AV pk reji 1.34 m/s    LVOT diam 1.90 cm    LV Biplane EF 74 %    MV avg E/e' ratio 6.18     MV E/A ratio 1.29     Tricuspid annular plane systolic excursion 2.2 cm    RV free wall pk S' 19.70 cm/s    LV GLS 17.2 %    LVIDd 4.00 cm    RVSP 29.4 mmHg    Aortic Valve Area by Continuity of Peak Velocity 2.37 cm2    AV pk grad 7.2 mmHg    LV A4C EF 73.6      Scheduled medications  acyclovir, 400 mg, oral, q12h ADDI  allopurinol, 300 mg, oral, BID  dexAMETHasone, 4 mg, oral, q12h ADDI  heparin (porcine), 5,000 Units, subcutaneous, q8h  hydroCHLOROthiazide, 12.5 mg, oral, Daily  lidocaine, 5 mL, infiltration, Once  metoprolol tartrate, 75 mg, oral, BID  pantoprazole, 40 mg, oral, Daily before breakfast  perflutren protein A microsphere, 0.5 mL, intravenous, Once in  imaging  sulfur hexafluoride microsphr, 2 mL, intravenous, Once in imaging      Continuous medications  sodium chloride 0.9%, 100 mL/hr, Last Rate: 100 mL/hr (24 [id number])      PRN medications  PRN medications: alteplase, alteplase, LORazepam              Assessment/Plan   Principal Problem:    Acute leukemia (Multi)  Active Problems:    Tumor lysis syndrome ([facility]-HCC)    Hypertension    Jailene Lacy is a 73 y.o. female with PMHx of HTN, HLD, presents to OSH with leukocytosis of 60k and diffuse lymphadenopathy of the face/neck who is being transferred to [facility] for further evaluation and treatment.     ONC:  # ?DLBCL vs high grade lymphoma   Presented with leukocytosis of 60k  -flow cytometry favoring lymphoid malignancy      - initiate dexamethasone 4mg q12h  -Onco-Echo EF 70-75%  -BMBx, pending  - Consult ENT for lymph node biopsy    HEME:  #Tumor lysis syndrome  - monitor counts daily, fibrinogen, coags  - no evidence of DIC or hypercoag state  - TLS labs q12h  #Anemia, chronic  -hold home ferrous sulfate     Ppx: heparin 5000u q8h    ID:  [id number]  -no evidence of active infection on admit  -covid swab on admit  -plan Zosyn/panculture/CXR if febrile    Ppx: acyclovir     FEN/GI:  Admit weight: 56.6 kg  Low path diet  #YRN, baseline sCr ~1.4-1.8, sCr today: 1.73   -mIVF NS at 100mL/hr  #Hyperuricemia, 2/2 TLS  Uric acid down-trendin.3 --> 8.9   -allopurinol 300mg BID  #Hyperkalemia, 2/2 TLS  -mIVF    Ppx: pantoprazole    CARDS:  #Hypertension  -hold lisinopril 10 mg in setting of YRN  -cont home metoprolol 75 mg BID  -cont home hctz 12.5 mg daily      PSYCH:  #Anxiety  -Ativan 0.5mg q8h PRN    DISPO:  Full Code  DL SOLO PICC  Blood consent on admit  Needs primary oncologist       EM Garcia-CNP

## 2024-06-12 NOTE — NURSING NOTE
Nursing Note  Patient remains afebrile, continue to receive PO antibiotic. Vital signs stable. Denies any pain, nausea, vomiting and diarrhea. PO intake good. Continue to receive IV fluid 0.9 % NaCl at 100 ML/HR.Voids clear yellow urine adequate amount. Right iliac crest status post  bone marrow biopsy site with sterile dressing on D&I. No oozing, no hematoma and no ecchymosis. Ambulating in room. Family at bed side.Will continue to monitor patient and will notify MD/NP of acute changes.

## 2024-06-12 NOTE — CARE PLAN
The patient's goals for the shift include      The clinical goals for the shift include Patient will be hemodynamically stable

## 2024-06-13 PROBLEM — R59.1 LYMPHADENOPATHY: Status: ACTIVE | Noted: 2024-06-10

## 2024-06-13 LAB
ALBUMIN SERPL BCP-MCNC: 2.8 G/DL (ref 3.4–5)
ALBUMIN SERPL BCP-MCNC: 3.1 G/DL (ref 3.4–5)
ANION GAP SERPL CALC-SCNC: 11 MMOL/L (ref 10–20)
ANION GAP SERPL CALC-SCNC: 14 MMOL/L (ref 10–20)
BASOPHILS # BLD MANUAL: 0 X10*3/UL (ref 0–0.1)
BASOPHILS NFR BLD MANUAL: 0 %
BUN SERPL-MCNC: 40 MG/DL (ref 6–23)
BUN SERPL-MCNC: 41 MG/DL (ref 6–23)
CALCIUM SERPL-MCNC: 7.9 MG/DL (ref 8.6–10.6)
CALCIUM SERPL-MCNC: 8.1 MG/DL (ref 8.6–10.6)
CHLORIDE SERPL-SCNC: 109 MMOL/L (ref 98–107)
CHLORIDE SERPL-SCNC: 110 MMOL/L (ref 98–107)
CMV IGM SERPL-ACNC: <8 AU/ML
CO2 SERPL-SCNC: 17 MMOL/L (ref 21–32)
CO2 SERPL-SCNC: 19 MMOL/L (ref 21–32)
CREAT SERPL-MCNC: 1.52 MG/DL (ref 0.5–1.05)
CREAT SERPL-MCNC: 1.67 MG/DL (ref 0.5–1.05)
EGFRCR SERPLBLD CKD-EPI 2021: 33 ML/MIN/1.73M*2
EGFRCR SERPLBLD CKD-EPI 2021: 37 ML/MIN/1.73M*2
EOSINOPHIL # BLD MANUAL: 0 X10*3/UL (ref 0–0.4)
EOSINOPHIL NFR BLD MANUAL: 0 %
ERYTHROCYTE [DISTWIDTH] IN BLOOD BY AUTOMATED COUNT: 15 % (ref 11.5–14.5)
FIBRINOGEN PPP-MCNC: 213 MG/DL (ref 200–400)
GLUCOSE SERPL-MCNC: 115 MG/DL (ref 74–99)
GLUCOSE SERPL-MCNC: 178 MG/DL (ref 74–99)
HCT VFR BLD AUTO: 25.5 % (ref 36–46)
HGB BLD-MCNC: 8 G/DL (ref 12–16)
HTLV I+II AB SER QL IA: NEGATIVE
IMM GRANULOCYTES # BLD AUTO: 0.47 X10*3/UL (ref 0–0.5)
IMM GRANULOCYTES NFR BLD AUTO: 1.5 % (ref 0–0.9)
LDH SERPL L TO P-CCNC: 3994 U/L (ref 84–246)
LDH SERPL L TO P-CCNC: 4225 U/L (ref 84–246)
LYMPHOCYTES # BLD MANUAL: 1.25 X10*3/UL (ref 0.8–3)
LYMPHOCYTES NFR BLD MANUAL: 4 %
MAGNESIUM SERPL-MCNC: 2.13 MG/DL (ref 1.6–2.4)
MCH RBC QN AUTO: 28.8 PG (ref 26–34)
MCHC RBC AUTO-ENTMCNC: 31.4 G/DL (ref 32–36)
MCV RBC AUTO: 92 FL (ref 80–100)
METAMYELOCYTES # BLD MANUAL: 0.31 X10*3/UL
METAMYELOCYTES NFR BLD MANUAL: 1 %
MONOCYTES # BLD MANUAL: 0.62 X10*3/UL (ref 0.05–0.8)
MONOCYTES NFR BLD MANUAL: 2 %
NEUTROPHILS # BLD MANUAL: 16.23 X10*3/UL (ref 1.6–5.5)
NEUTS BAND # BLD MANUAL: 0.94 X10*3/UL (ref 0–0.5)
NEUTS BAND NFR BLD MANUAL: 3 %
NEUTS SEG # BLD MANUAL: 15.29 X10*3/UL (ref 1.6–5)
NEUTS SEG NFR BLD MANUAL: 49 %
NRBC BLD-RTO: 0.1 /100 WBCS (ref 0–0)
PHOSPHATE SERPL-MCNC: 3.8 MG/DL (ref 2.5–4.9)
PHOSPHATE SERPL-MCNC: 4.2 MG/DL (ref 2.5–4.9)
PLATELET # BLD AUTO: 139 X10*3/UL (ref 150–450)
POTASSIUM SERPL-SCNC: 4.6 MMOL/L (ref 3.5–5.3)
POTASSIUM SERPL-SCNC: 5.2 MMOL/L (ref 3.5–5.3)
RBC # BLD AUTO: 2.78 X10*6/UL (ref 4–5.2)
RBC MORPH BLD: ABNORMAL
SODIUM SERPL-SCNC: 134 MMOL/L (ref 136–145)
SODIUM SERPL-SCNC: 136 MMOL/L (ref 136–145)
STAPHYLOCOCCUS SPEC CULT: NORMAL
TOTAL CELLS COUNTED BLD: 100
URATE SERPL-MCNC: 6.2 MG/DL (ref 2.3–6.7)
URATE SERPL-MCNC: 7.2 MG/DL (ref 2.3–6.7)
VARIANT LYMPHS # BLD MANUAL: 12.79 X10*3/UL (ref 0–0.3)
VARIANT LYMPHS NFR BLD: 41 %
WBC # BLD AUTO: 31.2 X10*3/UL (ref 4.4–11.3)

## 2024-06-13 PROCEDURE — 99233 SBSQ HOSP IP/OBS HIGH 50: CPT | Performed by: INTERNAL MEDICINE

## 2024-06-13 PROCEDURE — 85384 FIBRINOGEN ACTIVITY: CPT | Performed by: PHYSICIAN ASSISTANT

## 2024-06-13 PROCEDURE — 1170000001 HC PRIVATE ONCOLOGY ROOM DAILY

## 2024-06-13 PROCEDURE — 85027 COMPLETE CBC AUTOMATED: CPT

## 2024-06-13 PROCEDURE — 84550 ASSAY OF BLOOD/URIC ACID: CPT | Performed by: PHYSICIAN ASSISTANT

## 2024-06-13 PROCEDURE — 83615 LACTATE (LD) (LDH) ENZYME: CPT | Performed by: PHYSICIAN ASSISTANT

## 2024-06-13 PROCEDURE — 2500000004 HC RX 250 GENERAL PHARMACY W/ HCPCS (ALT 636 FOR OP/ED)

## 2024-06-13 PROCEDURE — 85007 BL SMEAR W/DIFF WBC COUNT: CPT

## 2024-06-13 PROCEDURE — 80069 RENAL FUNCTION PANEL: CPT

## 2024-06-13 PROCEDURE — 80069 RENAL FUNCTION PANEL: CPT | Mod: 91

## 2024-06-13 PROCEDURE — 83735 ASSAY OF MAGNESIUM: CPT

## 2024-06-13 PROCEDURE — 84550 ASSAY OF BLOOD/URIC ACID: CPT | Mod: 91,MUE

## 2024-06-13 PROCEDURE — 83615 LACTATE (LD) (LDH) ENZYME: CPT | Mod: 91

## 2024-06-13 PROCEDURE — 2500000001 HC RX 250 WO HCPCS SELF ADMINISTERED DRUGS (ALT 637 FOR MEDICARE OP)

## 2024-06-13 PROCEDURE — 99221 1ST HOSP IP/OBS SF/LOW 40: CPT | Performed by: OTOLARYNGOLOGY

## 2024-06-13 RX ORDER — HEPARIN SODIUM 5000 [USP'U]/ML
5000 INJECTION, SOLUTION INTRAVENOUS; SUBCUTANEOUS EVERY 8 HOURS
Status: CANCELLED | OUTPATIENT
Start: 2024-06-13

## 2024-06-13 ASSESSMENT — COGNITIVE AND FUNCTIONAL STATUS - GENERAL
MOBILITY SCORE: 24
MOBILITY SCORE: 24
DAILY ACTIVITIY SCORE: 24
DAILY ACTIVITIY SCORE: 24

## 2024-06-13 ASSESSMENT — PAIN SCALES - GENERAL
PAINLEVEL_OUTOF10: 0 - NO PAIN

## 2024-06-13 ASSESSMENT — PAIN - FUNCTIONAL ASSESSMENT
PAIN_FUNCTIONAL_ASSESSMENT: 0-10
PAIN_FUNCTIONAL_ASSESSMENT: 0-10

## 2024-06-13 NOTE — NURSING NOTE
Nursing Note  Patient remains afebrile, continue to receive PO antibiotic. Vital signs stable. Denies any pain ,nausea, vomiting and diarrhea. PO intake fair. Continue to receive IV fluid 0.9 % NaCl at 100 ML/HR. Voids clear yellow urine adequate amount .Presently, patient NPO for procedure. Ambulating in room. Family at bed side.Will continue to monitor patient and will notify MD of acute changes.

## 2024-06-13 NOTE — PROGRESS NOTES
Jailene Lacy is a 73 y.o. female on day 2 of admission presenting with Acute leukemia (Multi).    Subjective   Afebrile, VSS. Pt states she feels good. Family at bedside. Patient otherwise states she slept well and feels great. Denies, HA, vision changes, CP, SOB, palpitations, N/V/D/C, flankpain/dysuria. ROS otherwise negative        Objective     Physical Exam  Vitals and nursing note reviewed.   Constitutional:       General: She is not in acute distress.     Appearance: Normal appearance. She is not toxic-appearing.   HENT:      Head: Normocephalic.      Comments: Bilateral mandibular lymphadenopathy L>R, bilateral cervical lymphadenopathy     Mouth/Throat:      Mouth: Mucous membranes are moist.      Dentition: Gingival swelling and dental caries present.      Tongue: Lesions (R tongue) present.   Eyes:      General: No scleral icterus.     Extraocular Movements: Extraocular movements intact.      Pupils: Pupils are equal, round, and reactive to light.   Cardiovascular:      Rate and Rhythm: Tachycardia present. Rhythm irregular.      Pulses: Normal pulses.      Heart sounds:      Gallop present.   Pulmonary:      Effort: Pulmonary effort is normal. No respiratory distress.      Breath sounds: Normal breath sounds. No wheezing, rhonchi or rales.   Abdominal:      General: Bowel sounds are normal.      Palpations: Abdomen is soft. There is splenomegaly.      Tenderness: There is no abdominal tenderness.   Musculoskeletal:         General: No swelling or tenderness. Normal range of motion.   Lymphadenopathy:      Cervical: Cervical adenopathy present.      Upper Body:      Right upper body: Supraclavicular adenopathy and axillary adenopathy present.      Left upper body: Supraclavicular adenopathy and axillary adenopathy present.   Skin:     General: Skin is warm and dry.      Capillary Refill: Capillary refill takes less than 2 seconds.   Neurological:      General: No focal deficit present.      Mental Status:  "She is alert and oriented to person, place, and time. Mental status is at baseline.      Sensory: No sensory deficit.      Motor: No weakness.   Psychiatric:         Mood and Affect: Mood normal.         Behavior: Behavior normal.         Thought Content: Thought content normal.         Judgment: Judgment normal.     Last Recorded Vitals  Blood pressure 107/62, pulse (!) 114, temperature 37.2 °C (99 °F), temperature source Temporal, resp. rate 20, height 1.694 m (5' 6.69\"), weight 58.9 kg (129 lb 13.6 oz), SpO2 96%.  Intake/Output last 3 Shifts:  I/O last 3 completed shifts:  In: 1435 (24.4 mL/kg) [I.V.:1435 (24.4 mL/kg)]  Out: - (0 mL/kg)   Weight: 58.9 kg     Relevant Results           Results for orders placed or performed during the hospital encounter of 06/11/24 (from the past 24 hour(s))   Renal function panel   Result Value Ref Range    Glucose 101 (H) 74 - 99 mg/dL    Sodium 136 136 - 145 mmol/L    Potassium 4.4 3.5 - 5.3 mmol/L    Chloride 108 (H) 98 - 107 mmol/L    Bicarbonate 20 (L) 21 - 32 mmol/L    Anion Gap 12 10 - 20 mmol/L    Urea Nitrogen 38 (H) 6 - 23 mg/dL    Creatinine 1.67 (H) 0.50 - 1.05 mg/dL    eGFR 33 (L) >60 mL/min/1.73m*2    Calcium 8.2 (L) 8.6 - 10.6 mg/dL    Phosphorus 2.8 2.5 - 4.9 mg/dL    Albumin 3.0 (L) 3.4 - 5.0 g/dL   Lactate dehydrogenase   Result Value Ref Range    LDH 4,085 (H) 84 - 246 U/L   CBC   Result Value Ref Range    WBC 44.0 (H) 4.4 - 11.3 x10*3/uL    nRBC 0.1 (H) 0.0 - 0.0 /100 WBCs    RBC 2.92 (L) 4.00 - 5.20 x10*6/uL    Hemoglobin 8.4 (L) 12.0 - 16.0 g/dL    Hematocrit 27.0 (L) 36.0 - 46.0 %    MCV 93 80 - 100 fL    MCH 28.8 26.0 - 34.0 pg    MCHC 31.1 (L) 32.0 - 36.0 g/dL    RDW 15.0 (H) 11.5 - 14.5 %    Platelets 144 (L) 150 - 450 x10*3/uL   Uric Acid   Result Value Ref Range    Uric Acid 8.1 (H) 2.3 - 6.7 mg/dL   CBC and Auto Differential   Result Value Ref Range    WBC 31.2 (H) 4.4 - 11.3 x10*3/uL    nRBC 0.1 (H) 0.0 - 0.0 /100 WBCs    RBC 2.78 (L) 4.00 - 5.20 " x10*6/uL    Hemoglobin 8.0 (L) 12.0 - 16.0 g/dL    Hematocrit 25.5 (L) 36.0 - 46.0 %    MCV 92 80 - 100 fL    MCH 28.8 26.0 - 34.0 pg    MCHC 31.4 (L) 32.0 - 36.0 g/dL    RDW 15.0 (H) 11.5 - 14.5 %    Platelets 139 (L) 150 - 450 x10*3/uL    Immature Granulocytes %, Automated 1.5 (H) 0.0 - 0.9 %    Immature Granulocytes Absolute, Automated 0.47 0.00 - 0.50 x10*3/uL   Renal Function Panel   Result Value Ref Range    Glucose 115 (H) 74 - 99 mg/dL    Sodium 134 (L) 136 - 145 mmol/L    Potassium 5.2 3.5 - 5.3 mmol/L    Chloride 109 (H) 98 - 107 mmol/L    Bicarbonate 19 (L) 21 - 32 mmol/L    Anion Gap 11 10 - 20 mmol/L    Urea Nitrogen 41 (H) 6 - 23 mg/dL    Creatinine 1.67 (H) 0.50 - 1.05 mg/dL    eGFR 33 (L) >60 mL/min/1.73m*2    Calcium 8.1 (L) 8.6 - 10.6 mg/dL    Phosphorus 3.8 2.5 - 4.9 mg/dL    Albumin 2.8 (L) 3.4 - 5.0 g/dL   Magnesium   Result Value Ref Range    Magnesium 2.13 1.60 - 2.40 mg/dL   Uric Acid   Result Value Ref Range    Uric Acid 7.2 (H) 2.3 - 6.7 mg/dL   Lactate Dehydrogenase   Result Value Ref Range    LDH 3,994 (H) 84 - 246 U/L   Fibrinogen   Result Value Ref Range    Fibrinogen 213 200 - 400 mg/dL   Manual Differential   Result Value Ref Range    Neutrophils %, Manual 49.0 40.0 - 80.0 %    Bands %, Manual 3.0 0.0 - 5.0 %    Lymphocytes %, Manual 4.0 13.0 - 44.0 %    Monocytes %, Manual 2.0 2.0 - 10.0 %    Eosinophils %, Manual 0.0 0.0 - 6.0 %    Basophils %, Manual 0.0 0.0 - 2.0 %    Atypical Lymphocytes %, Manual 41.0 0.0 - 2.0 %    Metamyelocytes %, Manual 1.0 0.0 - 0.0 %    Seg Neutrophils Absolute, Manual 15.29 (H) 1.60 - 5.00 x10*3/uL    Bands Absolute, Manual 0.94 (H) 0.00 - 0.50 x10*3/uL    Lymphocytes Absolute, Manual 1.25 0.80 - 3.00 x10*3/uL    Monocytes Absolute, Manual 0.62 0.05 - 0.80 x10*3/uL    Eosinophils Absolute, Manual 0.00 0.00 - 0.40 x10*3/uL    Basophils Absolute, Manual 0.00 0.00 - 0.10 x10*3/uL    Atypical Lymphs Absolute, Manual 12.79 (H) 0.00 - 0.30 x10*3/uL     Metamyelocytes Absolute, Manual 0.31 0.00 - 0.00 x10*3/uL    Total Cells Counted 100     Neutrophils Absolute, Manual 16.23 (H) 1.60 - 5.50 x10*3/uL    RBC Morphology No significant RBC morphology present              Assessment/Plan   Principal Problem:    Acute leukemia (Multi)  Active Problems:    Tumor lysis syndrome (HHS-HCC)    Hypertension    Jailene Lacy is a 73 y.o. female with PMHx of HTN, HLD, presents to OSH with leukocytosis of 60k and diffuse lymphadenopathy of the face/neck who is being transferred to [facility] for further evaluation and treatment.      ONC:  # ?DLBCL vs high grade lymphoma   Presented with leukocytosis of 60k  -flow cytometry favoring lymphoid malignancy      - initiate dexamethasone 4mg q12h  -Onco-Echo EF 70-75%  -BMBx, pending  - Consult ENT for lymph node biopsy, planned for , NPO at [facility]  - PET scan for lymphoma staging ordered     HEME:  #Tumor lysis syndrome  - monitor counts daily, fibrinogen, coags  - no evidence of DIC or hypercoag state  - TLS labs q12h  #Anemia, chronic  -hold home ferrous sulfate      Ppx: heparin 5000u q8h     ID:  [id number]  -no evidence of active infection on admit  -covid swab on admit  -plan Zosyn/panculture/CXR if febrile     Ppx: acyclovir      FEN/GI:  Admit weight: 56.6 kg, current wt: 58.9 kg  Low path diet  #YRN, baseline sCr ~1.4-1.8, sCr today: 1.69  -mIVF NS at 100mL/hr  #Hyperuricemia, 2/2 TLS  Uric acid down-trendin.3 --> 8.9-->7.2  -allopurinol 300mg BID  #Hyperkalemia, 2/2 TLS  -mIVF     Ppx: pantoprazole     CARDS:  #Hypertension  -hold lisinopril 10 mg in setting of YRN  -cont home metoprolol 75 mg BID  -cont home hctz 12.5 mg daily        PSYCH:  #Anxiety  -Ativan 0.5mg q8h PRN     DISPO:  Full Code  DL SOLO PICC  Blood consent on admit  Needs primary oncologist       Sky Gracia, APRN-CNP

## 2024-06-13 NOTE — CARE PLAN
The patient's goals for the shift include  remain safe and free of falls.     The clinical goals for the shift include Pt will be safe and free from falls    VSS; afebrile this shift.  Patient had no c/o pain overnight; no c/o n/v/d.  Patient continues to receive NS at 100 ml/hr per order. Patient up in room ad lyubov; remained safe and free of falls.        Problem: Pain  Goal: My pain/discomfort is manageable  Outcome: Progressing     Problem: Safety  Goal: Patient will be injury free during hospitalization  Outcome: Progressing     Problem: Safety  Goal: I will remain free of falls  Outcome: Progressing     Problem: Daily Care  Goal: Daily care needs are met  Outcome: Progressing     Problem: Psychosocial Needs  Goal: Demonstrates ability to cope with hospitalization/illness  Outcome: Progressing     Problem: Skin  Goal: Participates in plan/prevention/treatment measures  Outcome: Progressing     Problem: Fall/Injury  Goal: Not fall by end of shift  Outcome: Progressing     Problem: Fall/Injury  Goal: Be free from injury by end of the shift  Outcome: Progressing     Problem: Fall/Injury  Goal: Verbalize understanding of risk factor reduction measures to prevent injury from fall in the home  Outcome: Progressing     Problem: Fall/Injury  Goal: Pace activities to prevent fatigue by end of the shift  Outcome: Progressing

## 2024-06-13 NOTE — CARE PLAN
The patient's goals for the shift include      The clinical goals for the shift include Patient will be hemodynamically stable.

## 2024-06-13 NOTE — CONSULTS
Reason For Consult  Lymph node biopsy    History Of Present Illness  Jailene Lacy is a 73 y.o. female who is generally healthy who presented from an outside hospital with leukocytosis, new onset diffuse lymphadenopathy, concern for tumor lysis syndrome in the setting of suspected acute leukemia or lymphoma.  She notes that her and her  first noticed small bumps on her upper arm about a month ago.  These have also arisen in other areas of the body including the neck and face.  She has experienced a small amount of weight loss and infrequent night sweats.  Was feeling very fatigued and for that reason presented to outside hospital.  Following transfer and treatment of tumor lysis syndrome she is feeling significantly improved.  She denies any history of cancer or surgery to the head or neck.  No other complaints or concerns today.  She denies any history of tobacco use       Past Medical History  She has a past medical history of Cancer (Multi) and Hypertension.    Surgical History  She has a past surgical history that includes Tonsillectomy.     Social History  She reports that she has never smoked. She has never been exposed to tobacco smoke. She has never used smokeless tobacco. She reports that she does not drink alcohol and does not use drugs.    Family History  Family History   Problem Relation Name Age of Onset    Breast cancer Mother  61    Heart disease Father  94    No Known Problems Sister      No Known Problems Brother          Allergies  Patient has no known allergies.    Review of Systems  See above    PHYSICAL EXAMINATION:  Constitutional:  No acute distress  Voice:  No hoarseness or other abnormality   Respiration:  Breathing comfortably, no stridor  Eyes:  EOM intact, sclera normal  Neuro:  Alert and conversive.  Cranial nerves grossly intact no evidence of left facial weakness  Head and Face: Face without gross deformity though some asymmetry especially in the area of the cheeks and parotid  "glands.  No skin changes or defects appreciated  Ears: Normal external ears  Nose:  External nose midline, anterior rhinoscopy is normal with limited visualization to the anterior aspect of the inferior turbinates, no bleeding or drainage, no lesions  Oral Cavity/Oropharynx/Lips:  Normal mucous membranes, normal floor of mouth/tongue/OP, no masses or lesions, tonsils   Neck/Lymph: Neck exam reveals diffuse bilateral lymphadenopathy.  Prominent superficial node in right supraclavicular area.  Significant enlargement and firmness in the area of the left parotid gland extending to the angle of the mandible.  Additional bilateral cervical lymphadenopathy appreciated  Skin: No skin changes or evidence of cellulitis or infection  Psych:  Alert with appropriate mood and affect     Last Recorded Vitals  Blood pressure 107/62, pulse (!) 114, temperature 37.2 °C (99 °F), temperature source Temporal, resp. rate 20, height 1.694 m (5' 6.69\"), weight 58.9 kg (129 lb 13.6 oz), SpO2 96%.    Relevant Results    CT soft tissue neck w con 6/10  FINDINGS:   PHARYNX/LARYNX: The tonsillar pillars are normal in appearance.  The tongue   is normal in appearance.  The valleculae, epiglottis, aryepiglottic folds and   pyriform sinuses appear unremarkable.  The true and false vocal cords are   normal in appearance.  No mass or abscess is seen.     SALIVARY GLANDS/THYROID:  The parotid and submandibular glands appear   unremarkable.     There is a hypodense nodule within the left lobe of the thyroid gland   measuring 2.2 cm.  Thyroid ultrasound is suggested for further evaluation.     LYMPH NODES:  Moderate diffuse bilateral cervical lymphadenopathy is   identified involving the jugular chains as well as the submandibular region   bilaterally.  The most prominent lymph node is located within the right   supraclavicular region measuring 2 cm and within the left level 2 jugular   chain measuring 2.3 cm.  The largest right submandibular lymph " node measures   1.8 cm.     SOFT TISSUES:  No appreciable soft tissue swelling or mass is seen.     BRAIN/ORBITS/SINUSES:  The visualized portion of the intracranial contents   appear unremarkable.  The visualized portion of the orbits, paranasal sinuses   and mastoid air cells demonstrate no acute abnormality.     LUNG APICES/SUPERIOR MEDIASTINUM:  No focal consolidation is seen within the   visualized lung apices.  No superior mediastinal lymphadenopathy or mass.   The visualized portion of the trachea appears unremarkable.     US Thyroid 6/10  1.  Mild thyroid heterogeneity.  Normal vascularity.     2.  Bilateral thyroid nodules.  The nodules described above on the left meet   criteria for FNA.  Left midpole TR 5 nodule and left lower pole TR 5 nodule.     3.  Abnormal lymphadenopathy partially evaluated on this study.  Left and   right neck lymph nodes with very thickened cortices.  Please reference CT   neck report from the same day.      Assessment/Plan   Patient is a 73-year-old female presenting with concern for new diffuse lymphadenopathy and suspected tumor lysis syndrome and probable lymphoma.  She is undergoing further workup including bone marrow biopsy completed yesterday.  ENT team consulted for help with tissue diagnosis via lymph node biopsy.  Patient notes some subjective improvement in her fatigue and symptomology since arriving to the hospital.  She has noted on exam to have multiple areas in the head and neck with lymphadenopathy including the left parotid gland, perifacial areas bilaterally, and bilateral neck.  Ultrasound was also completed which demonstrated two TI-RADS 5 thyroid nodules.    -Please push forward outside imaging from [facility] for ENT review. Recommend CT neck w contrast if unable to obtain outside images.  -Okay for PO today. Please make NPO at midnight (for 6/14). Tentative plan for OR 6/14 pending add on status and OR availability  -Will obtain informed consent  -Patient will  need FNA and follow up of the two TI-RADS 5  thyroid nodules.  Recommend IR guided biopsies (nonurgent, could be done as outpatient)    Houston Gregorio MD  Dept. of Otolaryngology - Head and Neck Surgery, PGY-3  ENT Consults: [id number]  ENT Overnight (5p-6a), and Weekends: [id number]  ENT Head and Neck Surgery Phone: [phone]  ENT Peds: [id number]  ENT Outpatient scheduling number: [phone]

## 2024-06-13 NOTE — DISCHARGE SUMMARY
[facility] Hospitalist       Hospitalist Physician Discharge Summary       No follow-up provider specified.    Activity level: ambulates well but feels tired     Diet: No diet orders on file    Condition at discharge: needs more evaluation and treatment     Dispo:[facility]       Patient ID:  Natasha Ferrari  [id number]  73 y.o.  1950    Admit date: 6/10/2024    Discharge date and time:   6//10/2024     Admission Diagnoses: Principal Problem:    Tumor lysis syndrome  Active Problems:    Essential hypertension    Thyroid nodule    Acute kidney injury superimposed on CKD (HCC)    Hyperkalemia    Elevated d-dimer  Resolved Problems:    * No resolved hospital problems. *      Discharge Diagnoses: Principal Problem:    Tumor lysis syndrome  Active Problems:    Essential hypertension    Thyroid nodule    Acute kidney injury superimposed on CKD (HCC)    Hyperkalemia    Elevated d-dimer  Resolved Problems:    * No resolved hospital problems. *      Consults:  IP CONSULT TO NEPHROLOGY  IP CONSULT TO PHARMACY    Procedures: none     Hospital Course: This is a 73 years old white lady with significant past medical history of hypertension, hyperlipidemia, presented to the emergency room because of excessive fatigue and lumps in her neck she denies any fever chills or night sweats.  She lost some weight but not much she has poor appetite in the emergency room she was found to have creatinine of 2.82 with a baseline creatinine 1.8 elevated uric acid at 24.3 and white count at 60.9 LDH more than 2500.  D-dimer 3022 CT scan of the head showed diffuse bilateral cervical lymphadenopathy with left thyroid nodule 2.2 cm in the CT chest showed bilateral axillary lymph adenopathy with left lung base infiltrate , CT abdomen showed splenomegaly and lymph nodes of the abdomen and pelvis enlarged.  These all were new for her,  Patient had suspected lymphoma with tumor lysis syndrome.  Oncology and nephrology  Vitamins-Minerals (CENTRUM SILVER 50+WOMEN) TABS Take 1 tablet by mouth Daily with supperHistorical Med           STOP taking these medications       Metoprolol Tartrate 75 MG TABS Comments:   Reason for Stopping:         lisinopril (PRINIVIL;ZESTRIL) 10 MG tablet Comments:   Reason for Stopping:         hydroCHLOROthiazide 12.5 MG tablet Comments:   Reason for Stopping:                 Note that more  than 30 minutes was spent in preparing discharge papers, discussing discharge with patient, medication review, etc.    NOTE: This report was transcribed using voice recognition software. Every effort was made to ensure accuracy; however, inadvertent computerized transcription errors may be present.     Signed:  Electronically signed by Wilner Shaver MD on 6/13/2024 at 6:13 PM

## 2024-06-14 ENCOUNTER — APPOINTMENT (OUTPATIENT)
Dept: RADIOLOGY | Facility: HOSPITAL | Age: 74
DRG: 840 | End: 2024-06-14
Payer: MEDICARE

## 2024-06-14 ENCOUNTER — APPOINTMENT (OUTPATIENT)
Dept: CARDIOLOGY | Facility: HOSPITAL | Age: 74
End: 2024-06-14
Payer: MEDICARE

## 2024-06-14 LAB
ACANTHOCYTES BLD QL SMEAR: ABNORMAL
ALBUMIN SERPL BCP-MCNC: 2.8 G/DL (ref 3.4–5)
ALBUMIN SERPL BCP-MCNC: 3.1 G/DL (ref 3.4–5)
ALP SERPL-CCNC: 78 U/L (ref 33–136)
ALT SERPL W P-5'-P-CCNC: 21 U/L (ref 7–45)
ANION GAP SERPL CALC-SCNC: 12 MMOL/L (ref 10–20)
ANION GAP SERPL CALC-SCNC: 13 MMOL/L (ref 10–20)
AST SERPL W P-5'-P-CCNC: 85 U/L (ref 9–39)
ATRIAL RATE: 120 BPM
ATRIAL RATE: 121 BPM
BASOPHILS # BLD MANUAL: 0 X10*3/UL (ref 0–0.1)
BASOPHILS NFR BLD MANUAL: 0 %
BILIRUB DIRECT SERPL-MCNC: 0.1 MG/DL (ref 0–0.3)
BILIRUB SERPL-MCNC: 0.4 MG/DL (ref 0–1.2)
BUN SERPL-MCNC: 41 MG/DL (ref 6–23)
BUN SERPL-MCNC: 42 MG/DL (ref 6–23)
BURR CELLS BLD QL SMEAR: ABNORMAL
CALCIUM SERPL-MCNC: 7.5 MG/DL (ref 8.6–10.6)
CALCIUM SERPL-MCNC: 7.7 MG/DL (ref 8.6–10.6)
CELL COUNT (BLOOD): 37.87 X10*3/UL
CELL POPULATIONS: NORMAL
CHLORIDE SERPL-SCNC: 111 MMOL/L (ref 98–107)
CHLORIDE SERPL-SCNC: 113 MMOL/L (ref 98–107)
CO2 SERPL-SCNC: 16 MMOL/L (ref 21–32)
CO2 SERPL-SCNC: 19 MMOL/L (ref 21–32)
CREAT SERPL-MCNC: 1.52 MG/DL (ref 0.5–1.05)
CREAT SERPL-MCNC: 1.57 MG/DL (ref 0.5–1.05)
DIAGNOSIS: NORMAL
EGFRCR SERPLBLD CKD-EPI 2021: 35 ML/MIN/1.73M*2
EGFRCR SERPLBLD CKD-EPI 2021: 37 ML/MIN/1.73M*2
EOSINOPHIL # BLD MANUAL: 0 X10*3/UL (ref 0–0.4)
EOSINOPHIL NFR BLD MANUAL: 0 %
ERYTHROCYTE [DISTWIDTH] IN BLOOD BY AUTOMATED COUNT: 15.4 % (ref 11.5–14.5)
FIBRINOGEN PPP-MCNC: 187 MG/DL (ref 200–400)
FLOW DIFFERENTIAL: NORMAL
FLOW TEST ORDERED: NORMAL
GLUCOSE SERPL-MCNC: 137 MG/DL (ref 74–99)
GLUCOSE SERPL-MCNC: 155 MG/DL (ref 74–99)
HCT VFR BLD AUTO: 27.2 % (ref 36–46)
HGB BLD-MCNC: 8 G/DL (ref 12–16)
IMM GRANULOCYTES # BLD AUTO: 0.74 X10*3/UL (ref 0–0.5)
IMM GRANULOCYTES NFR BLD AUTO: 1.6 % (ref 0–0.9)
LAB TEST METHOD: NORMAL
LDH SERPL L TO P-CCNC: 3872 U/L (ref 84–246)
LDH SERPL L TO P-CCNC: 4945 U/L (ref 84–246)
LYMPHOCYTES # BLD MANUAL: 6.58 X10*3/UL (ref 0.8–3)
LYMPHOCYTES NFR BLD MANUAL: 14.3 %
MAGNESIUM SERPL-MCNC: 2.14 MG/DL (ref 1.6–2.4)
MCH RBC QN AUTO: 28.8 PG (ref 26–34)
MCHC RBC AUTO-ENTMCNC: 29.4 G/DL (ref 32–36)
MCV RBC AUTO: 98 FL (ref 80–100)
MONOCYTES # BLD MANUAL: 0.83 X10*3/UL (ref 0.05–0.8)
MONOCYTES NFR BLD MANUAL: 1.8 %
NEUTROPHILS # BLD MANUAL: 21.75 X10*3/UL (ref 1.6–5.5)
NEUTS BAND # BLD MANUAL: 0.41 X10*3/UL (ref 0–0.5)
NEUTS BAND NFR BLD MANUAL: 0.9 %
NEUTS SEG # BLD MANUAL: 21.34 X10*3/UL (ref 1.6–5)
NEUTS SEG NFR BLD MANUAL: 46.4 %
NRBC BLD-RTO: 0.3 /100 WBCS (ref 0–0)
NUMBER OF CELLS COLLECTED: NORMAL
P AXIS: 29 DEGREES
P AXIS: 47 DEGREES
P OFFSET: 183 MS
P OFFSET: 193 MS
P ONSET: 145 MS
P ONSET: 161 MS
PATH REPORT.TOTAL CANCER: NORMAL
PHOSPHATE SERPL-MCNC: 3.2 MG/DL (ref 2.5–4.9)
PHOSPHATE SERPL-MCNC: 3.9 MG/DL (ref 2.5–4.9)
PLATELET # BLD AUTO: 144 X10*3/UL (ref 150–450)
POLYCHROMASIA BLD QL SMEAR: ABNORMAL
POTASSIUM SERPL-SCNC: 4.9 MMOL/L (ref 3.5–5.3)
POTASSIUM SERPL-SCNC: 5 MMOL/L (ref 3.5–5.3)
PR INTERVAL: 128 MS
PR INTERVAL: 146 MS
PROT SERPL-MCNC: 4.3 G/DL (ref 6.4–8.2)
Q ONSET: 215 MS
Q ONSET: 218 MS
QRS COUNT: 20 BEATS
QRS COUNT: 20 BEATS
QRS DURATION: 80 MS
QRS DURATION: 84 MS
QT INTERVAL: 312 MS
QT INTERVAL: 322 MS
QTC CALCULATION(BAZETT): 440 MS
QTC CALCULATION(BAZETT): 457 MS
QTC FREDERICIA: 393 MS
QTC FREDERICIA: 406 MS
R AXIS: -7 DEGREES
R AXIS: 1 DEGREES
RBC # BLD AUTO: 2.78 X10*6/UL (ref 4–5.2)
RBC MORPH BLD: ABNORMAL
SIGNATURE COMMENT: NORMAL
SODIUM SERPL-SCNC: 135 MMOL/L (ref 136–145)
SODIUM SERPL-SCNC: 139 MMOL/L (ref 136–145)
SPECIMEN VIABILITY: NORMAL
T AXIS: 32 DEGREES
T AXIS: 51 DEGREES
T OFFSET: 371 MS
T OFFSET: 379 MS
TOTAL CELLS COUNTED BLD: 112
URATE SERPL-MCNC: 5.1 MG/DL (ref 2.3–6.7)
URATE SERPL-MCNC: 5.7 MG/DL (ref 2.3–6.7)
VARIANT LYMPHS # BLD MANUAL: 16.84 X10*3/UL (ref 0–0.3)
VARIANT LYMPHS NFR BLD: 36.6 %
VENTRICULAR RATE: 120 BPM
VENTRICULAR RATE: 121 BPM
WBC # BLD AUTO: 46 X10*3/UL (ref 4.4–11.3)

## 2024-06-14 PROCEDURE — 82248 BILIRUBIN DIRECT: CPT

## 2024-06-14 PROCEDURE — 80069 RENAL FUNCTION PANEL: CPT | Mod: CCI

## 2024-06-14 PROCEDURE — A9552 F18 FDG: HCPCS | Performed by: STUDENT IN AN ORGANIZED HEALTH CARE EDUCATION/TRAINING PROGRAM

## 2024-06-14 PROCEDURE — 2500000004 HC RX 250 GENERAL PHARMACY W/ HCPCS (ALT 636 FOR OP/ED): Performed by: NURSE PRACTITIONER

## 2024-06-14 PROCEDURE — 82374 ASSAY BLOOD CARBON DIOXIDE: CPT

## 2024-06-14 PROCEDURE — 83615 LACTATE (LD) (LDH) ENZYME: CPT | Performed by: PHYSICIAN ASSISTANT

## 2024-06-14 PROCEDURE — 85027 COMPLETE CBC AUTOMATED: CPT

## 2024-06-14 PROCEDURE — 93005 ELECTROCARDIOGRAM TRACING: CPT

## 2024-06-14 PROCEDURE — 83735 ASSAY OF MAGNESIUM: CPT

## 2024-06-14 PROCEDURE — 85007 BL SMEAR W/DIFF WBC COUNT: CPT

## 2024-06-14 PROCEDURE — 84100 ASSAY OF PHOSPHORUS: CPT

## 2024-06-14 PROCEDURE — 84550 ASSAY OF BLOOD/URIC ACID: CPT | Performed by: PHYSICIAN ASSISTANT

## 2024-06-14 PROCEDURE — 78815 PET IMAGE W/CT SKULL-THIGH: CPT | Mod: PI

## 2024-06-14 PROCEDURE — 1170000001 HC PRIVATE ONCOLOGY ROOM DAILY

## 2024-06-14 PROCEDURE — 78815 PET IMAGE W/CT SKULL-THIGH: CPT | Mod: PET TUMOR INIT TX STRAT | Performed by: NUCLEAR MEDICINE

## 2024-06-14 PROCEDURE — 99233 SBSQ HOSP IP/OBS HIGH 50: CPT | Performed by: INTERNAL MEDICINE

## 2024-06-14 PROCEDURE — 3430000001 HC RX 343 DIAGNOSTIC RADIOPHARMACEUTICALS: Performed by: STUDENT IN AN ORGANIZED HEALTH CARE EDUCATION/TRAINING PROGRAM

## 2024-06-14 PROCEDURE — C9113 INJ PANTOPRAZOLE SODIUM, VIA: HCPCS | Performed by: NURSE PRACTITIONER

## 2024-06-14 PROCEDURE — 84550 ASSAY OF BLOOD/URIC ACID: CPT | Mod: 91

## 2024-06-14 PROCEDURE — 2500000001 HC RX 250 WO HCPCS SELF ADMINISTERED DRUGS (ALT 637 FOR MEDICARE OP)

## 2024-06-14 PROCEDURE — 2500000004 HC RX 250 GENERAL PHARMACY W/ HCPCS (ALT 636 FOR OP/ED)

## 2024-06-14 PROCEDURE — 83615 LACTATE (LD) (LDH) ENZYME: CPT | Mod: 91

## 2024-06-14 PROCEDURE — 85384 FIBRINOGEN ACTIVITY: CPT | Performed by: PHYSICIAN ASSISTANT

## 2024-06-14 RX ORDER — FLUDEOXYGLUCOSE F 18 200 MCI/ML
12.9 INJECTION, SOLUTION INTRAVENOUS
Status: COMPLETED | OUTPATIENT
Start: 2024-06-14 | End: 2024-06-14

## 2024-06-14 RX ORDER — PANTOPRAZOLE SODIUM 40 MG/10ML
40 INJECTION, POWDER, LYOPHILIZED, FOR SOLUTION INTRAVENOUS ONCE
Status: COMPLETED | OUTPATIENT
Start: 2024-06-14 | End: 2024-06-14

## 2024-06-14 ASSESSMENT — COGNITIVE AND FUNCTIONAL STATUS - GENERAL
MOBILITY SCORE: 24
DAILY ACTIVITIY SCORE: 24
DAILY ACTIVITIY SCORE: 24
MOBILITY SCORE: 24

## 2024-06-14 ASSESSMENT — PAIN - FUNCTIONAL ASSESSMENT
PAIN_FUNCTIONAL_ASSESSMENT: 0-10

## 2024-06-14 ASSESSMENT — PAIN SCALES - GENERAL
PAINLEVEL_OUTOF10: 0 - NO PAIN

## 2024-06-14 NOTE — NURSING NOTE
V/s stable overnight. Denies n/v/d. She has been npo since midnight for a lymph node biospy. She has been steady on ambulation while up to the bathroom.

## 2024-06-14 NOTE — CARE PLAN
Problem: Safety  Goal: I will remain free of falls  Outcome: Progressing     Problem: Pain  Goal: My pain/discomfort is manageable  Outcome: Progressing     Problem: Infection related to problem list condition  Goal: Infection will resolve through treatment  Outcome: Progressing     Problem: Fall/Injury  Goal: Not fall by end of shift  Outcome: Progressing     Problem: Fall/Injury  Goal: Be free from injury by end of the shift  Outcome: Progressing     Problem: Fall/Injury  Goal: Verbalize understanding of personal risk factors for fall in the hospital  Outcome: Progressing     Problem: Fall/Injury  Goal: Verbalize understanding of risk factor reduction measures to prevent injury from fall in the home  Outcome: Progressing   The clinical goals for the shift include patient will be free from falls and injury this shift

## 2024-06-14 NOTE — SIGNIFICANT EVENT
ENT team had plan to take patient to the operating room today for lymph node biopsy.  Unfortunately this was not able to be facilitated due to the OR and staff availability.  We will have to tentatively plan to have this done on Monday.    -Okay for p.o. diet today from ENT standpoint  -Please plan to make n.p.o. on Sunday night    Houston Gregorio MD PGY3  ENT

## 2024-06-14 NOTE — PROGRESS NOTES
"Jailene Lacy is a 73 y.o. female on day 3 of admission presenting with Acute leukemia (Multi).    Subjective   No acute events over night. Afebrile. NPO this today for PET CT and awaiting to go to OR with ENT. Reports that she feels that the neck mass is slightly smaller when she looked in the mirror.  Denies SOB, CP abd pain. Remaining ROS negative.        Objective     Physical Exam  Constitutional:       General: She is not in acute distress.  HENT:      Head: Normocephalic.      Mouth/Throat:      Mouth: Mucous membranes are moist.      Comments: Tongue lesions present   Eyes:      Extraocular Movements: Extraocular movements intact.      Pupils: Pupils are equal, round, and reactive to light.   Neck:      Comments: Several enlarged lymph nodes, L > R   Cardiovascular:      Rate and Rhythm: Normal rate and regular rhythm.      Pulses: Normal pulses.      Heart sounds: Normal heart sounds.   Pulmonary:      Effort: Pulmonary effort is normal. No respiratory distress.      Breath sounds: No wheezing or rhonchi.   Abdominal:      General: Bowel sounds are normal. There is no distension.      Palpations: Abdomen is soft.   Musculoskeletal:         General: Normal range of motion.      Cervical back: Normal range of motion.   Lymphadenopathy:      Cervical: Cervical adenopathy present.   Skin:     General: Skin is warm and dry.      Capillary Refill: Capillary refill takes less than 2 seconds.   Neurological:      Mental Status: She is alert and oriented to person, place, and time.         Last Recorded Vitals  Blood pressure 111/63, pulse 104, temperature 36.6 °C (97.9 °F), temperature source Temporal, resp. rate 18, height 1.694 m (5' 6.69\"), weight 58.9 kg (129 lb 13.6 oz), SpO2 97%.  Intake/Output last 3 Shifts:  I/O last 3 completed shifts:  In: 3070 (52.1 mL/kg) [I.V.:3070 (52.1 mL/kg)]  Out: - (0 mL/kg)   Weight: 58.9 kg     Relevant Results  Scheduled medications  acyclovir, 400 mg, oral, q12h " ADDI  allopurinol, 300 mg, oral, BID  dexAMETHasone, 4 mg, oral, q12h ADDI  [Held by provider] heparin (porcine), 5,000 Units, subcutaneous, q8h  hydroCHLOROthiazide, 12.5 mg, oral, Daily  lidocaine, 5 mL, infiltration, Once  metoprolol tartrate, 75 mg, oral, BID  pantoprazole, 40 mg, oral, Daily before breakfast  perflutren protein A microsphere, 0.5 mL, intravenous, Once in imaging  sulfur hexafluoride microsphr, 2 mL, intravenous, Once in imaging      Continuous medications  sodium chloride 0.9%, 100 mL/hr, Last Rate: 100 mL/hr (06/13/24 2621)      PRN medications  PRN medications: alteplase, alteplase, LORazepam    Results for orders placed or performed during the hospital encounter of 06/11/24 (from the past 24 hour(s))   Renal function panel   Result Value Ref Range    Glucose 178 (H) 74 - 99 mg/dL    Sodium 136 136 - 145 mmol/L    Potassium 4.6 3.5 - 5.3 mmol/L    Chloride 110 (H) 98 - 107 mmol/L    Bicarbonate 17 (L) 21 - 32 mmol/L    Anion Gap 14 10 - 20 mmol/L    Urea Nitrogen 40 (H) 6 - 23 mg/dL    Creatinine 1.52 (H) 0.50 - 1.05 mg/dL    eGFR 37 (L) >60 mL/min/1.73m*2    Calcium 7.9 (L) 8.6 - 10.6 mg/dL    Phosphorus 4.2 2.5 - 4.9 mg/dL    Albumin 3.1 (L) 3.4 - 5.0 g/dL   Lactate dehydrogenase   Result Value Ref Range    LDH 4,225 (H) 84 - 246 U/L   Uric Acid   Result Value Ref Range    Uric Acid 6.2 2.3 - 6.7 mg/dL   Magnesium   Result Value Ref Range    Magnesium 2.14 1.60 - 2.40 mg/dL   Uric Acid   Result Value Ref Range    Uric Acid 5.7 2.3 - 6.7 mg/dL   Lactate Dehydrogenase   Result Value Ref Range    LDH 3,872 (H) 84 - 246 U/L   Fibrinogen   Result Value Ref Range    Fibrinogen 187 (L) 200 - 400 mg/dL   Hepatic Function Panel   Result Value Ref Range    Albumin 2.8 (L) 3.4 - 5.0 g/dL    Bilirubin, Total 0.4 0.0 - 1.2 mg/dL    Bilirubin, Direct 0.1 0.0 - 0.3 mg/dL    Alkaline Phosphatase 78 33 - 136 U/L    ALT 21 7 - 45 U/L    AST 85 (H) 9 - 39 U/L    Total Protein 4.3 (L) 6.4 - 8.2 g/dL   CBC and  Auto Differential   Result Value Ref Range    WBC 46.0 (H) 4.4 - 11.3 x10*3/uL    nRBC 0.3 (H) 0.0 - 0.0 /100 WBCs    RBC 2.78 (L) 4.00 - 5.20 x10*6/uL    Hemoglobin 8.0 (L) 12.0 - 16.0 g/dL    Hematocrit 27.2 (L) 36.0 - 46.0 %    MCV 98 80 - 100 fL    MCH 28.8 26.0 - 34.0 pg    MCHC 29.4 (L) 32.0 - 36.0 g/dL    RDW 15.4 (H) 11.5 - 14.5 %    Platelets 144 (L) 150 - 450 x10*3/uL    Immature Granulocytes %, Automated 1.6 (H) 0.0 - 0.9 %    Immature Granulocytes Absolute, Automated 0.74 (H) 0.00 - 0.50 x10*3/uL   Phosphorus   Result Value Ref Range    Phosphorus 3.9 2.5 - 4.9 mg/dL   Basic Metabolic Panel   Result Value Ref Range    Glucose 155 (H) 74 - 99 mg/dL    Sodium 135 (L) 136 - 145 mmol/L    Potassium 5.0 3.5 - 5.3 mmol/L    Chloride 111 (H) 98 - 107 mmol/L    Bicarbonate 16 (L) 21 - 32 mmol/L    Anion Gap 13 10 - 20 mmol/L    Urea Nitrogen 42 (H) 6 - 23 mg/dL    Creatinine 1.57 (H) 0.50 - 1.05 mg/dL    eGFR 35 (L) >60 mL/min/1.73m*2    Calcium 7.5 (L) 8.6 - 10.6 mg/dL   Manual Differential   Result Value Ref Range    Neutrophils %, Manual 46.4 40.0 - 80.0 %    Bands %, Manual 0.9 0.0 - 5.0 %    Lymphocytes %, Manual 14.3 13.0 - 44.0 %    Monocytes %, Manual 1.8 2.0 - 10.0 %    Eosinophils %, Manual 0.0 0.0 - 6.0 %    Basophils %, Manual 0.0 0.0 - 2.0 %    Atypical Lymphocytes %, Manual 36.6 0.0 - 2.0 %    Seg Neutrophils Absolute, Manual 21.34 (H) 1.60 - 5.00 x10*3/uL    Bands Absolute, Manual 0.41 0.00 - 0.50 x10*3/uL    Lymphocytes Absolute, Manual 6.58 (H) 0.80 - 3.00 x10*3/uL    Monocytes Absolute, Manual 0.83 (H) 0.05 - 0.80 x10*3/uL    Eosinophils Absolute, Manual 0.00 0.00 - 0.40 x10*3/uL    Basophils Absolute, Manual 0.00 0.00 - 0.10 x10*3/uL    Atypical Lymphs Absolute, Manual 16.84 (H) 0.00 - 0.30 x10*3/uL    Total Cells Counted 112     Neutrophils Absolute, Manual 21.75 (H) 1.60 - 5.50 x10*3/uL    RBC Morphology See Below     Polychromasia Mild     Deirdre Cells Few     Acanthocytes Few      NM PET CT  lymphoma staging    Result Date: 6/14/2024  Interpreted By:  Emile Perea and Bera Kaustav STUDY: NM PET CT LYMPHOMA STAGING;  6/14/2024 10:25 am   INDICATION: Signs/Symptoms:Lymphoma staging. DLBCL versus high-grade lymphoma. Bone marrow biopsy pending.   COMPARISON: None.   ACCESSION NUMBER(S): [id number]   ORDERING CLINICIAN: PEPE DUGAN   TECHNIQUE: DIVISION OF NUCLEAR MEDICINE POSITRON EMISSION TOMOGRAPHY (PET-CT)   The patient received an intravenous dose of 12.9 mCi of Fluorine-18 fluorodeoxyglucose (FDG).  Positron emission tomographic (PET) images from mid thigh to vertex of the head  were then acquired after a one hour delay. Also acquired was a contemporaneous low dose non-contrast CT scan performed for attenuation correction of PET images and anatomic localization.  The PET and CT images were digitally fused for display.  All images were acquired on a combined PET-CT scanner unit.  Some areas of FDG accumulation may be described in standardized uptake value (SUV) units.   CODING: Initial Treatment Strategy (PI)   CALIBRATION: Dose Injection-to-Scan Interval (mins): 71 min Mediastinal bloodpool SUV (normal 1.5-2.5): 2.0 Blood glucose: 155 mg/dL   FINDINGS: HEAD AND NECK: No evidence of focal hypermetabolic lesion in the partially visualized brain parenchyma, noting that evaluation is limited because of the expected physiologic diffuse FDG uptake in the brain. There is diffuse hypermetabolic activity involving the region of the left parotid gland with SUV max of 8.0. There are multiple enlarged and hypermetabolic cervical lymph nodes involving level 1 and 2 with SUV max of 4.1.     CHEST: No focal hypermetabolic lesion is seen in the lung parenchyma. There are multiple hypermetabolic supraclavicular, axillary, mediastinal and hilar lymph nodes. For instance, there is a hypermetabolic right supraclavicular lymph node with SUV max of 5.6, bilateral axillary lymphadenopathy with SUV max of 2.6 on the  right and 3.2 on the left, right upper paratracheal lymph node with SUV max of 2.9 right lower paratracheal lymph node with SUV max of 3.4 right hilar lymph node with SUV max of 4.1 left hilar lymph node with SUV max of 3.4, subcarinal lymph node with SUV max of 4.2, prominent right internal mammary lymph node with SUV max of 3.7 left internal mammary lymph node with SUV max of 2.3.. Small bilateral pleural effusions with consolidative opacities seen within the left lower lobe without significantly increased hypermetabolic activity. Mildly hypermetabolic subcutaneous soft tissue nodules overlying the right anterior 5th rib.   ABDOMEN AND PELVIS: No hypermetabolic soft tissue lesion is present in the abdomen and pelvis. There are enlarged mesenteric lymph nodes with SUV max of 5.3. there are multiple enlarged pelvic sidewall lymph nodes with SUV max of 3.5 at the right external iliac lymph node station and 3.6 on the left. There are multiple enlarged and hypermetabolic inguinal lymph nodes SUV max of 3.3 on the left and 3.6 on the right. Splenomegaly with diffusely increased FDG uptake within the spleen as compared to the liver. Otherwise, physiologic radiotracer uptake is present in the liver and spleen with excretion into the bowel loops and the genitourinary tract. Left adnexal cystic lesion is visualized, without FDG avidity. Moderate free fluid is seen within the pelvis.   MUSCULOSKELETAL: No focal hypermetabolic lesion is seen in the axial or appendicular to suggest osseous metastasis. Diffusely increased FDG uptake is seen in the bone marrow, possible representing marrow hyperplasia related to the patient's history of anaemia versus lymphomatous involvement.       1. Innumerable hypermetabolic cervical, abdominopelvic lymph nodes consistent with an active lymphomatous process. 2. Splenomegaly with diffusely increased activity throughout the spleen, is compatible with splenic involvement. 3. Diffusely increased  FDG uptake is seen in the bone marrow, possible representing marrow hyperplasia related to the patient's history of anaemia but is concerning for lymphomatous involvement. Consider correlation with bone marrow biopsy.     I personally reviewed the image(s) / study and agree with the findings and interpretation as stated. This study was interpreted at [facility].   Signed by: Emile Perea 6/14/2024 12:51 PM Dictation workstation:   [id number]    Bedside PICC Imaging    Result Date: 6/12/2024  These images are not reportable by radiology and will not be interpreted by  Radiologists.    Onco-Echo Complete (Strain & 3D)    Result Date: 6/12/2024   [facility], [address] [place] [address]                Tel [phone] and Fax [phone] TRANSTHORACIC ECHOCARDIOGRAM REPORT  Patient Name:      SANDOVAL MORALES        Reading Physician:    [id number] Vargas Karimi MD Study Date:        6/12/2024            Ordering Provider:    [id number] PEPE DUGAN MRN/PID:           [id number]             Fellow:               [id number] Lisa Rodriguez MD Accession#:        [id number]         Nurse:                Mian Rubio RN Date of Birth/Age: 1950 / 73 years Sonographer:          Shantel Stallworth RDCS Gender:            F                    Additional Staff: Height:                                 Admit Date:           6/10/2024 Weight:                                 Admission Status:     Inpatient -                                                               Routine BSA / BMI:         m2 / kg/m2           Encounter#:           [id number]                                         Department Location:  [place] [address] Blood Pressure: 93 /54 mmHg Study Type:    ONCO-ECHO COMPLETE (STRAIN AND  3D) Diagnosis/ICD: Encounter for other preprocedural examination-Z01.818 Indication:    Acute leukemia CPT Code:      Echo Complete w Full Doppler-16171; 3D Rendering w/o independent                workstation-[id number]; Myocardial Strain Imaging-57058 Patient History: Pertinent History: Acute leukemia; HTN. Study Detail: The following Echo studies were performed: 2D, M-Mode, Doppler,               color flow, Strain and 3D. Technically challenging study due to               body habitus. Definity used as a contrast agent for endocardial               border definition. Total contrast used for this procedure was 1 mL               via IV push.  PHYSICIAN INTERPRETATION: Left Ventricle: The left ventricular systolic function is hyperdynamic, with an estimated ejection fraction of 70-75%. There are no regional wall motion abnormalities. The left ventricular cavity size is normal. The left ventricular septal wall thickness is normal. There is normal left ventricular posterior wall thickness. Left Ventricular Global Longitudinal Strain - 17.2 %. Spectral Doppler shows a normal pattern of left ventricular diastolic filling. Left Atrium: The left atrium is normal in size. Right Ventricle: The right ventricle is normal in size. There is normal right ventricular global systolic function. Right Atrium: The right atrium is normal in size. Aortic Valve: The aortic valve is trileaflet. There is no evidence of aortic valve regurgitation. The peak instantaneous gradient of the aortic valve is 7.2 mmHg. Mitral Valve: The mitral valve is normal in structure. There is mild mitral annular calcification. There is trace mitral valve regurgitation. Tricuspid Valve: The tricuspid valve is structurally normal. There is trace tricuspid regurgitation. The Doppler estimated RVSP is within normal limits at 29.4 mmHg. Pulmonic Valve: The pulmonic valve is not well visualized. There is physiologic pulmonic valve regurgitation. Pericardium: There is  a trivial pericardial effusion. Aorta: The aortic root is normal. The aortic root appears normal in size and measures 3.30 cm. There is no dilatation of the ascending aorta. Systemic Veins: The inferior vena cava appears to be of normal size. There is IVC inspiratory collapse greater than 50%. In comparison to the previous echocardiogram(s): There are no prior studies on this patient for comparison purposes.  ONCO-CARDIOLOGY: Vital Signs: The patients heart rate during the study was 109 beats per minute. The patients blood pressure was 93/54 during the study. Machine: This study was performed on the [employer] Epic. Comments: The LV strain is likely underestimated due to poor tracking.  Onco-Cardiology Measurements: Current Measurements 2D EF (Biplane)                   70.6% 3D EF                             70.2% Global Longitudinal Strain (GLS) -17.2% GLS Tracking Quality: Poor  CONCLUSIONS:  1. Left ventricular systolic function is hyperdynamic with a 70-75% estimated ejection fraction.  2. RVSP within normal limits.  3. Normal aortic root.  4. LV global longitudinal strain is -17.2% but likely underestimated due to poor tracking. QUANTITATIVE DATA SUMMARY: 2D MEASUREMENTS:                    Normal Ranges: Ao Root d: 3.30 cm (2.0-3.7cm) IVSd:      0.70 cm (0.6-1.1cm) LVPWd:     0.70 cm (0.6-1.1cm) LVIDd:     4.00 cm (3.9-5.9cm) LVIDs:     2.80 cm LV % FS    30.0 % LA VOLUME:                             Normal Ranges: LA Vol A4C:        32.3 ml  (22+/-6mL/m2) LA Area A4C:       13.2 cm2 LA Major Axis A4C: 4.6 cm RA VOLUME BY A/L METHOD:                       Normal Ranges: RA Area A4C: 13.8 cm2 LV SYSTOLIC FUNCTION BY 2D PLANIMETRY (MOD):                                          Normal Ranges: EF-A4C View:                      73.6 % (>=55%) EF-A2C View:                      74.9 % EF-Biplane:                       74.2 % Global Longitudinal Strain (GLS): 17.2 % LV DIASTOLIC FUNCTION:                                Normal Ranges: MV Peak E:        0.71 m/s    (0.7-1.2 m/s) MV Peak A:        0.55 m/s    (0.42-0.7 m/s) E/A Ratio:        1.29        (1.0-2.2) MV e'             0.12 m/s    (>8.0) MV lateral e'     0.11 m/s MV medial e'      0.12 m/s MV A Dur:         148.00 msec E/e' Ratio:       6.18        (<8.0) MV DT:            193 msec    (150-240 msec) PulmV Sys Praful:    56.10 cm/s PulmV Lund Praful:   43.70 cm/s PulmV S/D Praful:    1.30 PulmV A Revs Praful: 29.10 cm/s PulmV A Revs Dur: 124.00 msec MITRAL VALVE:                 Normal Ranges: MV DT: 193 msec (150-240msec) AORTIC VALVE:                         Normal Ranges: AoV Vmax:      1.34 m/s (<=1.7m/s) AoV Peak P.2 mmHg (<20mmHg) LVOT Max Praful:  1.12 m/s (<=1.1m/s) LVOT VTI:      25.50 cm LVOT Diameter: 1.90 cm  (1.8-2.4cm) AoV Area,Vmax: 2.37 cm2 (2.5-4.5cm2)  RIGHT VENTRICLE: RV Basal 3.90 cm RV Mid   3.38 cm RV Major 6.6 cm TAPSE:   22.2 mm RV s'    0.20 m/s TRICUSPID VALVE/RVSP:                             Normal Ranges: Peak TR Velocity: 2.57 m/s RV Syst Pressure: 29.4 mmHg (< 30mmHg) IVC Diam:         1.38 cm PULMONIC VALVE:                         Normal Ranges: PV Accel Time: 85 msec  (>120ms) PV Max Praful:    0.9 m/s  (0.6-0.9m/s) PV Max PG:     3.4 mmHg Pulmonary Veins: PulmV A Revs Dur: 124.00 msec PulmV A Revs Praful: 29.10 cm/s PulmV Lund Praful:   43.70 cm/s PulmV S/D Praful:    1.30 PulmV Sys Praful:    56.10 cm/s  [id number] Vargas Karimi MD Electronically signed on 2024 at 12:04:55 PM  ** Final **     US head neck soft tissue    Result Date: 6/10/2024  EXAMINATION: THYROID ULTRASOUND 6/10/2024 COMPARISON: CT neck from the same day. HISTORY: ORDERING SYSTEM PROVIDED HISTORY: abnormal imaging, concern for thyroid nodule/mass TECHNOLOGIST PROVIDED HISTORY: Reason for exam:->abnormal imaging, concern for thyroid nodule/mass What reading provider will be dictating this exam?->CRC FINDINGS: Right thyroid lobe: 4.2 x 1.3 x 1.5cm Left thyroid lobe: 5.6 x 2.4 x 2.2cm Isthmus:  0.4cm Echotexture: Diffuse thyroid heterogeneity. Vascularity: Normal vascularity. Thyroid Nodules: Bilateral thyroid nodules.  Largest and/or most concerning nodules will be described.  The largest nodule on the right measures 14 mm and has a spongiform (TR 2) appearance. NODULE: Left 1 Size: 23 x 21 x 20 mm Location: Left midpole 1. Composition:  Solid (2) 2. Echogenicity:  Hypoechoic (2) 3. Shape:  Wider-than-tall (0) 4. Margins:  Ill-defined (0) 5. Echogenic foci:  Punctate echogenic foci (3) ACR TI-RADS total points:  7 ACR TI-RADS risk category: TR 5 Prior biopsy: No NODULE: Left 2 Size: 15 x 13 x 12 mm Location: Left lower pole 1. Composition:  Solid (2) 2. Echogenicity:  Hypoechoic (2) 3. Shape:  Wider-than-tall (0) 4. Margins:  Ill-defined (0) 5. Echogenic foci:  Punctate echogenic foci (3) ACR TI-RADS total points:  7 ACR TI-RADS risk category: TR 5 Prior biopsy: No Soft tissues: There are multiple lymph nodes in the right and left neck soft tissues.  The lymph nodes have a markedly abnormal appearance with very thickened cortices.  Please reference CT neck report from the same day.    1.  Mild thyroid heterogeneity.  Normal vascularity. 2.  Bilateral thyroid nodules.  The nodules described above on the left meet criteria for FNA.  Left midpole TR 5 nodule and left lower pole TR 5 nodule. 3.  Abnormal lymphadenopathy partially evaluated on this study.  Left and right neck lymph nodes with very thickened cortices.  Please reference CT neck report from the same day. ACR TI-RADS recommendations: TR5 (>= 7 points):  FNA if >= 1 cm; follow-up if 0.5-0.9 cm in 1, 2, 3, 4, and 5 years TR4 (4-6 points):  FNA if >= 1.5 cm; follow-up if 1.0-1.4 cm in 1, 2, 3, and 5 years TR3 (3 points):  FNA if >= 2.5 cm; follow-up if 1.5-2.4 cm in 1, 3, and 5 years TR2 (2 points):  No FNA or follow-up TR1 (0 points):  No FNA or follow-up ACR TI-RADS recommends that no more than two nodules with the highest ACR TI-RADS point total  should be biopsied and no more than four nodules should be followed. RECOMMENDATIONS: Recommend ultrasound-guided FNA tissue evaluation of the left midpole TR 5 nodule measuring 23 mm and seen on image 51.  Recommend FNA tissue evaluation of the left lower pole TR 5 nodule measuring 15 mm seen on image 55. Consider FNA tissue evaluation of lymph nodes in the right and left neck soft tissues at that time.    CT chest wo IV contrast    Result Date: 6/10/2024  EXAMINATION: CT OF THE CHEST WITHOUT CONTRAST 6/10/2024 1:13 pm TECHNIQUE: CT of the chest was performed without the administration of intravenous contrast. Multiplanar reformatted images are provided for review. Automated exposure control, iterative reconstruction, and/or weight based adjustment of the mA/kV was utilized to reduce the radiation dose to as low as reasonably achievable. COMPARISON: CT of the abdomen and pelvis from earlier today. HISTORY: ORDERING SYSTEM PROVIDED HISTORY: ro mets TECHNOLOGIST PROVIDED HISTORY: Reason for exam:->ro mets Decision Support Exception - unselect if not a suspected or confirmed emergency medical condition->Emergency Medical Condition (MA) FINDINGS: Mediastinum: There is prominent mediastinal lymphadenopathy with a precarinal lymph node measuring 1.9 cm in short axis diameter.  There is lymphadenopathy in the right paratracheal, subcarinal, and periaortic regions. There is fullness of the colby bilaterally suggesting hilar adenopathy. Absence of intravenous contrast limits sensitivity for hilar adenopathy. There is thickening of the wall of the inferior thoracic esophagus, reflux esophagitis versus esophageal malignancy.  Suggest correlation with EGD. heart and pericardium are unremarkable. Lungs/pleura: There is a 3 mm subpleural nodule in the anterior-lateral right lower lobe towards the lung base, axial image 88 series 4. There is a tiny 2 mm noncalcified subpleural nodule in the anterior-lateral right middle lobe,  axial image 60 series 4. These are nonspecific and are most likely scarring from previous inflammatory disease, although malignancy cannot be entirely excluded. There is mild patchy infiltrate in the posterior and lateral left lung base, atelectasis versus pneumonia versus scarring. There is mild patchy subpleural scarring in the apices from previous inflammatory disease. Upper Abdomen: Please see the preceding CT of the abdomen and pelvis report regarding significant findings in the upper abdomen. Soft Tissues/Bones: There is moderate bilateral axillary lymphadenopathy, the largest right-sided lymph node having a short axis diameter of 2.1 cm and the largest lymph node on the left having short axis diameter of 2.6 cm. There is a cystic structure in the lower pole of the left lobe of the thyroid measuring 2.0 cm in diameter with additional smaller cystic regions in the upper pole.  These could be further evaluated with ultrasound on a nonemergent basis. There is moderate scoliosis of the thoracic spine convex towards the right.    1. Prominent mediastinal and bilateral axillary lymphadenopathy. This is concerning for lymphoma or metastatic disease. 2. Thickening of the wall of the inferior thoracic esophagus, reflux esophagitis versus esophageal malignancy. Suggest correlation with EGD. 3. Mild patchy infiltrate in the posterior and lateral left lung base, atelectasis versus pneumonia versus scarring. 4. Tiny noncalcified subpleural nodules in the right middle and right lower lobes, most likely scarring from previous inflammatory disease, although malignancy cannot be entirely excluded. 5. Cystic structures in the left lobe of the thyroid. These could be further evaluated with ultrasound on a nonemergent basis. RECOMMENDATIONS: Suggest ultrasound of the thyroid on a nonemergent basis. Suggest EGD for evaluation of thickened wall of the inferior thoracic esophagus.    CT abdomen pelvis wo IV contrast    Result Date:  6/10/2024  EXAMINATION: CT OF THE ABDOMEN AND PELVIS WITHOUT CONTRAST6/10/2024 1:13 pm TECHNIQUE: CT of the abdomen and pelvis was performed without the administration of intravenous contrast. Multiplanar reformatted images are provided for review. Automated exposure control, iterative reconstruction, and/or weight based adjustment of the mA/kV was utilized to reduce the radiation dose to as low as reasonably achievable. COMPARISON: 09/22/2021. HISTORY: ORDERING SYSTEM PROVIDED HISTORY: ro mets, lymphadenopathy, anemia TECHNOLOGIST PROVIDED HISTORY: Reason for exam:->ro mets, lymphadenopathy, anemia Additional Contrast?->None Decision Support Exception - unselect if not a suspected or confirmed emergency medical condition->Emergency Medical Condition (MA) FINDINGS: Evaluation of the lung bases is deferred to the dedicated CT of the chest performed the same day.  Small left pleural effusion and left basilar airspace disease are present. Multiple hepatic cysts are again identified and are not significantly changed.  There is no gross evidence of new solid hepatic lesion although evaluation of the solid organs is limited secondary to lack of intravenous contrast.  There is new splenomegaly with the spleen measuring 13.8 x 10.0 by  16.5 cm.  The pancreas, kidneys, abdominal aorta, uterus and right adnexa have an unremarkable appearance.  There is a 4.1 x 3.1 cm cystic lesion in the left adnexa which measures 15 Hounsfield units and likely represents a simple cyst.  This is not significantly changed since the prior examination. There is a small to moderate amount of free fluid within the pelvis.  There is no evidence of large or small bowel obstruction or inflammation.  There is no evidence of a dilated vermiform appendix. There are numerous enlarged gastrohepatic ligament, gastroesophageal, carlos hepatic, portacaval, peripancreatic, left periaortic, anterior periaortic, interaortocaval retroperitoneal, bilateral common  iliac, mesentery, bilateral external iliac, , bilateral common femoral and left internal iliac lymph nodes.  There is a small fat containing left femoral hernia which contains a borderline enlarged lymph node.  Enlarged paracardiac lymph nodes are also identified. Bone windows reveal no evidence of acute fracture.  There is levoconvex scoliosis of the lumbar spine with moderate degenerative changes, particularly at L3-4 and L4-5.  No osteolytic or osteoblastic lesion is seen.    Splenomegaly with extensive abdominal and pelvic lymph node enlargement are consistent with lymphoma/leukemia or other metastatic disease. Small to moderate amount of free fluid in the pelvis. Stable 4.1 cm left adnexal cystic lesion.    CT soft tissue neck wo IV contrast    Result Date: 6/10/2024  EXAMINATION: CT OF THE NECK WITHOUT CONTRAST  6/10/2024 TECHNIQUE: CT of the neck was performed without the administration of intravenous contrast. Multiplanar reformatted images are provided for review. Automated exposure control, iterative reconstruction, and/or weight based adjustment of the mA/kV was utilized to reduce the radiation dose to as low as reasonably achievable. COMPARISON: None HISTORY: ORDERING SYSTEM PROVIDED HISTORY: lymphadenopathy, right submandibular and left preauricular TECHNOLOGIST PROVIDED HISTORY: Reason for exam:->lymphadenopathy, right submandibular and left preauricular Decision Support Exception - unselect if not a suspected or confirmed emergency medical condition->Emergency Medical Condition (MA) FINDINGS: PHARYNX/LARYNX:  The tonsillar pillars are normal in appearance.  The tongue is normal in appearance.  The valleculae, epiglottis, aryepiglottic folds and pyriform sinuses appear unremarkable.  The true and false vocal cords are normal in appearance.  No mass or abscess is seen. SALIVARY GLANDS/THYROID:  The parotid and submandibular glands appear unremarkable. There is a hypodense nodule within the left  lobe of the thyroid gland measuring 2.2 cm.  Thyroid ultrasound is suggested for further evaluation. LYMPH NODES:  Moderate diffuse bilateral cervical lymphadenopathy is identified involving the jugular chains as well as the submandibular region bilaterally.  The most prominent lymph node is located within the right supraclavicular region measuring 2 cm and within the left level 2 jugular chain measuring 2.3 cm.  The largest right submandibular lymph node measures 1.8 cm. SOFT TISSUES:  No appreciable soft tissue swelling or mass is seen. BRAIN/ORBITS/SINUSES:  The visualized portion of the intracranial contents appear unremarkable.  The visualized portion of the orbits, paranasal sinuses and mastoid air cells demonstrate no acute abnormality. LUNG APICES/SUPERIOR MEDIASTINUM:  No focal consolidation is seen within the visualized lung apices.  No superior mediastinal lymphadenopathy or mass. The visualized portion of the trachea appears unremarkable. BONES:  No aggressive appearing lytic or blastic bony lesion.    1. Moderate diffuse bilateral cervical lymphadenopathy.  These changes may be related to a lymphoproliferative disorder such as lymphoma.  Biopsy may be considered. 2. 2.2 cm left thyroid nodule.  Thyroid ultrasound is suggested for further evaluation.       Assessment/Plan   Principal Problem:    Acute leukemia (Multi)  Active Problems:    Tumor lysis syndrome ([facility])    Hypertension    Lymphadenopathy      Jailene Lacy is a 73 y.o. female with PMHx of HTN, HLD, presents to OSH with leukocytosis of 60k and diffuse lymphadenopathy of the face/neck who is being transferred to [facility] for further evaluation and treatment.      ONC:  # ?DLBCL vs high grade lymphoma   Presented with leukocytosis of 60k  -flow cytometry favoring lymphoid malignancy      - initiate dexamethasone 4mg q12h  -Onco-Echo EF 70-75%  -BMBx, pending  - Consult ENT for lymph node biopsy, planned for 6/14  - PET/CT: 1. Innumerable  hypermetabolic cervical, abdominopelvic lymph nodes  consistent with an active lymphomatous process.  2. Splenomegaly with diffusely increased activity throughout the  spleen, is compatible with splenic involvement.  3. Diffusely increased FDG uptake is seen in the bone marrow,  possible representing marrow hyperplasia related to the patient's  history of anaemia but is concerning for lymphomatous involvement.  Consider correlation with bone marrow biopsy.     HEME:  #Anemia, chronic  -hold home ferrous sulfate   - Transfuse for Hgb < 7 Plt < 10     Ppx: heparin 5000u q8h, on hold for OR      ID:  [id number]  -no evidence of active infection on admit  -covid swab on admit  -plan Zosyn/panculture/CXR if febrile     Ppx: acyclovir      FEN/GI:  Admit weight: 56.6 kg, current wt: 60.3 kg ()   Low path diet  #YRN, baseline sCr ~1.4-1.8, sCr today: 1.69  -mIVF NS at 100mL/hr  #Hyperuricemia, 2/2 TLS  Uric acid down-trendin.3 --> 8.9-->7.2-->5.7  -allopurinol 300mg BID  #Hyperkalemia, 2/2 TLS  -mIVF  #Tumor lysis syndrome  - monitor counts daily, fibrinogen, coags  - no evidence of DIC or hypercoag state  - TLS labs q12h       Ppx: pantoprazole     CARDS:  #Hypertension  -hold lisinopril 10 mg in setting of YRN  -cont home metoprolol 75 mg BID  -cont home hctz 12.5 mg daily     PSYCH:  #Anxiety  -Ativan 0.5mg q8h PRN     DISPO:  Full Code  DL SOLO PICC  Blood consent on admit  Needs primary oncologist       Pt seen and discussed with Dr Valerie Sanchez, APRN-CNP

## 2024-06-14 NOTE — CARE PLAN
The patient's goals for the shift include      The clinical goals for the shift include Patient will remain HDS this shift    Patient NPO for lymph node biopsy. Only oral medications given were dexamethasone and metoprolol with sips of water. PET scan completed. HR in 120s after PET scan. EKG completed. Metoprolol given. HR WNL upon reassessment. Pt's bone marrow biopsy site secreting large amount of serous drainage. NP made aware. Dressed with pressure dressing per MOHAMUD. No other needs at this time.

## 2024-06-14 NOTE — H&P
Ear Nose & Throat Interval H&P Note    Patient seen and examined this morning.  No interval changes to consult note from 6/13.  Plan is for lymph node biopsy in the OR today with ENT.

## 2024-06-15 PROBLEM — Z51.11 ADMISSION FOR CHEMOTHERAPY: Status: ACTIVE | Noted: 2024-06-15

## 2024-06-15 LAB
ABO GROUP (TYPE) IN BLOOD: NORMAL
ALBUMIN SERPL BCP-MCNC: 2.8 G/DL (ref 3.4–5)
ALBUMIN SERPL BCP-MCNC: 3 G/DL (ref 3.4–5)
ANION GAP SERPL CALC-SCNC: 11 MMOL/L (ref 10–20)
ANION GAP SERPL CALC-SCNC: 12 MMOL/L (ref 10–20)
ANTIBODY SCREEN: NORMAL
BASOPHILS # BLD AUTO: 0.08 X10*3/UL (ref 0–0.1)
BASOPHILS NFR BLD AUTO: 0.1 %
BUN SERPL-MCNC: 41 MG/DL (ref 6–23)
BUN SERPL-MCNC: 43 MG/DL (ref 6–23)
BURR CELLS BLD QL SMEAR: NORMAL
CALCIUM SERPL-MCNC: 7.2 MG/DL (ref 8.6–10.6)
CALCIUM SERPL-MCNC: 7.4 MG/DL (ref 8.6–10.6)
CHLORIDE SERPL-SCNC: 114 MMOL/L (ref 98–107)
CHLORIDE SERPL-SCNC: 114 MMOL/L (ref 98–107)
CO2 SERPL-SCNC: 17 MMOL/L (ref 21–32)
CO2 SERPL-SCNC: 19 MMOL/L (ref 21–32)
CREAT SERPL-MCNC: 1.64 MG/DL (ref 0.5–1.05)
CREAT SERPL-MCNC: 1.69 MG/DL (ref 0.5–1.05)
EGFRCR SERPLBLD CKD-EPI 2021: 32 ML/MIN/1.73M*2
EGFRCR SERPLBLD CKD-EPI 2021: 33 ML/MIN/1.73M*2
EOSINOPHIL # BLD AUTO: 0 X10*3/UL (ref 0–0.4)
EOSINOPHIL NFR BLD AUTO: 0 %
ERYTHROCYTE [DISTWIDTH] IN BLOOD BY AUTOMATED COUNT: 15.9 % (ref 11.5–14.5)
FIBRINOGEN PPP-MCNC: 124 MG/DL (ref 200–400)
GLUCOSE SERPL-MCNC: 114 MG/DL (ref 74–99)
GLUCOSE SERPL-MCNC: 125 MG/DL (ref 74–99)
HCT VFR BLD AUTO: 26.6 % (ref 36–46)
HGB BLD-MCNC: 8 G/DL (ref 12–16)
HOLD SPECIMEN: NORMAL
IMM GRANULOCYTES # BLD AUTO: 2.11 X10*3/UL (ref 0–0.5)
IMM GRANULOCYTES NFR BLD AUTO: 2.2 % (ref 0–0.9)
LDH SERPL L TO P-CCNC: 4959 U/L (ref 84–246)
LDH SERPL L TO P-CCNC: 6076 U/L (ref 84–246)
LYMPHOCYTES # BLD AUTO: 11.85 X10*3/UL (ref 0.8–3)
LYMPHOCYTES NFR BLD AUTO: 12.2 %
MAGNESIUM SERPL-MCNC: 2.08 MG/DL (ref 1.6–2.4)
MCH RBC QN AUTO: 29.1 PG (ref 26–34)
MCHC RBC AUTO-ENTMCNC: 30.1 G/DL (ref 32–36)
MCV RBC AUTO: 97 FL (ref 80–100)
MONOCYTES # BLD AUTO: 72.15 X10*3/UL (ref 0.05–0.8)
MONOCYTES NFR BLD AUTO: 74.5 %
NEUTROPHILS # BLD AUTO: 10.68 X10*3/UL (ref 1.6–5.5)
NEUTROPHILS NFR BLD AUTO: 11 %
NRBC BLD-RTO: 0.6 /100 WBCS (ref 0–0)
PHOSPHATE SERPL-MCNC: 2.6 MG/DL (ref 2.5–4.9)
PHOSPHATE SERPL-MCNC: 3.1 MG/DL (ref 2.5–4.9)
PLATELET # BLD AUTO: 151 X10*3/UL (ref 150–450)
POTASSIUM SERPL-SCNC: 4.7 MMOL/L (ref 3.5–5.3)
POTASSIUM SERPL-SCNC: 5.1 MMOL/L (ref 3.5–5.3)
RBC # BLD AUTO: 2.75 X10*6/UL (ref 4–5.2)
RBC MORPH BLD: NORMAL
RH FACTOR (ANTIGEN D): NORMAL
SODIUM SERPL-SCNC: 138 MMOL/L (ref 136–145)
SODIUM SERPL-SCNC: 139 MMOL/L (ref 136–145)
URATE SERPL-MCNC: 4.2 MG/DL (ref 2.3–6.7)
URATE SERPL-MCNC: 4.9 MG/DL (ref 2.3–6.7)
WBC # BLD AUTO: 96.9 X10*3/UL (ref 4.4–11.3)

## 2024-06-15 PROCEDURE — 86923 COMPATIBILITY TEST ELECTRIC: CPT

## 2024-06-15 PROCEDURE — 2500000004 HC RX 250 GENERAL PHARMACY W/ HCPCS (ALT 636 FOR OP/ED): Performed by: NURSE PRACTITIONER

## 2024-06-15 PROCEDURE — 99233 SBSQ HOSP IP/OBS HIGH 50: CPT | Performed by: INTERNAL MEDICINE

## 2024-06-15 PROCEDURE — 80069 RENAL FUNCTION PANEL: CPT | Mod: 91

## 2024-06-15 PROCEDURE — 80069 RENAL FUNCTION PANEL: CPT

## 2024-06-15 PROCEDURE — 84550 ASSAY OF BLOOD/URIC ACID: CPT | Mod: 91

## 2024-06-15 PROCEDURE — 85025 COMPLETE CBC W/AUTO DIFF WBC: CPT

## 2024-06-15 PROCEDURE — 83735 ASSAY OF MAGNESIUM: CPT

## 2024-06-15 PROCEDURE — 83615 LACTATE (LD) (LDH) ENZYME: CPT | Performed by: PHYSICIAN ASSISTANT

## 2024-06-15 PROCEDURE — 1170000001 HC PRIVATE ONCOLOGY ROOM DAILY

## 2024-06-15 PROCEDURE — 85384 FIBRINOGEN ACTIVITY: CPT | Performed by: PHYSICIAN ASSISTANT

## 2024-06-15 PROCEDURE — 2500000004 HC RX 250 GENERAL PHARMACY W/ HCPCS (ALT 636 FOR OP/ED)

## 2024-06-15 PROCEDURE — 83615 LACTATE (LD) (LDH) ENZYME: CPT | Mod: 91

## 2024-06-15 PROCEDURE — 2500000001 HC RX 250 WO HCPCS SELF ADMINISTERED DRUGS (ALT 637 FOR MEDICARE OP)

## 2024-06-15 PROCEDURE — 86901 BLOOD TYPING SEROLOGIC RH(D): CPT

## 2024-06-15 PROCEDURE — 84550 ASSAY OF BLOOD/URIC ACID: CPT | Performed by: PHYSICIAN ASSISTANT

## 2024-06-15 RX ORDER — DEXAMETHASONE 6 MG/1
6 TABLET ORAL EVERY 12 HOURS SCHEDULED
Status: DISCONTINUED | OUTPATIENT
Start: 2024-06-15 | End: 2024-06-17 | Stop reason: HOSPADM

## 2024-06-15 RX ORDER — DEXAMETHASONE 4 MG/1
2 TABLET ORAL ONCE
Status: COMPLETED | OUTPATIENT
Start: 2024-06-15 | End: 2024-06-15

## 2024-06-15 ASSESSMENT — COGNITIVE AND FUNCTIONAL STATUS - GENERAL
DAILY ACTIVITIY SCORE: 24
MOBILITY SCORE: 24

## 2024-06-15 ASSESSMENT — PAIN SCALES - GENERAL: PAINLEVEL_OUTOF10: 0 - NO PAIN

## 2024-06-15 ASSESSMENT — PAIN - FUNCTIONAL ASSESSMENT: PAIN_FUNCTIONAL_ASSESSMENT: 0-10

## 2024-06-15 NOTE — PROGRESS NOTES
"Jailene Lacy is a 73 y.o. female on day 4 of admission presenting with Acute leukemia (Multi).    Subjective   No acute events over night. Afebrile. Feels ok today. Slightly frustrated that she didn't get the biopsy done yesterday. Denies SOB, CP, Abd pain. Remaining ROS negative.          Objective     Physical Exam  Constitutional:       General: She is not in acute distress.  HENT:      Head: Normocephalic.      Mouth/Throat:      Mouth: Mucous membranes are moist.      Comments: Tongue lesions present   Eyes:      Extraocular Movements: Extraocular movements intact.      Pupils: Pupils are equal, round, and reactive to light.   Neck:      Comments: Several enlarged lymph nodes, L > R   Cardiovascular:      Rate and Rhythm: Normal rate and regular rhythm.      Pulses: Normal pulses.      Heart sounds: Normal heart sounds.   Pulmonary:      Effort: Pulmonary effort is normal. No respiratory distress.      Breath sounds: No wheezing or rhonchi.   Abdominal:      General: Bowel sounds are normal. There is no distension.      Palpations: Abdomen is soft.   Musculoskeletal:         General: Normal range of motion.      Cervical back: Normal range of motion.   Lymphadenopathy:      Cervical: Cervical adenopathy present.   Skin:     General: Skin is warm and dry.      Capillary Refill: Capillary refill takes less than 2 seconds.   Neurological:      Mental Status: She is alert and oriented to person, place, and time.         Last Recorded Vitals  Blood pressure 111/68, pulse 78, temperature 37.1 °C (98.8 °F), temperature source Temporal, resp. rate 18, height 1.694 m (5' 6.69\"), weight 61.4 kg (135 lb 5.8 oz), SpO2 97%.  Intake/Output last 3 Shifts:  I/O last 3 completed shifts:  In: 2227 (36.3 mL/kg) [I.V.:2227 (36.3 mL/kg)]  Out: - (0 mL/kg)   Weight: 61.4 kg     Relevant Results  Scheduled medications  acyclovir, 400 mg, oral, q12h ADDI  allopurinol, 300 mg, oral, BID  dexAMETHasone, 6 mg, oral, q12h ADDI  [Held by " provider] heparin (porcine), 5,000 Units, subcutaneous, q8h  hydroCHLOROthiazide, 12.5 mg, oral, Daily  lidocaine, 5 mL, infiltration, Once  metoprolol tartrate, 75 mg, oral, BID  pantoprazole, 40 mg, oral, Daily before breakfast  perflutren protein A microsphere, 0.5 mL, intravenous, Once in imaging  sulfur hexafluoride microsphr, 2 mL, intravenous, Once in imaging      Continuous medications  sodium chloride 0.9%, 125 mL/hr, Last Rate: 125 mL/hr (06/15/24 0939)      PRN medications  PRN medications: alteplase, alteplase, LORazepam    Results for orders placed or performed during the hospital encounter of 06/11/24 (from the past 24 hour(s))   Electrocardiogram, 12-lead PRN ACS symptoms   Result Value Ref Range    Ventricular Rate 121 BPM    Atrial Rate 121 BPM    SC Interval 146 ms    QRS Duration 84 ms    QT Interval 322 ms    QTC Calculation(Bazett) 457 ms    P Axis 47 degrees    R Axis -7 degrees    T Axis 32 degrees    QRS Count 20 beats    Q Onset 218 ms    P Onset 145 ms    P Offset 183 ms    T Offset 379 ms    QTC Fredericia 406 ms   Renal function panel   Result Value Ref Range    Glucose 137 (H) 74 - 99 mg/dL    Sodium 139 136 - 145 mmol/L    Potassium 4.9 3.5 - 5.3 mmol/L    Chloride 113 (H) 98 - 107 mmol/L    Bicarbonate 19 (L) 21 - 32 mmol/L    Anion Gap 12 10 - 20 mmol/L    Urea Nitrogen 41 (H) 6 - 23 mg/dL    Creatinine 1.52 (H) 0.50 - 1.05 mg/dL    eGFR 37 (L) >60 mL/min/1.73m*2    Calcium 7.7 (L) 8.6 - 10.6 mg/dL    Phosphorus 3.2 2.5 - 4.9 mg/dL    Albumin 3.1 (L) 3.4 - 5.0 g/dL   Lactate dehydrogenase   Result Value Ref Range    LDH 4,945 (H) 84 - 246 U/L   Uric Acid   Result Value Ref Range    Uric Acid 5.1 2.3 - 6.7 mg/dL   Type and screen   Result Value Ref Range    ABO TYPE O     Rh TYPE POS     ANTIBODY SCREEN NEG    Renal Function Panel   Result Value Ref Range    Glucose 125 (H) 74 - 99 mg/dL    Sodium 139 136 - 145 mmol/L    Potassium 5.1 3.5 - 5.3 mmol/L    Chloride 114 (H) 98 - 107  mmol/L    Bicarbonate 19 (L) 21 - 32 mmol/L    Anion Gap 11 10 - 20 mmol/L    Urea Nitrogen 43 (H) 6 - 23 mg/dL    Creatinine 1.69 (H) 0.50 - 1.05 mg/dL    eGFR 32 (L) >60 mL/min/1.73m*2    Calcium 7.4 (L) 8.6 - 10.6 mg/dL    Phosphorus 3.1 2.5 - 4.9 mg/dL    Albumin 2.8 (L) 3.4 - 5.0 g/dL   Magnesium   Result Value Ref Range    Magnesium 2.08 1.60 - 2.40 mg/dL   Uric Acid   Result Value Ref Range    Uric Acid 4.9 2.3 - 6.7 mg/dL   Lactate Dehydrogenase   Result Value Ref Range    LDH 4,959 (H) 84 - 246 U/L   Fibrinogen   Result Value Ref Range    Fibrinogen 124 (L) 200 - 400 mg/dL   CBC and Auto Differential   Result Value Ref Range    WBC 96.9 (HH) 4.4 - 11.3 x10*3/uL    nRBC 0.6 (H) 0.0 - 0.0 /100 WBCs    RBC 2.75 (L) 4.00 - 5.20 x10*6/uL    Hemoglobin 8.0 (L) 12.0 - 16.0 g/dL    Hematocrit 26.6 (L) 36.0 - 46.0 %    MCV 97 80 - 100 fL    MCH 29.1 26.0 - 34.0 pg    MCHC 30.1 (L) 32.0 - 36.0 g/dL    RDW 15.9 (H) 11.5 - 14.5 %    Platelets 151 150 - 450 x10*3/uL    Neutrophils % 11.0 40.0 - 80.0 %    Immature Granulocytes %, Automated 2.2 (H) 0.0 - 0.9 %    Lymphocytes % 12.2 13.0 - 44.0 %    Monocytes % 74.5 2.0 - 10.0 %    Eosinophils % 0.0 0.0 - 6.0 %    Basophils % 0.1 0.0 - 2.0 %    Neutrophils Absolute 10.68 (H) 1.60 - 5.50 x10*3/uL    Immature Granulocytes Absolute, Automated 2.11 (H) 0.00 - 0.50 x10*3/uL    Lymphocytes Absolute 11.85 (H) 0.80 - 3.00 x10*3/uL    Monocytes Absolute 72.15 (H) 0.05 - 0.80 x10*3/uL    Eosinophils Absolute 0.00 0.00 - 0.40 x10*3/uL    Basophils Absolute 0.08 0.00 - 0.10 x10*3/uL   Morphology   Result Value Ref Range    RBC Morphology See Below     [place] Cells Many      NM PET CT lymphoma staging    Result Date: 6/14/2024  Interpreted By:  Emile Perea and Bera Kaustav STUDY: NM PET CT LYMPHOMA STAGING;  6/14/2024 10:25 am   INDICATION: Signs/Symptoms:Lymphoma staging. DLBCL versus high-grade lymphoma. Bone marrow biopsy pending.   COMPARISON: None.   ACCESSION NUMBER(S):  [id number]   ORDERING CLINICIAN: PEPE DUGAN   TECHNIQUE: DIVISION OF NUCLEAR MEDICINE POSITRON EMISSION TOMOGRAPHY (PET-CT)   The patient received an intravenous dose of 12.9 mCi of Fluorine-18 fluorodeoxyglucose (FDG).  Positron emission tomographic (PET) images from mid thigh to vertex of the head  were then acquired after a one hour delay. Also acquired was a contemporaneous low dose non-contrast CT scan performed for attenuation correction of PET images and anatomic localization.  The PET and CT images were digitally fused for display.  All images were acquired on a combined PET-CT scanner unit.  Some areas of FDG accumulation may be described in standardized uptake value (SUV) units.   CODING: Initial Treatment Strategy (PI)   CALIBRATION: Dose Injection-to-Scan Interval (mins): 71 min Mediastinal bloodpool SUV (normal 1.5-2.5): 2.0 Blood glucose: 155 mg/dL   FINDINGS: HEAD AND NECK: No evidence of focal hypermetabolic lesion in the partially visualized brain parenchyma, noting that evaluation is limited because of the expected physiologic diffuse FDG uptake in the brain. There is diffuse hypermetabolic activity involving the region of the left parotid gland with SUV max of 8.0. There are multiple enlarged and hypermetabolic cervical lymph nodes involving level 1 and 2 with SUV max of 4.1.     CHEST: No focal hypermetabolic lesion is seen in the lung parenchyma. There are multiple hypermetabolic supraclavicular, axillary, mediastinal and hilar lymph nodes. For instance, there is a hypermetabolic right supraclavicular lymph node with SUV max of 5.6, bilateral axillary lymphadenopathy with SUV max of 2.6 on the right and 3.2 on the left, right upper paratracheal lymph node with SUV max of 2.9 right lower paratracheal lymph node with SUV max of 3.4 right hilar lymph node with SUV max of 4.1 left hilar lymph node with SUV max of 3.4, subcarinal lymph node with SUV max of 4.2, prominent right internal mammary  lymph node with SUV max of 3.7 left internal mammary lymph node with SUV max of 2.3.. Small bilateral pleural effusions with consolidative opacities seen within the left lower lobe without significantly increased hypermetabolic activity. Mildly hypermetabolic subcutaneous soft tissue nodules overlying the right anterior 5th rib.   ABDOMEN AND PELVIS: No hypermetabolic soft tissue lesion is present in the abdomen and pelvis. There are enlarged mesenteric lymph nodes with SUV max of 5.3. there are multiple enlarged pelvic sidewall lymph nodes with SUV max of 3.5 at the right external iliac lymph node station and 3.6 on the left. There are multiple enlarged and hypermetabolic inguinal lymph nodes SUV max of 3.3 on the left and 3.6 on the right. Splenomegaly with diffusely increased FDG uptake within the spleen as compared to the liver. Otherwise, physiologic radiotracer uptake is present in the liver and spleen with excretion into the bowel loops and the genitourinary tract. Left adnexal cystic lesion is visualized, without FDG avidity. Moderate free fluid is seen within the pelvis.   MUSCULOSKELETAL: No focal hypermetabolic lesion is seen in the axial or appendicular to suggest osseous metastasis. Diffusely increased FDG uptake is seen in the bone marrow, possible representing marrow hyperplasia related to the patient's history of anaemia versus lymphomatous involvement.       1. Innumerable hypermetabolic cervical, abdominopelvic lymph nodes consistent with an active lymphomatous process. 2. Splenomegaly with diffusely increased activity throughout the spleen, is compatible with splenic involvement. 3. Diffusely increased FDG uptake is seen in the bone marrow, possible representing marrow hyperplasia related to the patient's history of anaemia but is concerning for lymphomatous involvement. Consider correlation with bone marrow biopsy.     I personally reviewed the image(s) / study and agree with the findings and  interpretation as stated. This study was interpreted at [facility].   Signed by: Emile Perea 6/14/2024 12:51 PM Dictation workstation:   [id number]    Bedside PICC Imaging    Result Date: 6/12/2024  These images are not reportable by radiology and will not be interpreted by  Radiologists.    Onco-Echo Complete (Strain & 3D)    Result Date: 6/12/2024   [facility], [address] [place] [address]                Tel [phone] and Fax [phone] TRANSTHORACIC ECHOCARDIOGRAM REPORT  Patient Name:      SANDOVAL MORALES        Reading Physician:    [id number] Vargas Karimi MD Study Date:        6/12/2024            Ordering Provider:    [id number] Miguel DUGAN MRN/PID:           [id number]             Fellow:               [id number] Lisa Rodriguez MD Accession#:        [id number]         Nurse:                Mian Rubio RN Date of Birth/Age: 1950 / 73 years Sonographer:          Shantel Stallworth                                                               [facility] Gender:            F                    Additional Staff: Height:                                 Admit Date:           6/10/2024 Weight:                                 Admission Status:     Inpatient -                                                               Routine BSA / BMI:         m2 / kg/m2           Encounter#:           [id number]                                         Department Location:  [place] [address] Blood Pressure: 93 /54 mmHg Study Type:    ONCO-ECHO COMPLETE (STRAIN AND 3D) Diagnosis/ICD: Encounter for other preprocedural examination-Z01.818 Indication:    Acute leukemia CPT Code:      Echo Complete w Full Doppler-19429; 3D Rendering w/o independent                workstation-[id number]; Myocardial Strain Imaging-76515 Patient History: Pertinent History: Acute  leukemia; HTN. Study Detail: The following Echo studies were performed: 2D, M-Mode, Doppler,               color flow, Strain and 3D. Technically challenging study due to               body habitus. Definity used as a contrast agent for endocardial               border definition. Total contrast used for this procedure was 1 mL               via IV push.  PHYSICIAN INTERPRETATION: Left Ventricle: The left ventricular systolic function is hyperdynamic, with an estimated ejection fraction of 70-75%. There are no regional wall motion abnormalities. The left ventricular cavity size is normal. The left ventricular septal wall thickness is normal. There is normal left ventricular posterior wall thickness. Left Ventricular Global Longitudinal Strain - 17.2 %. Spectral Doppler shows a normal pattern of left ventricular diastolic filling. Left Atrium: The left atrium is normal in size. Right Ventricle: The right ventricle is normal in size. There is normal right ventricular global systolic function. Right Atrium: The right atrium is normal in size. Aortic Valve: The aortic valve is trileaflet. There is no evidence of aortic valve regurgitation. The peak instantaneous gradient of the aortic valve is 7.2 mmHg. Mitral Valve: The mitral valve is normal in structure. There is mild mitral annular calcification. There is trace mitral valve regurgitation. Tricuspid Valve: The tricuspid valve is structurally normal. There is trace tricuspid regurgitation. The Doppler estimated RVSP is within normal limits at 29.4 mmHg. Pulmonic Valve: The pulmonic valve is not well visualized. There is physiologic pulmonic valve regurgitation. Pericardium: There is a trivial pericardial effusion. Aorta: The aortic root is normal. The aortic root appears normal in size and measures 3.30 cm. There is no dilatation of the ascending aorta. Systemic Veins: The inferior vena cava appears to be of normal size. There is IVC inspiratory collapse greater than  50%. In comparison to the previous echocardiogram(s): There are no prior studies on this patient for comparison purposes.  ONCO-CARDIOLOGY: Vital Signs: The patients heart rate during the study was 109 beats per minute. The patients blood pressure was 93/54 during the study. Machine: This study was performed on the [employer] Epic. Comments: The LV strain is likely underestimated due to poor tracking.  Onco-Cardiology Measurements: Current Measurements 2D EF (Biplane)                   70.6% 3D EF                             70.2% Global Longitudinal Strain (GLS) -17.2% GLS Tracking Quality: Poor  CONCLUSIONS:  1. Left ventricular systolic function is hyperdynamic with a 70-75% estimated ejection fraction.  2. RVSP within normal limits.  3. Normal aortic root.  4. LV global longitudinal strain is -17.2% but likely underestimated due to poor tracking. QUANTITATIVE DATA SUMMARY: 2D MEASUREMENTS:                    Normal Ranges: Ao Root d: 3.30 cm (2.0-3.7cm) IVSd:      0.70 cm (0.6-1.1cm) LVPWd:     0.70 cm (0.6-1.1cm) LVIDd:     4.00 cm (3.9-5.9cm) LVIDs:     2.80 cm LV % FS    30.0 % LA VOLUME:                             Normal Ranges: LA Vol A4C:        32.3 ml  (22+/-6mL/m2) LA Area A4C:       13.2 cm2 LA Major Axis A4C: 4.6 cm RA VOLUME BY A/L METHOD:                       Normal Ranges: RA Area A4C: 13.8 cm2 LV SYSTOLIC FUNCTION BY 2D PLANIMETRY (MOD):                                          Normal Ranges: EF-A4C View:                      73.6 % (>=55%) EF-A2C View:                      74.9 % EF-Biplane:                       74.2 % Global Longitudinal Strain (GLS): 17.2 % LV DIASTOLIC FUNCTION:                               Normal Ranges: MV Peak E:        0.71 m/s    (0.7-1.2 m/s) MV Peak A:        0.55 m/s    (0.42-0.7 m/s) E/A Ratio:        1.29        (1.0-2.2) MV e'             0.12 m/s    (>8.0) MV lateral e'     0.11 m/s MV medial e'      0.12 m/s MV A Dur:         148.00 msec E/e' Ratio:       6.18         (<8.0) MV DT:            193 msec    (150-240 msec) PulmV Sys Praful:    56.10 cm/s PulmV Lund Praful:   43.70 cm/s PulmV S/D Praful:    1.30 PulmV A Revs Praful: 29.10 cm/s PulmV A Revs Dur: 124.00 msec MITRAL VALVE:                 Normal Ranges: MV DT: 193 msec (150-240msec) AORTIC VALVE:                         Normal Ranges: AoV Vmax:      1.34 m/s (<=1.7m/s) AoV Peak P.2 mmHg (<20mmHg) LVOT Max Praful:  1.12 m/s (<=1.1m/s) LVOT VTI:      25.50 cm LVOT Diameter: 1.90 cm  (1.8-2.4cm) AoV Area,Vmax: 2.37 cm2 (2.5-4.5cm2)  RIGHT VENTRICLE: RV Basal 3.90 cm RV Mid   3.38 cm RV Major 6.6 cm TAPSE:   22.2 mm RV s'    0.20 m/s TRICUSPID VALVE/RVSP:                             Normal Ranges: Peak TR Velocity: 2.57 m/s RV Syst Pressure: 29.4 mmHg (< 30mmHg) IVC Diam:         1.38 cm PULMONIC VALVE:                         Normal Ranges: PV Accel Time: 85 msec  (>120ms) PV Max Praful:    0.9 m/s  (0.6-0.9m/s) PV Max PG:     3.4 mmHg Pulmonary Veins: PulmV A Revs Dur: 124.00 msec PulmV A Revs Praful: 29.10 cm/s PulmV Lund Praful:   43.70 cm/s PulmV S/D Praful:    1.30 PulmV Sys Praful:    56.10 cm/s  [id number] Vargas Karimi MD Electronically signed on 2024 at 12:04:55 PM  ** Final **     US head neck soft tissue    Result Date: 6/10/2024  EXAMINATION: THYROID ULTRASOUND 6/10/2024 COMPARISON: CT neck from the same day. HISTORY: ORDERING SYSTEM PROVIDED HISTORY: abnormal imaging, concern for thyroid nodule/mass TECHNOLOGIST PROVIDED HISTORY: Reason for exam:->abnormal imaging, concern for thyroid nodule/mass What reading provider will be dictating this exam?->CRC FINDINGS: Right thyroid lobe: 4.2 x 1.3 x 1.5cm Left thyroid lobe: 5.6 x 2.4 x 2.2cm Isthmus: 0.4cm Echotexture: Diffuse thyroid heterogeneity. Vascularity: Normal vascularity. Thyroid Nodules: Bilateral thyroid nodules.  Largest and/or most concerning nodules will be described.  The largest nodule on the right measures 14 mm and has a spongiform (TR 2) appearance. NODULE: Left 1  Size: 23 x 21 x 20 mm Location: Left midpole 1. Composition:  Solid (2) 2. Echogenicity:  Hypoechoic (2) 3. Shape:  Wider-than-tall (0) 4. Margins:  Ill-defined (0) 5. Echogenic foci:  Punctate echogenic foci (3) ACR TI-RADS total points:  7 ACR TI-RADS risk category: TR 5 Prior biopsy: No NODULE: Left 2 Size: 15 x 13 x 12 mm Location: Left lower pole 1. Composition:  Solid (2) 2. Echogenicity:  Hypoechoic (2) 3. Shape:  Wider-than-tall (0) 4. Margins:  Ill-defined (0) 5. Echogenic foci:  Punctate echogenic foci (3) ACR TI-RADS total points:  7 ACR TI-RADS risk category: TR 5 Prior biopsy: No Soft tissues: There are multiple lymph nodes in the right and left neck soft tissues.  The lymph nodes have a markedly abnormal appearance with very thickened cortices.  Please reference CT neck report from the same day.    1.  Mild thyroid heterogeneity.  Normal vascularity. 2.  Bilateral thyroid nodules.  The nodules described above on the left meet criteria for FNA.  Left midpole TR 5 nodule and left lower pole TR 5 nodule. 3.  Abnormal lymphadenopathy partially evaluated on this study.  Left and right neck lymph nodes with very thickened cortices.  Please reference CT neck report from the same day. ACR TI-RADS recommendations: TR5 (>= 7 points):  FNA if >= 1 cm; follow-up if 0.5-0.9 cm in 1, 2, 3, 4, and 5 years TR4 (4-6 points):  FNA if >= 1.5 cm; follow-up if 1.0-1.4 cm in 1, 2, 3, and 5 years TR3 (3 points):  FNA if >= 2.5 cm; follow-up if 1.5-2.4 cm in 1, 3, and 5 years TR2 (2 points):  No FNA or follow-up TR1 (0 points):  No FNA or follow-up ACR TI-RADS recommends that no more than two nodules with the highest ACR TI-RADS point total should be biopsied and no more than four nodules should be followed. RECOMMENDATIONS: Recommend ultrasound-guided FNA tissue evaluation of the left midpole TR 5 nodule measuring 23 mm and seen on image 51.  Recommend FNA tissue evaluation of the left lower pole TR 5 nodule measuring 15 mm  seen on image 55. Consider FNA tissue evaluation of lymph nodes in the right and left neck soft tissues at that time.    CT chest wo IV contrast    Result Date: 6/10/2024  EXAMINATION: CT OF THE CHEST WITHOUT CONTRAST 6/10/2024 1:13 pm TECHNIQUE: CT of the chest was performed without the administration of intravenous contrast. Multiplanar reformatted images are provided for review. Automated exposure control, iterative reconstruction, and/or weight based adjustment of the mA/kV was utilized to reduce the radiation dose to as low as reasonably achievable. COMPARISON: CT of the abdomen and pelvis from earlier today. HISTORY: ORDERING SYSTEM PROVIDED HISTORY: ro mets TECHNOLOGIST PROVIDED HISTORY: Reason for exam:->ro mets Decision Support Exception - unselect if not a suspected or confirmed emergency medical condition->Emergency Medical Condition (MA) FINDINGS: Mediastinum: There is prominent mediastinal lymphadenopathy with a precarinal lymph node measuring 1.9 cm in short axis diameter.  There is lymphadenopathy in the right paratracheal, subcarinal, and periaortic regions. There is fullness of the colby bilaterally suggesting hilar adenopathy. Absence of intravenous contrast limits sensitivity for hilar adenopathy. There is thickening of the wall of the inferior thoracic esophagus, reflux esophagitis versus esophageal malignancy.  Suggest correlation with EGD. heart and pericardium are unremarkable. Lungs/pleura: There is a 3 mm subpleural nodule in the anterior-lateral right lower lobe towards the lung base, axial image 88 series 4. There is a tiny 2 mm noncalcified subpleural nodule in the anterior-lateral right middle lobe, axial image 60 series 4. These are nonspecific and are most likely scarring from previous inflammatory disease, although malignancy cannot be entirely excluded. There is mild patchy infiltrate in the posterior and lateral left lung base, atelectasis versus pneumonia versus scarring. There is  mild patchy subpleural scarring in the apices from previous inflammatory disease. Upper Abdomen: Please see the preceding CT of the abdomen and pelvis report regarding significant findings in the upper abdomen. Soft Tissues/Bones: There is moderate bilateral axillary lymphadenopathy, the largest right-sided lymph node having a short axis diameter of 2.1 cm and the largest lymph node on the left having short axis diameter of 2.6 cm. There is a cystic structure in the lower pole of the left lobe of the thyroid measuring 2.0 cm in diameter with additional smaller cystic regions in the upper pole.  These could be further evaluated with ultrasound on a nonemergent basis. There is moderate scoliosis of the thoracic spine convex towards the right.    1. Prominent mediastinal and bilateral axillary lymphadenopathy. This is concerning for lymphoma or metastatic disease. 2. Thickening of the wall of the inferior thoracic esophagus, reflux esophagitis versus esophageal malignancy. Suggest correlation with EGD. 3. Mild patchy infiltrate in the posterior and lateral left lung base, atelectasis versus pneumonia versus scarring. 4. Tiny noncalcified subpleural nodules in the right middle and right lower lobes, most likely scarring from previous inflammatory disease, although malignancy cannot be entirely excluded. 5. Cystic structures in the left lobe of the thyroid. These could be further evaluated with ultrasound on a nonemergent basis. RECOMMENDATIONS: Suggest ultrasound of the thyroid on a nonemergent basis. Suggest EGD for evaluation of thickened wall of the inferior thoracic esophagus.    CT abdomen pelvis wo IV contrast    Result Date: 6/10/2024  EXAMINATION: CT OF THE ABDOMEN AND PELVIS WITHOUT CONTRAST6/10/2024 1:13 pm TECHNIQUE: CT of the abdomen and pelvis was performed without the administration of intravenous contrast. Multiplanar reformatted images are provided for review. Automated exposure control, iterative  reconstruction, and/or weight based adjustment of the mA/kV was utilized to reduce the radiation dose to as low as reasonably achievable. COMPARISON: 09/22/2021. HISTORY: ORDERING SYSTEM PROVIDED HISTORY: ro mets, lymphadenopathy, anemia TECHNOLOGIST PROVIDED HISTORY: Reason for exam:->ro mets, lymphadenopathy, anemia Additional Contrast?->None Decision Support Exception - unselect if not a suspected or confirmed emergency medical condition->Emergency Medical Condition (MA) FINDINGS: Evaluation of the lung bases is deferred to the dedicated CT of the chest performed the same day.  Small left pleural effusion and left basilar airspace disease are present. Multiple hepatic cysts are again identified and are not significantly changed.  There is no gross evidence of new solid hepatic lesion although evaluation of the solid organs is limited secondary to lack of intravenous contrast.  There is new splenomegaly with the spleen measuring 13.8 x 10.0 by  16.5 cm.  The pancreas, kidneys, abdominal aorta, uterus and right adnexa have an unremarkable appearance.  There is a 4.1 x 3.1 cm cystic lesion in the left adnexa which measures 15 Hounsfield units and likely represents a simple cyst.  This is not significantly changed since the prior examination. There is a small to moderate amount of free fluid within the pelvis.  There is no evidence of large or small bowel obstruction or inflammation.  There is no evidence of a dilated vermiform appendix. There are numerous enlarged gastrohepatic ligament, gastroesophageal, carlos hepatic, portacaval, peripancreatic, left periaortic, anterior periaortic, interaortocaval retroperitoneal, bilateral common iliac, mesentery, bilateral external iliac, , bilateral common femoral and left internal iliac lymph nodes.  There is a small fat containing left femoral hernia which contains a borderline enlarged lymph node.  Enlarged paracardiac lymph nodes are also identified. Bone windows  reveal no evidence of acute fracture.  There is levoconvex scoliosis of the lumbar spine with moderate degenerative changes, particularly at L3-4 and L4-5.  No osteolytic or osteoblastic lesion is seen.    Splenomegaly with extensive abdominal and pelvic lymph node enlargement are consistent with lymphoma/leukemia or other metastatic disease. Small to moderate amount of free fluid in the pelvis. Stable 4.1 cm left adnexal cystic lesion.    CT soft tissue neck wo IV contrast    Result Date: 6/10/2024  EXAMINATION: CT OF THE NECK WITHOUT CONTRAST  6/10/2024 TECHNIQUE: CT of the neck was performed without the administration of intravenous contrast. Multiplanar reformatted images are provided for review. Automated exposure control, iterative reconstruction, and/or weight based adjustment of the mA/kV was utilized to reduce the radiation dose to as low as reasonably achievable. COMPARISON: None HISTORY: ORDERING SYSTEM PROVIDED HISTORY: lymphadenopathy, right submandibular and left preauricular TECHNOLOGIST PROVIDED HISTORY: Reason for exam:->lymphadenopathy, right submandibular and left preauricular Decision Support Exception - unselect if not a suspected or confirmed emergency medical condition->Emergency Medical Condition (MA) FINDINGS: PHARYNX/LARYNX:  The tonsillar pillars are normal in appearance.  The tongue is normal in appearance.  The valleculae, epiglottis, aryepiglottic folds and pyriform sinuses appear unremarkable.  The true and false vocal cords are normal in appearance.  No mass or abscess is seen. SALIVARY GLANDS/THYROID:  The parotid and submandibular glands appear unremarkable. There is a hypodense nodule within the left lobe of the thyroid gland measuring 2.2 cm.  Thyroid ultrasound is suggested for further evaluation. LYMPH NODES:  Moderate diffuse bilateral cervical lymphadenopathy is identified involving the jugular chains as well as the submandibular region bilaterally.  The most prominent lymph  node is located within the right supraclavicular region measuring 2 cm and within the left level 2 jugular chain measuring 2.3 cm.  The largest right submandibular lymph node measures 1.8 cm. SOFT TISSUES:  No appreciable soft tissue swelling or mass is seen. BRAIN/ORBITS/SINUSES:  The visualized portion of the intracranial contents appear unremarkable.  The visualized portion of the orbits, paranasal sinuses and mastoid air cells demonstrate no acute abnormality. LUNG APICES/SUPERIOR MEDIASTINUM:  No focal consolidation is seen within the visualized lung apices.  No superior mediastinal lymphadenopathy or mass. The visualized portion of the trachea appears unremarkable. BONES:  No aggressive appearing lytic or blastic bony lesion.    1. Moderate diffuse bilateral cervical lymphadenopathy.  These changes may be related to a lymphoproliferative disorder such as lymphoma.  Biopsy may be considered. 2. 2.2 cm left thyroid nodule.  Thyroid ultrasound is suggested for further evaluation.       Assessment/Plan   Principal Problem:    Acute leukemia (Multi)  Active Problems:    Tumor lysis syndrome ([facility])    Admission for chemotherapy    Lymphadenopathy      Jailene Lacy is a 73 y.o. female with PMHx of HTN, HLD, presents to OSH with leukocytosis of 60k and diffuse lymphadenopathy of the face/neck who is being transferred to [facility] for further evaluation and treatment.      ONC:  # ?DLBCL vs high grade lymphoma   Presented with leukocytosis of 60k, now 96.9 (6/15) related to disease   -flow cytometry favoring lymphoid malignancy      - initiate dexamethasone 4mg q12h, increased to 6mg q12h 6/15  -Onco-Echo EF 70-75%  -BMBx, pending  - Consult ENT for lymph node biopsy, planned for 6/14 but unable to be completed due to OR space, planned again for Monday 6/17  - PET/CT: 1. Innumerable hypermetabolic cervical, abdominopelvic lymph nodes  consistent with an active lymphomatous process.  2. Splenomegaly with diffusely  increased activity throughout the spleen, is compatible with splenic involvement.  3. Diffusely increased FDG uptake is seen in the bone marrow, possible representing marrow hyperplasia related to the patient's history of anaemia but is concerning for lymphomatous involvement. Consider correlation with bone marrow biopsy.     HEME:  #Anemia, chronic  -hold home ferrous sulfate   - Transfuse for Hgb < 7 Plt < 10     Ppx: heparin 5000u q8h, on hold for OR      ID:  [id number]  -no evidence of active infection on admit  -covid swab on admit  -plan Zosyn/panculture/CXR if febrile     Ppx: acyclovir      FEN/GI:  Admit weight: 56.6 kg, current wt: 60.3 kg ()   Low path diet  #YRN, baseline sCr ~1.4-1.8, sCr today: 1.69  -mIVF NS at 100mL/hr  #Hyperuricemia, 2/2 TLS  Uric acid down-trendin.3 --> 8.9-->7.2-->5.7-->4.9  -allopurinol 300mg BID  #Hyperkalemia, 2/2 TLS  -mIVF  #Tumor lysis syndrome  - monitor counts daily, fibrinogen, coags  - LDH increasing, while sCr and uric acid remain relatively stable, will increase Dex to 6mg q12h and continue to monitor   - no evidence of DIC or hypercoag state  - TLS labs q12h    Ppx: pantoprazole     CARDS:  #Hypertension  -hold lisinopril 10 mg in setting of YRN  -cont home metoprolol 75 mg BID  -cont home hctz 12.5 mg daily     PSYCH:  #Anxiety  -Ativan 0.5mg q8h PRN     DISPO:  Full Code  DL SOLO PICC  Blood consent on admit  Needs primary oncologist       Pt seen and discussed with Dr Valerie Sanchez, APRN-CNP

## 2024-06-15 NOTE — CARE PLAN
Problem: Pain  Goal: My pain/discomfort is manageable  Outcome: Progressing     Problem: Safety  Goal: Patient will be injury free during hospitalization  Outcome: Progressing  Goal: I will remain free of falls  Outcome: Progressing     Problem: Daily Care  Goal: Daily care needs are met  Outcome: Progressing     Problem: Psychosocial Needs  Goal: Demonstrates ability to cope with hospitalization/illness  Outcome: Progressing  Goal: Collaborate with me, my family, and caregiver to identify my specific goals  Outcome: Progressing     Problem: Discharge Barriers  Goal: My discharge needs are met  Outcome: Progressing     Problem: Infection related to problem list condition  Goal: Infection will resolve through treatment  Outcome: Progressing     Problem: Skin  Goal: Decreased wound size/increased tissue granulation at next dressing change  Outcome: Progressing  Goal: Participates in plan/prevention/treatment measures  Outcome: Progressing  Goal: Prevent/manage excess moisture  Outcome: Progressing  Goal: Prevent/minimize sheer/friction injuries  Outcome: Progressing  Goal: Promote/optimize nutrition  Outcome: Progressing  Goal: Promote skin healing  Outcome: Progressing     Problem: Fall/Injury  Goal: Not fall by end of shift  Outcome: Progressing  Goal: Be free from injury by end of the shift  Outcome: Progressing  Goal: Verbalize understanding of personal risk factors for fall in the hospital  Outcome: Progressing  Goal: Verbalize understanding of risk factor reduction measures to prevent injury from fall in the home  Outcome: Progressing  Goal: Use assistive devices by end of the shift  Outcome: Progressing  Goal: Pace activities to prevent fatigue by end of the shift  Outcome: Progressing       The clinical goals for the shift include Patient will remain safe throughout shift

## 2024-06-15 NOTE — CARE PLAN
Problem: Pain  Goal: My pain/discomfort is manageable  Outcome: Progressing     Problem: Safety  Goal: Patient will be injury free during hospitalization  Outcome: Progressing  Goal: I will remain free of falls  Outcome: Progressing     Problem: Daily Care  Goal: Daily care needs are met  Outcome: Progressing     Problem: Psychosocial Needs  Goal: Demonstrates ability to cope with hospitalization/illness  Outcome: Progressing  Goal: Collaborate with me, my family, and caregiver to identify my specific goals  Outcome: Progressing   The patient's goals for the shift include      The clinical goals for the shift include pt will remain safe throughout shift    Pt continues to ambulate independantly

## 2024-06-16 ENCOUNTER — APPOINTMENT (OUTPATIENT)
Dept: RADIOLOGY | Facility: HOSPITAL | Age: 74
DRG: 840 | End: 2024-06-16
Payer: MEDICARE

## 2024-06-16 VITALS
WEIGHT: 141.76 LBS | OXYGEN SATURATION: 72 % | TEMPERATURE: 99 F | HEIGHT: 67 IN | HEART RATE: 111 BPM | SYSTOLIC BLOOD PRESSURE: 92 MMHG | BODY MASS INDEX: 22.25 KG/M2 | RESPIRATION RATE: 25 BRPM | DIASTOLIC BLOOD PRESSURE: 69 MMHG

## 2024-06-16 PROBLEM — C85.10 HIGH GRADE B-CELL LYMPHOMA (MULTI): Status: ACTIVE | Noted: 2024-06-11

## 2024-06-16 LAB
ALBUMIN SERPL BCP-MCNC: 2.6 G/DL (ref 3.4–5)
ALBUMIN SERPL BCP-MCNC: 2.9 G/DL (ref 3.4–5)
ANION GAP BLDA CALCULATED.4IONS-SCNC: 19 MMO/L (ref 10–25)
ANION GAP BLDA CALCULATED.4IONS-SCNC: 21 MMO/L (ref 10–25)
ANION GAP BLDA CALCULATED.4IONS-SCNC: 30 MMO/L (ref 10–25)
ANION GAP SERPL CALC-SCNC: 11 MMOL/L (ref 10–20)
ANION GAP SERPL CALC-SCNC: 14 MMOL/L (ref 10–20)
BASE EXCESS BLDA CALC-SCNC: -13.4 MMOL/L (ref -2–3)
BASE EXCESS BLDA CALC-SCNC: -25.1 MMOL/L (ref -2–3)
BASE EXCESS BLDA CALC-SCNC: -25.7 MMOL/L (ref -2–3)
BASOPHILS # BLD AUTO: ABNORMAL 10*3/UL
BASOPHILS # BLD MANUAL: 0 X10*3/UL (ref 0–0.1)
BASOPHILS NFR BLD AUTO: ABNORMAL %
BASOPHILS NFR BLD MANUAL: 0 %
BLOOD EXPIRATION DATE: NORMAL
BLOOD EXPIRATION DATE: NORMAL
BODY TEMPERATURE: 37 DEGREES CELSIUS
BUN SERPL-MCNC: 42 MG/DL (ref 6–23)
BUN SERPL-MCNC: 44 MG/DL (ref 6–23)
BURR CELLS BLD QL SMEAR: ABNORMAL
CA-I BLDA-SCNC: 1.11 MMOL/L (ref 1.1–1.33)
CA-I BLDA-SCNC: 1.15 MMOL/L (ref 1.1–1.33)
CA-I BLDA-SCNC: 1.67 MMOL/L (ref 1.1–1.33)
CALCIUM SERPL-MCNC: 7 MG/DL (ref 8.6–10.6)
CALCIUM SERPL-MCNC: 7.1 MG/DL (ref 8.6–10.6)
CHLORIDE BLDA-SCNC: 112 MMOL/L (ref 98–107)
CHLORIDE BLDA-SCNC: 116 MMOL/L (ref 98–107)
CHLORIDE BLDA-SCNC: 118 MMOL/L (ref 98–107)
CHLORIDE SERPL-SCNC: 116 MMOL/L (ref 98–107)
CHLORIDE SERPL-SCNC: 116 MMOL/L (ref 98–107)
CO2 SERPL-SCNC: 14 MMOL/L (ref 21–32)
CO2 SERPL-SCNC: 18 MMOL/L (ref 21–32)
CREAT SERPL-MCNC: 1.69 MG/DL (ref 0.5–1.05)
CREAT SERPL-MCNC: 1.82 MG/DL (ref 0.5–1.05)
DISPENSE STATUS: NORMAL
DISPENSE STATUS: NORMAL
EGFRCR SERPLBLD CKD-EPI 2021: 29 ML/MIN/1.73M*2
EGFRCR SERPLBLD CKD-EPI 2021: 32 ML/MIN/1.73M*2
EOSINOPHIL # BLD AUTO: ABNORMAL 10*3/UL
EOSINOPHIL # BLD MANUAL: 0 X10*3/UL (ref 0–0.4)
EOSINOPHIL NFR BLD AUTO: ABNORMAL %
EOSINOPHIL NFR BLD MANUAL: 0 %
ERYTHROCYTE [DISTWIDTH] IN BLOOD BY AUTOMATED COUNT: 16.1 % (ref 11.5–14.5)
ERYTHROCYTE [DISTWIDTH] IN BLOOD BY AUTOMATED COUNT: 16.1 % (ref 11.5–14.5)
FIBRINOGEN PPP-MCNC: 164 MG/DL (ref 200–400)
FIBRINOGEN PPP-MCNC: 80 MG/DL (ref 200–400)
GLUCOSE BLD MANUAL STRIP-MCNC: 193 MG/DL (ref 74–99)
GLUCOSE BLDA-MCNC: 217 MG/DL (ref 74–99)
GLUCOSE BLDA-MCNC: 266 MG/DL (ref 74–99)
GLUCOSE BLDA-MCNC: 410 MG/DL (ref 74–99)
GLUCOSE SERPL-MCNC: 119 MG/DL (ref 74–99)
GLUCOSE SERPL-MCNC: 122 MG/DL (ref 74–99)
HCO3 BLDA-SCNC: 14.6 MMOL/L (ref 22–26)
HCO3 BLDA-SCNC: 4 MMOL/L (ref 22–26)
HCO3 BLDA-SCNC: 6.7 MMOL/L (ref 22–26)
HCT VFR BLD AUTO: 24.4 % (ref 36–46)
HCT VFR BLD AUTO: 25.2 % (ref 36–46)
HCT VFR BLD EST: 19 % (ref 36–46)
HCT VFR BLD EST: 19 % (ref 36–46)
HCT VFR BLD EST: 27 % (ref 36–46)
HGB BLD-MCNC: 6.9 G/DL (ref 12–16)
HGB BLD-MCNC: 7.4 G/DL (ref 12–16)
HGB BLDA-MCNC: 6.3 G/DL (ref 12–16)
HGB BLDA-MCNC: 6.4 G/DL (ref 12–16)
HGB BLDA-MCNC: 8.9 G/DL (ref 12–16)
IMM GRANULOCYTES # BLD AUTO: 1.5 X10*3/UL (ref 0–0.5)
IMM GRANULOCYTES # BLD AUTO: 3.38 X10*3/UL (ref 0–0.5)
IMM GRANULOCYTES NFR BLD AUTO: 3.4 % (ref 0–0.9)
IMM GRANULOCYTES NFR BLD AUTO: 6.2 % (ref 0–0.9)
INHALED O2 CONCENTRATION: 100 %
INHALED O2 CONCENTRATION: 45 %
INR PPP: 1.7 (ref 0.9–1.1)
LACTATE BLDA-SCNC: 12.7 MMOL/L (ref 0.4–2)
LACTATE BLDA-SCNC: 8 MMOL/L (ref 0.4–2)
LACTATE BLDA-SCNC: >17 MMOL/L (ref 0.4–2)
LACTATE SERPL-SCNC: 22.3 MMOL/L (ref 0.4–2)
LDH SERPL L TO P-CCNC: 5852 U/L (ref 84–246)
LDH SERPL L TO P-CCNC: ABNORMAL U/L (ref 84–246)
LYMPHOCYTES # BLD AUTO: ABNORMAL 10*3/UL
LYMPHOCYTES # BLD MANUAL: 11.57 X10*3/UL (ref 0.8–3)
LYMPHOCYTES NFR BLD AUTO: ABNORMAL %
LYMPHOCYTES NFR BLD MANUAL: 11.7 %
MAGNESIUM SERPL-MCNC: 2.06 MG/DL (ref 1.6–2.4)
MCH RBC QN AUTO: 28.6 PG (ref 26–34)
MCH RBC QN AUTO: 28.8 PG (ref 26–34)
MCHC RBC AUTO-ENTMCNC: 28.3 G/DL (ref 32–36)
MCHC RBC AUTO-ENTMCNC: 29.4 G/DL (ref 32–36)
MCV RBC AUTO: 102 FL (ref 80–100)
MCV RBC AUTO: 97 FL (ref 80–100)
METAMYELOCYTES # BLD MANUAL: 1.68 X10*3/UL
METAMYELOCYTES NFR BLD MANUAL: 1.7 %
MONOCYTES # BLD AUTO: ABNORMAL 10*3/UL
MONOCYTES # BLD MANUAL: 2.47 X10*3/UL (ref 0.05–0.8)
MONOCYTES NFR BLD AUTO: ABNORMAL %
MONOCYTES NFR BLD MANUAL: 2.5 %
NEUTROPHILS # BLD AUTO: ABNORMAL 10*3/UL
NEUTROPHILS # BLD MANUAL: 29.67 X10*3/UL (ref 1.6–5.5)
NEUTROPHILS NFR BLD AUTO: ABNORMAL %
NEUTS BAND # BLD MANUAL: 0.79 X10*3/UL (ref 0–0.5)
NEUTS BAND NFR BLD MANUAL: 0.8 %
NEUTS SEG # BLD MANUAL: 28.88 X10*3/UL (ref 1.6–5)
NEUTS SEG NFR BLD MANUAL: 29.2 %
NRBC BLD-RTO: 0.7 /100 WBCS (ref 0–0)
NRBC BLD-RTO: 4.5 /100 WBCS (ref 0–0)
OXYHGB MFR BLDA: 93.5 % (ref 94–98)
OXYHGB MFR BLDA: 97.2 % (ref 94–98)
OXYHGB MFR BLDA: 97.9 % (ref 94–98)
PCO2 BLDA: 17 MM HG (ref 38–42)
PCO2 BLDA: 39 MM HG (ref 38–42)
PCO2 BLDA: 44 MM HG (ref 38–42)
PH BLDA: 6.84 PH (ref 7.38–7.42)
PH BLDA: 6.98 PH (ref 7.38–7.42)
PH BLDA: 7.13 PH (ref 7.38–7.42)
PHOSPHATE SERPL-MCNC: 2.8 MG/DL (ref 2.5–4.9)
PHOSPHATE SERPL-MCNC: 4.8 MG/DL (ref 2.5–4.9)
PLATELET # BLD AUTO: 128 X10*3/UL (ref 150–450)
PLATELET # BLD AUTO: 36 X10*3/UL (ref 150–450)
PO2 BLDA: 102 MM HG (ref 85–95)
PO2 BLDA: 257 MM HG (ref 85–95)
PO2 BLDA: 266 MM HG (ref 85–95)
POLYCHROMASIA BLD QL SMEAR: ABNORMAL
POTASSIUM BLDA-SCNC: 6 MMOL/L (ref 3.5–5.3)
POTASSIUM BLDA-SCNC: 6.1 MMOL/L (ref 3.5–5.3)
POTASSIUM BLDA-SCNC: 6.8 MMOL/L (ref 3.5–5.3)
POTASSIUM SERPL-SCNC: 5 MMOL/L (ref 3.5–5.3)
POTASSIUM SERPL-SCNC: 5.5 MMOL/L (ref 3.5–5.3)
PRODUCT BLOOD TYPE: 5100
PRODUCT BLOOD TYPE: 5100
PRODUCT CODE: NORMAL
PRODUCT CODE: NORMAL
PROTHROMBIN TIME: 19.4 SECONDS (ref 9.8–12.8)
RBC # BLD AUTO: 2.4 X10*6/UL (ref 4–5.2)
RBC # BLD AUTO: 2.59 X10*6/UL (ref 4–5.2)
RBC MORPH BLD: ABNORMAL
SAO2 % BLDA: 100 % (ref 94–100)
SAO2 % BLDA: 97 % (ref 94–100)
SAO2 % BLDA: 99 % (ref 94–100)
SCHISTOCYTES BLD QL SMEAR: ABNORMAL
SODIUM BLDA-SCNC: 134 MMOL/L (ref 136–145)
SODIUM BLDA-SCNC: 143 MMOL/L (ref 136–145)
SODIUM BLDA-SCNC: 145 MMOL/L (ref 136–145)
SODIUM SERPL-SCNC: 138 MMOL/L (ref 136–145)
SODIUM SERPL-SCNC: 140 MMOL/L (ref 136–145)
TOTAL CELLS COUNTED BLD: 120
UNIT ABO: NORMAL
UNIT ABO: NORMAL
UNIT NUMBER: NORMAL
UNIT NUMBER: NORMAL
UNIT RH: NORMAL
UNIT RH: NORMAL
UNIT VOLUME: 340
UNIT VOLUME: 83
URATE SERPL-MCNC: 3.8 MG/DL (ref 2.3–6.7)
URATE SERPL-MCNC: 4.2 MG/DL (ref 2.3–6.7)
VARIANT LYMPHS # BLD MANUAL: 53.5 X10*3/UL (ref 0–0.3)
VARIANT LYMPHS NFR BLD: 54.1 %
WBC # BLD AUTO: 24.2 X10*3/UL (ref 4.4–11.3)
WBC # BLD AUTO: 98.9 X10*3/UL (ref 4.4–11.3)
XM INTEP: NORMAL

## 2024-06-16 PROCEDURE — 84132 ASSAY OF SERUM POTASSIUM: CPT | Mod: 91

## 2024-06-16 PROCEDURE — 3E04305 INTRODUCTION OF OTHER ANTINEOPLASTIC INTO CENTRAL VEIN, PERCUTANEOUS APPROACH: ICD-10-PCS | Performed by: STUDENT IN AN ORGANIZED HEALTH CARE EDUCATION/TRAINING PROGRAM

## 2024-06-16 PROCEDURE — 2500000002 HC RX 250 W HCPCS SELF ADMINISTERED DRUGS (ALT 637 FOR MEDICARE OP, ALT 636 FOR OP/ED)

## 2024-06-16 PROCEDURE — 3E043XZ INTRODUCTION OF VASOPRESSOR INTO CENTRAL VEIN, PERCUTANEOUS APPROACH: ICD-10-PCS | Performed by: STUDENT IN AN ORGANIZED HEALTH CARE EDUCATION/TRAINING PROGRAM

## 2024-06-16 PROCEDURE — 2500000004 HC RX 250 GENERAL PHARMACY W/ HCPCS (ALT 636 FOR OP/ED)

## 2024-06-16 PROCEDURE — 85384 FIBRINOGEN ACTIVITY: CPT | Mod: 91 | Performed by: NURSE PRACTITIONER

## 2024-06-16 PROCEDURE — 86901 BLOOD TYPING SEROLOGIC RH(D): CPT | Performed by: NURSE PRACTITIONER

## 2024-06-16 PROCEDURE — 2500000001 HC RX 250 WO HCPCS SELF ADMINISTERED DRUGS (ALT 637 FOR MEDICARE OP)

## 2024-06-16 PROCEDURE — 84132 ASSAY OF SERUM POTASSIUM: CPT

## 2024-06-16 PROCEDURE — 5A12012 PERFORMANCE OF CARDIAC OUTPUT, SINGLE, MANUAL: ICD-10-PCS | Performed by: STUDENT IN AN ORGANIZED HEALTH CARE EDUCATION/TRAINING PROGRAM

## 2024-06-16 PROCEDURE — 83735 ASSAY OF MAGNESIUM: CPT | Mod: 91

## 2024-06-16 PROCEDURE — 36620 INSERTION CATHETER ARTERY: CPT | Mod: GC | Performed by: INTERNAL MEDICINE

## 2024-06-16 PROCEDURE — 83615 LACTATE (LD) (LDH) ENZYME: CPT | Mod: 91

## 2024-06-16 PROCEDURE — 2500000001 HC RX 250 WO HCPCS SELF ADMINISTERED DRUGS (ALT 637 FOR MEDICARE OP): Performed by: INTERNAL MEDICINE

## 2024-06-16 PROCEDURE — 94002 VENT MGMT INPAT INIT DAY: CPT

## 2024-06-16 PROCEDURE — 94644 CONT INHLJ TX 1ST HOUR: CPT

## 2024-06-16 PROCEDURE — 84132 ASSAY OF SERUM POTASSIUM: CPT | Performed by: NURSE PRACTITIONER

## 2024-06-16 PROCEDURE — 84550 ASSAY OF BLOOD/URIC ACID: CPT | Performed by: PHYSICIAN ASSISTANT

## 2024-06-16 PROCEDURE — 31500 INSERT EMERGENCY AIRWAY: CPT | Mod: GC

## 2024-06-16 PROCEDURE — 31500 INSERT EMERGENCY AIRWAY: CPT

## 2024-06-16 PROCEDURE — 99291 CRITICAL CARE FIRST HOUR: CPT

## 2024-06-16 PROCEDURE — 83615 LACTATE (LD) (LDH) ENZYME: CPT | Performed by: PHYSICIAN ASSISTANT

## 2024-06-16 PROCEDURE — 71045 X-RAY EXAM CHEST 1 VIEW: CPT | Performed by: RADIOLOGY

## 2024-06-16 PROCEDURE — 36430 TRANSFUSION BLD/BLD COMPNT: CPT

## 2024-06-16 PROCEDURE — 2500000004 HC RX 250 GENERAL PHARMACY W/ HCPCS (ALT 636 FOR OP/ED): Mod: JZ | Performed by: INTERNAL MEDICINE

## 2024-06-16 PROCEDURE — P9012 CRYOPRECIPITATE EACH UNIT: HCPCS

## 2024-06-16 PROCEDURE — 2500000005 HC RX 250 GENERAL PHARMACY W/O HCPCS

## 2024-06-16 PROCEDURE — 80069 RENAL FUNCTION PANEL: CPT | Mod: 91

## 2024-06-16 PROCEDURE — 85610 PROTHROMBIN TIME: CPT | Performed by: NURSE PRACTITIONER

## 2024-06-16 PROCEDURE — 85007 BL SMEAR W/DIFF WBC COUNT: CPT

## 2024-06-16 PROCEDURE — 2500000004 HC RX 250 GENERAL PHARMACY W/ HCPCS (ALT 636 FOR OP/ED): Performed by: NURSE PRACTITIONER

## 2024-06-16 PROCEDURE — 83735 ASSAY OF MAGNESIUM: CPT

## 2024-06-16 PROCEDURE — 94760 N-INVAS EAR/PLS OXIMETRY 1: CPT

## 2024-06-16 PROCEDURE — 99233 SBSQ HOSP IP/OBS HIGH 50: CPT | Performed by: INTERNAL MEDICINE

## 2024-06-16 PROCEDURE — 37799 UNLISTED PX VASCULAR SURGERY: CPT

## 2024-06-16 PROCEDURE — 94645 CONT INHLJ TX EACH ADDL HOUR: CPT

## 2024-06-16 PROCEDURE — 85384 FIBRINOGEN ACTIVITY: CPT | Performed by: PHYSICIAN ASSISTANT

## 2024-06-16 PROCEDURE — 0BH17EZ INSERTION OF ENDOTRACHEAL AIRWAY INTO TRACHEA, VIA NATURAL OR ARTIFICIAL OPENING: ICD-10-PCS | Performed by: ANESTHESIOLOGY

## 2024-06-16 PROCEDURE — 85027 COMPLETE CBC AUTOMATED: CPT

## 2024-06-16 PROCEDURE — 71045 X-RAY EXAM CHEST 1 VIEW: CPT

## 2024-06-16 PROCEDURE — 82947 ASSAY GLUCOSE BLOOD QUANT: CPT

## 2024-06-16 PROCEDURE — 83605 ASSAY OF LACTIC ACID: CPT | Mod: 91,MUE

## 2024-06-16 PROCEDURE — 2020000001 HC ICU ROOM DAILY

## 2024-06-16 PROCEDURE — 2500000002 HC RX 250 W HCPCS SELF ADMINISTERED DRUGS (ALT 637 FOR MEDICARE OP, ALT 636 FOR OP/ED): Performed by: INTERNAL MEDICINE

## 2024-06-16 PROCEDURE — 85610 PROTHROMBIN TIME: CPT | Mod: 91

## 2024-06-16 PROCEDURE — 5A1935Z RESPIRATORY VENTILATION, LESS THAN 24 CONSECUTIVE HOURS: ICD-10-PCS | Performed by: STUDENT IN AN ORGANIZED HEALTH CARE EDUCATION/TRAINING PROGRAM

## 2024-06-16 PROCEDURE — 84075 ASSAY ALKALINE PHOSPHATASE: CPT

## 2024-06-16 PROCEDURE — 84550 ASSAY OF BLOOD/URIC ACID: CPT | Mod: 91,MUE

## 2024-06-16 RX ORDER — DIPHENHYDRAMINE HYDROCHLORIDE 50 MG/ML
50 INJECTION INTRAMUSCULAR; INTRAVENOUS AS NEEDED
Status: COMPLETED | OUTPATIENT
Start: 2024-06-16 | End: 2024-06-16

## 2024-06-16 RX ORDER — DIPHENHYDRAMINE HYDROCHLORIDE 50 MG/ML
50 INJECTION INTRAMUSCULAR; INTRAVENOUS AS NEEDED
Status: CANCELLED | OUTPATIENT
Start: 2024-06-16

## 2024-06-16 RX ORDER — INDOMETHACIN 25 MG/1
CAPSULE ORAL
Status: DISCONTINUED
Start: 2024-06-16 | End: 2024-06-17 | Stop reason: HOSPADM

## 2024-06-16 RX ORDER — DIPHENHYDRAMINE HCL 50 MG
50 CAPSULE ORAL ONCE
Status: CANCELLED | OUTPATIENT
Start: 2024-06-16

## 2024-06-16 RX ORDER — EPINEPHRINE 1 MG/ML
0.3 INJECTION INTRAMUSCULAR; INTRAVENOUS; SUBCUTANEOUS EVERY 5 MIN PRN
Status: CANCELLED | OUTPATIENT
Start: 2024-06-16

## 2024-06-16 RX ORDER — ALBUTEROL SULFATE 0.83 MG/ML
3 SOLUTION RESPIRATORY (INHALATION) AS NEEDED
Status: CANCELLED | OUTPATIENT
Start: 2024-06-16

## 2024-06-16 RX ORDER — DIPHENHYDRAMINE HCL 50 MG
50 CAPSULE ORAL ONCE
Status: COMPLETED | OUTPATIENT
Start: 2024-06-16 | End: 2024-06-16

## 2024-06-16 RX ORDER — FAMOTIDINE 10 MG/ML
20 INJECTION INTRAVENOUS AS NEEDED
Status: CANCELLED | OUTPATIENT
Start: 2024-06-16

## 2024-06-16 RX ORDER — ACETAMINOPHEN 325 MG/1
650 TABLET ORAL ONCE
Status: COMPLETED | OUTPATIENT
Start: 2024-06-16 | End: 2024-06-16

## 2024-06-16 RX ORDER — PROCHLORPERAZINE EDISYLATE 5 MG/ML
10 INJECTION INTRAMUSCULAR; INTRAVENOUS EVERY 6 HOURS PRN
Status: CANCELLED | OUTPATIENT
Start: 2024-06-16

## 2024-06-16 RX ORDER — FAMOTIDINE 10 MG/ML
20 INJECTION INTRAVENOUS ONCE
Status: COMPLETED | OUTPATIENT
Start: 2024-06-16 | End: 2024-06-16

## 2024-06-16 RX ORDER — ACETAMINOPHEN 325 MG/1
650 TABLET ORAL ONCE
Status: CANCELLED | OUTPATIENT
Start: 2024-06-16

## 2024-06-16 RX ORDER — CALCIUM GLUCONATE 20 MG/ML
2 INJECTION, SOLUTION INTRAVENOUS ONCE
Status: COMPLETED | OUTPATIENT
Start: 2024-06-16 | End: 2024-06-16

## 2024-06-16 RX ORDER — FAMOTIDINE 10 MG/ML
20 INJECTION INTRAVENOUS AS NEEDED
Status: COMPLETED | OUTPATIENT
Start: 2024-06-16 | End: 2024-06-16

## 2024-06-16 RX ORDER — DEXTROSE 50 % IN WATER (D50W) INTRAVENOUS SYRINGE
25 ONCE
Status: COMPLETED | OUTPATIENT
Start: 2024-06-16 | End: 2024-06-16

## 2024-06-16 RX ORDER — DEXTROSE MONOHYDRATE 100 MG/ML
50 INJECTION, SOLUTION INTRAVENOUS CONTINUOUS
Status: DISCONTINUED | OUTPATIENT
Start: 2024-06-16 | End: 2024-06-17 | Stop reason: HOSPADM

## 2024-06-16 RX ORDER — FUROSEMIDE 10 MG/ML
20 INJECTION INTRAMUSCULAR; INTRAVENOUS ONCE
Status: DISCONTINUED | OUTPATIENT
Start: 2024-06-16 | End: 2024-06-17 | Stop reason: HOSPADM

## 2024-06-16 RX ORDER — EPINEPHRINE 1 MG/ML
INJECTION INTRAMUSCULAR; INTRAVENOUS; SUBCUTANEOUS
Status: DISCONTINUED
Start: 2024-06-16 | End: 2024-06-17 | Stop reason: HOSPADM

## 2024-06-16 RX ORDER — EPINEPHRINE 1 MG/ML
0.3 INJECTION, SOLUTION, CONCENTRATE INTRAVENOUS EVERY 5 MIN PRN
Status: DISCONTINUED | OUTPATIENT
Start: 2024-06-16 | End: 2024-06-17 | Stop reason: HOSPADM

## 2024-06-16 RX ORDER — NOREPINEPHRINE BITARTRATE/D5W 8 MG/250ML
PLASTIC BAG, INJECTION (ML) INTRAVENOUS
Status: DISCONTINUED
Start: 2024-06-16 | End: 2024-06-17 | Stop reason: HOSPADM

## 2024-06-16 RX ORDER — ALBUTEROL SULFATE 0.83 MG/ML
3 SOLUTION RESPIRATORY (INHALATION) AS NEEDED
Status: COMPLETED | OUTPATIENT
Start: 2024-06-16 | End: 2024-06-16

## 2024-06-16 RX ORDER — PROCHLORPERAZINE EDISYLATE 5 MG/ML
10 INJECTION INTRAMUSCULAR; INTRAVENOUS EVERY 6 HOURS PRN
Status: DISCONTINUED | OUTPATIENT
Start: 2024-06-16 | End: 2024-06-17 | Stop reason: HOSPADM

## 2024-06-16 RX ORDER — PROCHLORPERAZINE MALEATE 10 MG
10 TABLET ORAL EVERY 6 HOURS PRN
Status: CANCELLED | OUTPATIENT
Start: 2024-06-16

## 2024-06-16 RX ORDER — PROCHLORPERAZINE MALEATE 10 MG
10 TABLET ORAL EVERY 6 HOURS PRN
Status: DISCONTINUED | OUTPATIENT
Start: 2024-06-16 | End: 2024-06-17 | Stop reason: HOSPADM

## 2024-06-16 ASSESSMENT — PAIN SCALES - GENERAL
PAINLEVEL_OUTOF10: 0 - NO PAIN

## 2024-06-16 ASSESSMENT — PAIN - FUNCTIONAL ASSESSMENT
PAIN_FUNCTIONAL_ASSESSMENT: 0-10

## 2024-06-16 NOTE — NURSING NOTE
1858: Code blue-pt unresponsive. Respiratory therapy at bedside, ambu bag at 100%.  Femoral pulse palpable but weak.     1901: femoral pulse palpable. Anesthesia bedside for intubation.    1902: 1 mg epi given    1904: no pulse. Compressions initiated. 1mg epi given    1905: unable to get blood pressure. Levo gtt initiated at 0.1 mcg. Pt intubated by anesthesia.     1906: femoral pulse palpable. 1 amp bicarb given.     1907: 1 mg epi given    1908: femoral pulse palpable. Sbp 168    1909: I amp bicarb given. End title co2 32    1912: pt transferred to MICU

## 2024-06-16 NOTE — PROGRESS NOTES
"Jailene Lacy is a 73 y.o. female on day 5 of admission presenting with High grade B-cell lymphoma (Multi).    Subjective   No acute events over night. Afebrile. Feels ok today. Has some swelling in her legs. Tearful at times. Denies SOB. CP. Remaining ROS negative.          Objective     Physical Exam  Constitutional:       General: She is not in acute distress.  HENT:      Head: Normocephalic.      Mouth/Throat:      Mouth: Mucous membranes are moist.      Comments: Tongue lesions present   Eyes:      Extraocular Movements: Extraocular movements intact.      Pupils: Pupils are equal, round, and reactive to light.   Neck:      Comments: Several enlarged lymph nodes, L > R   Cardiovascular:      Rate and Rhythm: Normal rate and regular rhythm.      Pulses: Normal pulses.      Heart sounds: Normal heart sounds.   Pulmonary:      Effort: Pulmonary effort is normal. No respiratory distress.      Breath sounds: No wheezing or rhonchi.   Abdominal:      General: Bowel sounds are normal. There is no distension.      Palpations: Abdomen is soft.   Musculoskeletal:         General: Normal range of motion.      Cervical back: Normal range of motion.      Right lower leg: Edema present.      Left lower leg: Edema present.   Lymphadenopathy:      Cervical: Cervical adenopathy present.   Skin:     General: Skin is warm and dry.      Capillary Refill: Capillary refill takes less than 2 seconds.   Neurological:      Mental Status: She is alert and oriented to person, place, and time.         Last Recorded Vitals  Blood pressure 129/71, pulse 86, temperature 36.4 °C (97.5 °F), temperature source Temporal, resp. rate 24, height 1.694 m (5' 6.69\"), weight 64.3 kg (141 lb 12.1 oz), SpO2 100%.  Intake/Output last 3 Shifts:  I/O last 3 completed shifts:  In: 4207 (68.5 mL/kg) [I.V.:4207 (68.5 mL/kg)]  Out: 0 (0 mL/kg)   Weight: 61.4 kg     Relevant Results  Scheduled medications  acyclovir, 400 mg, oral, q12h ADDI  allopurinol, 300 mg, " oral, BID  dexAMETHasone, 6 mg, oral, q12h ADDI  furosemide, 20 mg, intravenous, Once  [Held by provider] heparin (porcine), 5,000 Units, subcutaneous, q8h  hydroCHLOROthiazide, 12.5 mg, oral, Daily  lidocaine, 5 mL, infiltration, Once  metoprolol tartrate, 75 mg, oral, BID  pantoprazole, 40 mg, oral, Daily before breakfast  perflutren protein A microsphere, 0.5 mL, intravenous, Once in imaging  sulfur hexafluoride microsphr, 2 mL, intravenous, Once in imaging      Continuous medications  sodium chloride 0.9%, 100 mL/hr, Last Rate: 100 mL/hr (06/16/24 1338)      PRN medications  PRN medications: albuterol, alteplase, alteplase, dextrose, EPINEPHrine HCl, LORazepam, prochlorperazine, prochlorperazine, sodium chloride    Results for orders placed or performed during the hospital encounter of 06/11/24 (from the past 24 hour(s))   Renal function panel   Result Value Ref Range    Glucose 114 (H) 74 - 99 mg/dL    Sodium 138 136 - 145 mmol/L    Potassium 4.7 3.5 - 5.3 mmol/L    Chloride 114 (H) 98 - 107 mmol/L    Bicarbonate 17 (L) 21 - 32 mmol/L    Anion Gap 12 10 - 20 mmol/L    Urea Nitrogen 41 (H) 6 - 23 mg/dL    Creatinine 1.64 (H) 0.50 - 1.05 mg/dL    eGFR 33 (L) >60 mL/min/1.73m*2    Calcium 7.2 (L) 8.6 - 10.6 mg/dL    Phosphorus 2.6 2.5 - 4.9 mg/dL    Albumin 3.0 (L) 3.4 - 5.0 g/dL   Lactate dehydrogenase   Result Value Ref Range    LDH 6,076 (H) 84 - 246 U/L   Uric Acid   Result Value Ref Range    Uric Acid 4.2 2.3 - 6.7 mg/dL   Red Top   Result Value Ref Range    Extra Tube Hold for add-ons.    Renal Function Panel   Result Value Ref Range    Glucose 119 (H) 74 - 99 mg/dL    Sodium 140 136 - 145 mmol/L    Potassium 5.0 3.5 - 5.3 mmol/L    Chloride 116 (H) 98 - 107 mmol/L    Bicarbonate 18 (L) 21 - 32 mmol/L    Anion Gap 11 10 - 20 mmol/L    Urea Nitrogen 42 (H) 6 - 23 mg/dL    Creatinine 1.69 (H) 0.50 - 1.05 mg/dL    eGFR 32 (L) >60 mL/min/1.73m*2    Calcium 7.0 (L) 8.6 - 10.6 mg/dL    Phosphorus 2.8 2.5 - 4.9  mg/dL    Albumin 2.6 (L) 3.4 - 5.0 g/dL   Magnesium   Result Value Ref Range    Magnesium 2.06 1.60 - 2.40 mg/dL   Uric Acid   Result Value Ref Range    Uric Acid 4.2 2.3 - 6.7 mg/dL   Lactate Dehydrogenase   Result Value Ref Range    LDH 5,852 (H) 84 - 246 U/L   Fibrinogen   Result Value Ref Range    Fibrinogen 80 (LL) 200 - 400 mg/dL   CBC and Auto Differential   Result Value Ref Range    WBC 98.9 (HH) 4.4 - 11.3 x10*3/uL    nRBC 0.7 (H) 0.0 - 0.0 /100 WBCs    RBC 2.59 (L) 4.00 - 5.20 x10*6/uL    Hemoglobin 7.4 (L) 12.0 - 16.0 g/dL    Hematocrit 25.2 (L) 36.0 - 46.0 %    MCV 97 80 - 100 fL    MCH 28.6 26.0 - 34.0 pg    MCHC 29.4 (L) 32.0 - 36.0 g/dL    RDW 16.1 (H) 11.5 - 14.5 %    Platelets 128 (L) 150 - 450 x10*3/uL    Immature Granulocytes %, Automated 3.4 (H) 0.0 - 0.9 %    Immature Granulocytes Absolute, Automated 3.38 (H) 0.00 - 0.50 x10*3/uL   Manual Differential   Result Value Ref Range    Neutrophils %, Manual 29.2 40.0 - 80.0 %    Bands %, Manual 0.8 0.0 - 5.0 %    Lymphocytes %, Manual 11.7 13.0 - 44.0 %    Monocytes %, Manual 2.5 2.0 - 10.0 %    Eosinophils %, Manual 0.0 0.0 - 6.0 %    Basophils %, Manual 0.0 0.0 - 2.0 %    Atypical Lymphocytes %, Manual 54.1 0.0 - 2.0 %    Metamyelocytes %, Manual 1.7 0.0 - 0.0 %    Seg Neutrophils Absolute, Manual 28.88 (H) 1.60 - 5.00 x10*3/uL    Bands Absolute, Manual 0.79 (H) 0.00 - 0.50 x10*3/uL    Lymphocytes Absolute, Manual 11.57 (H) 0.80 - 3.00 x10*3/uL    Monocytes Absolute, Manual 2.47 (H) 0.05 - 0.80 x10*3/uL    Eosinophils Absolute, Manual 0.00 0.00 - 0.40 x10*3/uL    Basophils Absolute, Manual 0.00 0.00 - 0.10 x10*3/uL    Atypical Lymphs Absolute, Manual 53.50 (H) 0.00 - 0.30 x10*3/uL    Metamyelocytes Absolute, Manual 1.68 0.00 - 0.00 x10*3/uL    Total Cells Counted 120     Neutrophils Absolute, Manual 29.67 (H) 1.60 - 5.50 x10*3/uL    RBC Morphology See Below     Polychromasia Mild     RBC Fragments Few     Deirdre Cells Few    Prepare Cryoprecipitated  AHF (Pooled Units): 1 Pools   Result Value Ref Range    PRODUCT CODE C4781G39     Unit Number [id number]     Unit ABO O     Unit RH POS     Dispense Status TR     Blood Expiration Date June 16, 2024 18:15 EDT     PRODUCT BLOOD TYPE 5100     UNIT VOLUME 83      NM PET CT lymphoma staging    Result Date: 6/14/2024  Interpreted By:  Emile Perea  and Roselia Rios STUDY: NM PET CT LYMPHOMA STAGING;  6/14/2024 10:25 am   INDICATION: Signs/Symptoms:Lymphoma staging. DLBCL versus high-grade lymphoma. Bone marrow biopsy pending.   COMPARISON: None.   ACCESSION NUMBER(S): [id number]   ORDERING CLINICIAN: PEPE DUGAN   TECHNIQUE: DIVISION OF NUCLEAR MEDICINE POSITRON EMISSION TOMOGRAPHY (PET-CT)   The patient received an intravenous dose of 12.9 mCi of Fluorine-18 fluorodeoxyglucose (FDG).  Positron emission tomographic (PET) images from mid thigh to vertex of the head  were then acquired after a one hour delay. Also acquired was a contemporaneous low dose non-contrast CT scan performed for attenuation correction of PET images and anatomic localization.  The PET and CT images were digitally fused for display.  All images were acquired on a combined PET-CT scanner unit.  Some areas of FDG accumulation may be described in standardized uptake value (SUV) units.   CODING: Initial Treatment Strategy (PI)   CALIBRATION: Dose Injection-to-Scan Interval (mins): 71 min Mediastinal bloodpool SUV (normal 1.5-2.5): 2.0 Blood glucose: 155 mg/dL   FINDINGS: HEAD AND NECK: No evidence of focal hypermetabolic lesion in the partially visualized brain parenchyma, noting that evaluation is limited because of the expected physiologic diffuse FDG uptake in the brain. There is diffuse hypermetabolic activity involving the region of the left parotid gland with SUV max of 8.0. There are multiple enlarged and hypermetabolic cervical lymph nodes involving level 1 and 2 with SUV max of 4.1.     CHEST: No focal hypermetabolic lesion is seen in  the lung parenchyma. There are multiple hypermetabolic supraclavicular, axillary, mediastinal and hilar lymph nodes. For instance, there is a hypermetabolic right supraclavicular lymph node with SUV max of 5.6, bilateral axillary lymphadenopathy with SUV max of 2.6 on the right and 3.2 on the left, right upper paratracheal lymph node with SUV max of 2.9 right lower paratracheal lymph node with SUV max of 3.4 right hilar lymph node with SUV max of 4.1 left hilar lymph node with SUV max of 3.4, subcarinal lymph node with SUV max of 4.2, prominent right internal mammary lymph node with SUV max of 3.7 left internal mammary lymph node with SUV max of 2.3.. Small bilateral pleural effusions with consolidative opacities seen within the left lower lobe without significantly increased hypermetabolic activity. Mildly hypermetabolic subcutaneous soft tissue nodules overlying the right anterior 5th rib.   ABDOMEN AND PELVIS: No hypermetabolic soft tissue lesion is present in the abdomen and pelvis. There are enlarged mesenteric lymph nodes with SUV max of 5.3. there are multiple enlarged pelvic sidewall lymph nodes with SUV max of 3.5 at the right external iliac lymph node station and 3.6 on the left. There are multiple enlarged and hypermetabolic inguinal lymph nodes SUV max of 3.3 on the left and 3.6 on the right. Splenomegaly with diffusely increased FDG uptake within the spleen as compared to the liver. Otherwise, physiologic radiotracer uptake is present in the liver and spleen with excretion into the bowel loops and the genitourinary tract. Left adnexal cystic lesion is visualized, without FDG avidity. Moderate free fluid is seen within the pelvis.   MUSCULOSKELETAL: No focal hypermetabolic lesion is seen in the axial or appendicular to suggest osseous metastasis. Diffusely increased FDG uptake is seen in the bone marrow, possible representing marrow hyperplasia related to the patient's history of anaemia versus  lymphomatous involvement.       1. Innumerable hypermetabolic cervical, abdominopelvic lymph nodes consistent with an active lymphomatous process. 2. Splenomegaly with diffusely increased activity throughout the spleen, is compatible with splenic involvement. 3. Diffusely increased FDG uptake is seen in the bone marrow, possible representing marrow hyperplasia related to the patient's history of anaemia but is concerning for lymphomatous involvement. Consider correlation with bone marrow biopsy.     I personally reviewed the image(s) / study and agree with the findings and interpretation as stated. This study was interpreted at [facility].   Signed by: Emile Perea 6/14/2024 12:51 PM Dictation workstation:   [id number]    Bedside PICC Imaging    Result Date: 6/12/2024  These images are not reportable by radiology and will not be interpreted by  Radiologists.    Onco-Echo Complete (Strain & 3D)    Result Date: 6/12/2024   [facility], [address] [place] [address]                Tel [phone] and Fax [phone] TRANSTHORACIC ECHOCARDIOGRAM REPORT  Patient Name:      SANDOVAL Giordano Physician:    [id number] Vargas Karimi MD Study Date:        6/12/2024            Ordering Provider:    [id number] PEPE DUGAN MRN/PID:           [id number]             Fellow:               [id number] Lisa Rodriguez MD Accession#:        [id number]         Nurse:                Mian Rubio RN Date of Birth/Age: 1950 / 73 years Sonographer:          Shantel Stallworth RDCS Gender:            F                    Additional Staff: Height:                                 Admit Date:           6/10/2024 Weight:                                 Admission Status:     Inpatient -                                                                Routine BSA / BMI:         m2 / kg/m2           Encounter#:           [id number]                                         Department Location:  [place] [address] Blood Pressure: 93 /54 mmHg Study Type:    ONCO-ECHO COMPLETE (STRAIN AND 3D) Diagnosis/ICD: Encounter for other preprocedural examination-Z01.818 Indication:    Acute leukemia CPT Code:      Echo Complete w Full Doppler-34249; 3D Rendering w/o independent                workstation-[id number]; Myocardial Strain Imaging-[id number] Patient History: Pertinent History: Acute leukemia; HTN. Study Detail: The following Echo studies were performed: 2D, M-Mode, Doppler,               color flow, Strain and 3D. Technically challenging study due to               body habitus. Definity used as a contrast agent for endocardial               border definition. Total contrast used for this procedure was 1 mL               via IV push.  PHYSICIAN INTERPRETATION: Left Ventricle: The left ventricular systolic function is hyperdynamic, with an estimated ejection fraction of 70-75%. There are no regional wall motion abnormalities. The left ventricular cavity size is normal. The left ventricular septal wall thickness is normal. There is normal left ventricular posterior wall thickness. Left Ventricular Global Longitudinal Strain - 17.2 %. Spectral Doppler shows a normal pattern of left ventricular diastolic filling. Left Atrium: The left atrium is normal in size. Right Ventricle: The right ventricle is normal in size. There is normal right ventricular global systolic function. Right Atrium: The right atrium is normal in size. Aortic Valve: The aortic valve is trileaflet. There is no evidence of aortic valve regurgitation. The peak instantaneous gradient of the aortic valve is 7.2 mmHg. Mitral Valve: The mitral valve is normal in structure. There is mild mitral annular calcification. There is trace mitral valve regurgitation. Tricuspid Valve:  The tricuspid valve is structurally normal. There is trace tricuspid regurgitation. The Doppler estimated RVSP is within normal limits at 29.4 mmHg. Pulmonic Valve: The pulmonic valve is not well visualized. There is physiologic pulmonic valve regurgitation. Pericardium: There is a trivial pericardial effusion. Aorta: The aortic root is normal. The aortic root appears normal in size and measures 3.30 cm. There is no dilatation of the ascending aorta. Systemic Veins: The inferior vena cava appears to be of normal size. There is IVC inspiratory collapse greater than 50%. In comparison to the previous echocardiogram(s): There are no prior studies on this patient for comparison purposes.  ONCO-CARDIOLOGY: Vital Signs: The patients heart rate during the study was 109 beats per minute. The patients blood pressure was 93/54 during the study. Machine: This study was performed on the [employer] Epic. Comments: The LV strain is likely underestimated due to poor tracking.  Onco-Cardiology Measurements: Current Measurements 2D EF (Biplane)                   70.6% 3D EF                             70.2% Global Longitudinal Strain (GLS) -17.2% GLS Tracking Quality: Poor  CONCLUSIONS:  1. Left ventricular systolic function is hyperdynamic with a 70-75% estimated ejection fraction.  2. RVSP within normal limits.  3. Normal aortic root.  4. LV global longitudinal strain is -17.2% but likely underestimated due to poor tracking. QUANTITATIVE DATA SUMMARY: 2D MEASUREMENTS:                    Normal Ranges: Ao Root d: 3.30 cm (2.0-3.7cm) IVSd:      0.70 cm (0.6-1.1cm) LVPWd:     0.70 cm (0.6-1.1cm) LVIDd:     4.00 cm (3.9-5.9cm) LVIDs:     2.80 cm LV % FS    30.0 % LA VOLUME:                             Normal Ranges: LA Vol A4C:        32.3 ml  (22+/-6mL/m2) LA Area A4C:       13.2 cm2 LA Major Axis A4C: 4.6 cm RA VOLUME BY A/L METHOD:                       Normal Ranges: RA Area A4C: 13.8 cm2 LV SYSTOLIC FUNCTION BY 2D PLANIMETRY (MOD):                                           Normal Ranges: EF-A4C View:                      73.6 % (>=55%) EF-A2C View:                      74.9 % EF-Biplane:                       74.2 % Global Longitudinal Strain (GLS): 17.2 % LV DIASTOLIC FUNCTION:                               Normal Ranges: MV Peak E:        0.71 m/s    (0.7-1.2 m/s) MV Peak A:        0.55 m/s    (0.42-0.7 m/s) E/A Ratio:        1.29        (1.0-2.2) MV e'             0.12 m/s    (>8.0) MV lateral e'     0.11 m/s MV medial e'      0.12 m/s MV A Dur:         148.00 msec E/e' Ratio:       6.18        (<8.0) MV DT:            193 msec    (150-240 msec) PulmV Sys Praful:    56.10 cm/s PulmV Lund Praful:   43.70 cm/s PulmV S/D Praful:    1.30 PulmV A Revs Praful: 29.10 cm/s PulmV A Revs Dur: 124.00 msec MITRAL VALVE:                 Normal Ranges: MV DT: 193 msec (150-240msec) AORTIC VALVE:                         Normal Ranges: AoV Vmax:      1.34 m/s (<=1.7m/s) AoV Peak P.2 mmHg (<20mmHg) LVOT Max Praful:  1.12 m/s (<=1.1m/s) LVOT VTI:      25.50 cm LVOT Diameter: 1.90 cm  (1.8-2.4cm) AoV Area,Vmax: 2.37 cm2 (2.5-4.5cm2)  RIGHT VENTRICLE: RV Basal 3.90 cm RV Mid   3.38 cm RV Major 6.6 cm TAPSE:   22.2 mm RV s'    0.20 m/s TRICUSPID VALVE/RVSP:                             Normal Ranges: Peak TR Velocity: 2.57 m/s RV Syst Pressure: 29.4 mmHg (< 30mmHg) IVC Diam:         1.38 cm PULMONIC VALVE:                         Normal Ranges: PV Accel Time: 85 msec  (>120ms) PV Max Praful:    0.9 m/s  (0.6-0.9m/s) PV Max PG:     3.4 mmHg Pulmonary Veins: PulmV A Revs Dur: 124.00 msec PulmV A Revs Praful: 29.10 cm/s PulmV Lund Praful:   43.70 cm/s PulmV S/D Praful:    1.30 PulmV Sys Praful:    56.10 cm/s  [id number] Vargas Karimi MD Electronically signed on 2024 at 12:04:55 PM  ** Final **     US head neck soft tissue    Result Date: 6/10/2024  EXAMINATION: THYROID ULTRASOUND 6/10/2024 COMPARISON: CT neck from the same day. HISTORY: ORDERING SYSTEM PROVIDED HISTORY: abnormal imaging,  concern for thyroid nodule/mass TECHNOLOGIST PROVIDED HISTORY: Reason for exam:->abnormal imaging, concern for thyroid nodule/mass What reading provider will be dictating this exam?->CRC FINDINGS: Right thyroid lobe: 4.2 x 1.3 x 1.5cm Left thyroid lobe: 5.6 x 2.4 x 2.2cm Isthmus: 0.4cm Echotexture: Diffuse thyroid heterogeneity. Vascularity: Normal vascularity. Thyroid Nodules: Bilateral thyroid nodules.  Largest and/or most concerning nodules will be described.  The largest nodule on the right measures 14 mm and has a spongiform (TR 2) appearance. NODULE: Left 1 Size: 23 x 21 x 20 mm Location: Left midpole 1. Composition:  Solid (2) 2. Echogenicity:  Hypoechoic (2) 3. Shape:  Wider-than-tall (0) 4. Margins:  Ill-defined (0) 5. Echogenic foci:  Punctate echogenic foci (3) ACR TI-RADS total points:  7 ACR TI-RADS risk category: TR 5 Prior biopsy: No NODULE: Left 2 Size: 15 x 13 x 12 mm Location: Left lower pole 1. Composition:  Solid (2) 2. Echogenicity:  Hypoechoic (2) 3. Shape:  Wider-than-tall (0) 4. Margins:  Ill-defined (0) 5. Echogenic foci:  Punctate echogenic foci (3) ACR TI-RADS total points:  7 ACR TI-RADS risk category: TR 5 Prior biopsy: No Soft tissues: There are multiple lymph nodes in the right and left neck soft tissues.  The lymph nodes have a markedly abnormal appearance with very thickened cortices.  Please reference CT neck report from the same day.    1.  Mild thyroid heterogeneity.  Normal vascularity. 2.  Bilateral thyroid nodules.  The nodules described above on the left meet criteria for FNA.  Left midpole TR 5 nodule and left lower pole TR 5 nodule. 3.  Abnormal lymphadenopathy partially evaluated on this study.  Left and right neck lymph nodes with very thickened cortices.  Please reference CT neck report from the same day. ACR TI-RADS recommendations: TR5 (>= 7 points):  FNA if >= 1 cm; follow-up if 0.5-0.9 cm in 1, 2, 3, 4, and 5 years TR4 (4-6 points):  FNA if >= 1.5 cm; follow-up if  1.0-1.4 cm in 1, 2, 3, and 5 years TR3 (3 points):  FNA if >= 2.5 cm; follow-up if 1.5-2.4 cm in 1, 3, and 5 years TR2 (2 points):  No FNA or follow-up TR1 (0 points):  No FNA or follow-up ACR TI-RADS recommends that no more than two nodules with the highest ACR TI-RADS point total should be biopsied and no more than four nodules should be followed. RECOMMENDATIONS: Recommend ultrasound-guided FNA tissue evaluation of the left midpole TR 5 nodule measuring 23 mm and seen on image 51.  Recommend FNA tissue evaluation of the left lower pole TR 5 nodule measuring 15 mm seen on image 55. Consider FNA tissue evaluation of lymph nodes in the right and left neck soft tissues at that time.    CT chest wo IV contrast    Result Date: 6/10/2024  EXAMINATION: CT OF THE CHEST WITHOUT CONTRAST 6/10/2024 1:13 pm TECHNIQUE: CT of the chest was performed without the administration of intravenous contrast. Multiplanar reformatted images are provided for review. Automated exposure control, iterative reconstruction, and/or weight based adjustment of the mA/kV was utilized to reduce the radiation dose to as low as reasonably achievable. COMPARISON: CT of the abdomen and pelvis from earlier today. HISTORY: ORDERING SYSTEM PROVIDED HISTORY: ro mets TECHNOLOGIST PROVIDED HISTORY: Reason for exam:->ro mets Decision Support Exception - unselect if not a suspected or confirmed emergency medical condition->Emergency Medical Condition (MA) FINDINGS: Mediastinum: There is prominent mediastinal lymphadenopathy with a precarinal lymph node measuring 1.9 cm in short axis diameter.  There is lymphadenopathy in the right paratracheal, subcarinal, and periaortic regions. There is fullness of the colby bilaterally suggesting hilar adenopathy. Absence of intravenous contrast limits sensitivity for hilar adenopathy. There is thickening of the wall of the inferior thoracic esophagus, reflux esophagitis versus esophageal malignancy.  Suggest correlation  with EGD. heart and pericardium are unremarkable. Lungs/pleura: There is a 3 mm subpleural nodule in the anterior-lateral right lower lobe towards the lung base, axial image 88 series 4. There is a tiny 2 mm noncalcified subpleural nodule in the anterior-lateral right middle lobe, axial image 60 series 4. These are nonspecific and are most likely scarring from previous inflammatory disease, although malignancy cannot be entirely excluded. There is mild patchy infiltrate in the posterior and lateral left lung base, atelectasis versus pneumonia versus scarring. There is mild patchy subpleural scarring in the apices from previous inflammatory disease. Upper Abdomen: Please see the preceding CT of the abdomen and pelvis report regarding significant findings in the upper abdomen. Soft Tissues/Bones: There is moderate bilateral axillary lymphadenopathy, the largest right-sided lymph node having a short axis diameter of 2.1 cm and the largest lymph node on the left having short axis diameter of 2.6 cm. There is a cystic structure in the lower pole of the left lobe of the thyroid measuring 2.0 cm in diameter with additional smaller cystic regions in the upper pole.  These could be further evaluated with ultrasound on a nonemergent basis. There is moderate scoliosis of the thoracic spine convex towards the right.    1. Prominent mediastinal and bilateral axillary lymphadenopathy. This is concerning for lymphoma or metastatic disease. 2. Thickening of the wall of the inferior thoracic esophagus, reflux esophagitis versus esophageal malignancy. Suggest correlation with EGD. 3. Mild patchy infiltrate in the posterior and lateral left lung base, atelectasis versus pneumonia versus scarring. 4. Tiny noncalcified subpleural nodules in the right middle and right lower lobes, most likely scarring from previous inflammatory disease, although malignancy cannot be entirely excluded. 5. Cystic structures in the left lobe of the thyroid.  These could be further evaluated with ultrasound on a nonemergent basis. RECOMMENDATIONS: Suggest ultrasound of the thyroid on a nonemergent basis. Suggest EGD for evaluation of thickened wall of the inferior thoracic esophagus.    CT abdomen pelvis wo IV contrast    Result Date: 6/10/2024  EXAMINATION: CT OF THE ABDOMEN AND PELVIS WITHOUT CONTRAST6/10/2024 1:13 pm TECHNIQUE: CT of the abdomen and pelvis was performed without the administration of intravenous contrast. Multiplanar reformatted images are provided for review. Automated exposure control, iterative reconstruction, and/or weight based adjustment of the mA/kV was utilized to reduce the radiation dose to as low as reasonably achievable. COMPARISON: 09/22/2021. HISTORY: ORDERING SYSTEM PROVIDED HISTORY: ro mets, lymphadenopathy, anemia TECHNOLOGIST PROVIDED HISTORY: Reason for exam:->ro mets, lymphadenopathy, anemia Additional Contrast?->None Decision Support Exception - unselect if not a suspected or confirmed emergency medical condition->Emergency Medical Condition (MA) FINDINGS: Evaluation of the lung bases is deferred to the dedicated CT of the chest performed the same day.  Small left pleural effusion and left basilar airspace disease are present. Multiple hepatic cysts are again identified and are not significantly changed.  There is no gross evidence of new solid hepatic lesion although evaluation of the solid organs is limited secondary to lack of intravenous contrast.  There is new splenomegaly with the spleen measuring 13.8 x 10.0 by  16.5 cm.  The pancreas, kidneys, abdominal aorta, uterus and right adnexa have an unremarkable appearance.  There is a 4.1 x 3.1 cm cystic lesion in the left adnexa which measures 15 Hounsfield units and likely represents a simple cyst.  This is not significantly changed since the prior examination. There is a small to moderate amount of free fluid within the pelvis.  There is no evidence of large or small bowel  obstruction or inflammation.  There is no evidence of a dilated vermiform appendix. There are numerous enlarged gastrohepatic ligament, gastroesophageal, carlos hepatic, portacaval, peripancreatic, left periaortic, anterior periaortic, interaortocaval retroperitoneal, bilateral common iliac, mesentery, bilateral external iliac, , bilateral common femoral and left internal iliac lymph nodes.  There is a small fat containing left femoral hernia which contains a borderline enlarged lymph node.  Enlarged paracardiac lymph nodes are also identified. Bone windows reveal no evidence of acute fracture.  There is levoconvex scoliosis of the lumbar spine with moderate degenerative changes, particularly at L3-4 and L4-5.  No osteolytic or osteoblastic lesion is seen.    Splenomegaly with extensive abdominal and pelvic lymph node enlargement are consistent with lymphoma/leukemia or other metastatic disease. Small to moderate amount of free fluid in the pelvis. Stable 4.1 cm left adnexal cystic lesion.    CT soft tissue neck wo IV contrast    Result Date: 6/10/2024  EXAMINATION: CT OF THE NECK WITHOUT CONTRAST  6/10/2024 TECHNIQUE: CT of the neck was performed without the administration of intravenous contrast. Multiplanar reformatted images are provided for review. Automated exposure control, iterative reconstruction, and/or weight based adjustment of the mA/kV was utilized to reduce the radiation dose to as low as reasonably achievable. COMPARISON: None HISTORY: ORDERING SYSTEM PROVIDED HISTORY: lymphadenopathy, right submandibular and left preauricular TECHNOLOGIST PROVIDED HISTORY: Reason for exam:->lymphadenopathy, right submandibular and left preauricular Decision Support Exception - unselect if not a suspected or confirmed emergency medical condition->Emergency Medical Condition (MA) FINDINGS: PHARYNX/LARYNX:  The tonsillar pillars are normal in appearance.  The tongue is normal in appearance.  The valleculae,  epiglottis, aryepiglottic folds and pyriform sinuses appear unremarkable.  The true and false vocal cords are normal in appearance.  No mass or abscess is seen. SALIVARY GLANDS/THYROID:  The parotid and submandibular glands appear unremarkable. There is a hypodense nodule within the left lobe of the thyroid gland measuring 2.2 cm.  Thyroid ultrasound is suggested for further evaluation. LYMPH NODES:  Moderate diffuse bilateral cervical lymphadenopathy is identified involving the jugular chains as well as the submandibular region bilaterally.  The most prominent lymph node is located within the right supraclavicular region measuring 2 cm and within the left level 2 jugular chain measuring 2.3 cm.  The largest right submandibular lymph node measures 1.8 cm. SOFT TISSUES:  No appreciable soft tissue swelling or mass is seen. BRAIN/ORBITS/SINUSES:  The visualized portion of the intracranial contents appear unremarkable.  The visualized portion of the orbits, paranasal sinuses and mastoid air cells demonstrate no acute abnormality. LUNG APICES/SUPERIOR MEDIASTINUM:  No focal consolidation is seen within the visualized lung apices.  No superior mediastinal lymphadenopathy or mass. The visualized portion of the trachea appears unremarkable. BONES:  No aggressive appearing lytic or blastic bony lesion.    1. Moderate diffuse bilateral cervical lymphadenopathy.  These changes may be related to a lymphoproliferative disorder such as lymphoma.  Biopsy may be considered. 2. 2.2 cm left thyroid nodule.  Thyroid ultrasound is suggested for further evaluation.       Assessment/Plan   Principal Problem:    High grade B-cell lymphoma (Multi)  Active Problems:    Tumor lysis syndrome (HHS-HCC)    Admission for chemotherapy    Lymphadenopathy      Jailene Lacy is a 73 y.o. female with PMHx of HTN, HLD, presents to OSH with leukocytosis of 60k and diffuse lymphadenopathy of the face/neck who is being transferred to [facility] for further  evaluation and treatment.      ONC:  # ?DLBCL vs high grade lymphoma   Presented with leukocytosis of 60k, now 96.9 (6/15) related to disease   -flow cytometry favoring lymphoid malignancy      - initiate dexamethasone 4mg q12h, increased to 6mg q12h 6/15  - Added Rituxan  for concern for worsening disease while awaiting final diagnosis   -Onco-Echo EF 70-75%  -BMBx, pending  - Consult ENT for lymph node biopsy, planned for  but unable to be completed due to OR space, planned again for   - PET/CT: 1. Innumerable hypermetabolic cervical, abdominopelvic lymph nodes  consistent with an active lymphomatous process.  2. Splenomegaly with diffusely increased activity throughout the spleen, is compatible with splenic involvement.  3. Diffusely increased FDG uptake is seen in the bone marrow, possible representing marrow hyperplasia related to the patient's history of anaemia but is concerning for lymphomatous involvement. Consider correlation with bone marrow biopsy.     HEME:  #Anemia, chronic  -hold home ferrous sulfate   - Transfuse for Hgb < 7 Plt < 10  - S/P cryo  for fibrinogen of 80     Ppx: heparin 5000u q8h, on hold for OR      ID:  [id number]  -no evidence of active infection on admit  -covid swab on admit  -plan Zosyn/panculture/CXR if febrile     Ppx: acyclovir      FEN/GI:  Admit weight: 56.6 kg, current wt: 64.3 kg ()   Low path diet  #YRN, baseline sCr ~1.4-1.8, sCr today: 1.69  -mIVF NS at 100mL/hr  #Hyperuricemia, 2/2 TLS  Uric acid down-trendin.3 --> 8.9-->7.2-->5.7-->4.9  -allopurinol 300mg BID  #Hyperkalemia, 2/2 TLS  -mIVF  #Tumor lysis syndrome  - monitor counts daily, fibrinogen, coags  - LDH increasing, while sCr and uric acid remain relatively stable, will increase Dex to 6mg q12h and continue to monitor   - fibrinogen 80  s/p cryo, will monitor BID for now   - TLS labs q12h  #fluid overload   - lasix 20mg     Ppx: pantoprazole     CARDS:  #Hypertension  -hold  lisinopril 10 mg in setting of YRN  -cont home metoprolol 75 mg BID  -cont home hctz 12.5 mg daily     PSYCH:  #Anxiety  -Ativan 0.5mg q8h PRN     DISPO:  Full Code  DL SOLO PICC  Blood consent on admit  Needs primary oncologist       Pt seen and discussed with Dr Valerie Sanchez, APRN-CNP

## 2024-06-16 NOTE — SIGNIFICANT EVENT
I was called to patient room for tachypnea during Rituxan infusion. Infusion stopped. Rapid Response was alerted and was in the room upon my arrival. Solumedrol 40mg and Pepcid 20mg ordered and given. Patient was very tachypneic and alert. Her work of breathing increased and CXR and ABG were ordered. At approximately 1858 Code Blue was called. Pt unresponsive. Pt lost pulse at approximately 1904 and CPR was initiated. Family was called (significant other, Andrew) and made aware of the event. Pt was transferred to MICU bed 1.     Please see Code Blue noted for further details.

## 2024-06-16 NOTE — NURSING NOTE
RAPID RESPONSE RN NOTE:    RADAR auto page received at 18:09 triggered by vitals signs - temp 36.9, , RR 28, /70, pulse ox 95% on nasal cannula.  On my arrival at 18:23, primary team to bedside; pt being actively treated for presumed Rituximab reaction per protocol - see MAR for meds given. Pt noted to have labored breathing with use of accessory muscles; good air exchange and no wheezing noted at my time of arrival.  Pt very restless in bed.  CXR ordered, respiratory to bedside and ABG sent.  MICU fellow called for evaluation.   18:55 - BP dropped to 89/50 and pt with decreased responsiveness.  18:58 - Pt not responding to sternal rub and breathing agonal.  Rescue breathing initiated with AMBU bag and code blue paged.  Please refer to code blue documentation for details.  Pt transferred to MICU bed 1 with code team.  Family being updated via phone by primary team.

## 2024-06-16 NOTE — CARE PLAN
The patient's goals for the shift include      Problem: Pain  Goal: My pain/discomfort is manageable  Outcome: Progressing     Problem: Safety  Goal: Patient will be injury free during hospitalization  Outcome: Progressing  Goal: I will remain free of falls  Outcome: Progressing     Problem: Daily Care  Goal: Daily care needs are met  Outcome: Progressing     Problem: Psychosocial Needs  Goal: Demonstrates ability to cope with hospitalization/illness  Outcome: Progressing  Goal: Collaborate with me, my family, and caregiver to identify my specific goals  Outcome: Progressing     Problem: Discharge Barriers  Goal: My discharge needs are met  Outcome: Progressing     Problem: Infection related to problem list condition  Goal: Infection will resolve through treatment  Outcome: Progressing     Problem: Skin  Goal: Decreased wound size/increased tissue granulation at next dressing change  Outcome: Progressing  Goal: Participates in plan/prevention/treatment measures  Outcome: Progressing  Goal: Prevent/manage excess moisture  Outcome: Progressing  Goal: Prevent/minimize sheer/friction injuries  Outcome: Progressing  Goal: Promote/optimize nutrition  Outcome: Progressing  Goal: Promote skin healing  Outcome: Progressing     Problem: Fall/Injury  Goal: Not fall by end of shift  Outcome: Progressing  Goal: Be free from injury by end of the shift  Outcome: Progressing  Goal: Verbalize understanding of personal risk factors for fall in the hospital  Outcome: Progressing  Goal: Verbalize understanding of risk factor reduction measures to prevent injury from fall in the home  Outcome: Progressing  Goal: Use assistive devices by end of the shift  Outcome: Progressing  Goal: Pace activities to prevent fatigue by end of the shift  Outcome: Progressing     The clinical goals for the shift include Patient will remain free of injury and falls through end of shift 0700 6/16

## 2024-06-17 VITALS
RESPIRATION RATE: 25 BRPM | HEIGHT: 67 IN | HEART RATE: 111 BPM | SYSTOLIC BLOOD PRESSURE: 79 MMHG | OXYGEN SATURATION: 72 % | TEMPERATURE: 99 F | WEIGHT: 141.76 LBS | BODY MASS INDEX: 22.25 KG/M2 | DIASTOLIC BLOOD PRESSURE: 35 MMHG

## 2024-06-17 LAB
ABO GROUP (TYPE) IN BLOOD: NORMAL
ALBUMIN SERPL BCP-MCNC: <1.5 G/DL (ref 3.4–5)
ALP SERPL-CCNC: 176 U/L (ref 33–136)
ALT SERPL W P-5'-P-CCNC: 92 U/L (ref 7–45)
ANION GAP SERPL CALC-SCNC: 28 MMOL/L (ref 10–20)
ANTIBODY SCREEN: NORMAL
APTT PPP: >200 SECONDS (ref 27–38)
AST SERPL W P-5'-P-CCNC: 398 U/L (ref 9–39)
ATRIAL RATE: 121 BPM
BILIRUB SERPL-MCNC: 0.4 MG/DL (ref 0–1.2)
BUN SERPL-MCNC: 45 MG/DL (ref 6–23)
CALCIUM SERPL-MCNC: 7.7 MG/DL (ref 8.6–10.6)
CHLORIDE SERPL-SCNC: 114 MMOL/L (ref 98–107)
CO2 SERPL-SCNC: 17 MMOL/L (ref 21–32)
CREAT SERPL-MCNC: 1.86 MG/DL (ref 0.5–1.05)
EGFRCR SERPLBLD CKD-EPI 2021: 29 ML/MIN/1.73M*2
GLUCOSE SERPL-MCNC: 398 MG/DL (ref 74–99)
INR PPP: 3.3 (ref 0.9–1.1)
MAGNESIUM SERPL-MCNC: 2.68 MG/DL (ref 1.6–2.4)
P AXIS: 47 DEGREES
P OFFSET: 183 MS
P ONSET: 145 MS
PATH REPORT.COMMENTS IMP SPEC: NORMAL
PATH REPORT.FINAL DX SPEC: NORMAL
PATH REPORT.GROSS SPEC: NORMAL
PATH REPORT.MICROSCOPIC SPEC OTHER STN: NORMAL
PATH REPORT.TOTAL CANCER: NORMAL
POTASSIUM SERPL-SCNC: 6.2 MMOL/L (ref 3.5–5.3)
PR INTERVAL: 146 MS
PROT SERPL-MCNC: <3 G/DL (ref 6.4–8.2)
PROTHROMBIN TIME: 37.8 SECONDS (ref 9.8–12.8)
Q ONSET: 218 MS
QRS COUNT: 20 BEATS
QRS DURATION: 84 MS
QT INTERVAL: 322 MS
QTC CALCULATION(BAZETT): 457 MS
QTC FREDERICIA: 406 MS
R AXIS: -7 DEGREES
RH FACTOR (ANTIGEN D): NORMAL
SODIUM SERPL-SCNC: 153 MMOL/L (ref 136–145)
T AXIS: 32 DEGREES
T OFFSET: 379 MS
VENTRICULAR RATE: 121 BPM

## 2024-06-17 RX ORDER — EPINEPHRINE 0.1 MG/ML
INJECTION INTRACARDIAC; INTRAVENOUS CODE/TRAUMA/SEDATION MEDICATION
Status: COMPLETED | OUTPATIENT
Start: 2024-06-16 | End: 2024-06-16

## 2024-06-17 RX ORDER — CALCIUM CHLORIDE INJECTION 100 MG/ML
INJECTION, SOLUTION INTRAVENOUS CODE/TRAUMA/SEDATION MEDICATION
Status: COMPLETED | OUTPATIENT
Start: 2024-06-16 | End: 2024-06-16

## 2024-06-17 RX ORDER — INDOMETHACIN 25 MG/1
CAPSULE ORAL CODE/TRAUMA/SEDATION MEDICATION
Status: COMPLETED | OUTPATIENT
Start: 2024-06-16 | End: 2024-06-16

## 2024-06-17 NOTE — NURSING NOTE
Patient pre-medicated with Tylenol 650 mg PO and Benadryl 50 mg PO before Rituximab. Patient very restless. Verified at bedside with Mandi Montez RN. +BBR checked via syringe aspiration. VSS. Rituximab started at 1455, at 50 mg/hr (24 ml/hr). At 1540, BP 70/40, infusion stopped and rescue meds were given - 40 mg IV Solumedrol, 20 mg IV Pepcid, and 50 mg IV Benadryl. 500cc bolus given over 30 min. VSS after 30 min infusion delay, and restarted Rituximab at 50% (12 ml/hr). Patient tolerated infusion rate, and was increased up to 3rd infusion rate of 150 mg/hr. At 1803, patient reported nausea, given 10 mg IVP Compazine, with a RR of 28, given 2.5 mg Albuterol breathing treatment as per protocol, infusion stopped once again. Rapid and RT arrived at the bedside, followed by MOHAMUD. Patient less responsive, unable to answer orientation questions, labored work of breathing. 2nd round of rescue meds were ordered and given  - 40 mg IVP Solumedrol and 20 mg IVP Pepcid at 1840. ABG ordered and done, as well as 0.3 mg IM Epinephrine given for severe reaction at 1853. Patient unresponsive to sternal rub, unable to obtain VS. Code Blue called 1900, compressions initiated after pulse was lost.

## 2024-06-17 NOTE — SIGNIFICANT EVENT
Called to see patient for unresponsiveness. On exam the patient did not respond to verbal or physical stimuli. Absent heart and breath sounds for one minute, no response to noxious stimuli, and absent corneal reflex (modify if on vent to no spontaneous respirations). Absent peripheral pulses. Pupils are fixed and dilated. Patient pronounced dead at 21:41 (24 hour format). Dr. Randhawa notified. Next of kin Leigh (sister) notified. Autopsy declined.

## 2024-06-17 NOTE — PROCEDURES
Insert arterial line    Date/Time: 6/16/2024 8:15 PM    Performed by: Ilya Schmitz MD  Authorized by: Rand LONGORIA MD    Consent:     Consent obtained:  Emergent situation  Universal protocol:     Test results available: yes      Site/side marked: yes      Patient identity confirmed:  Hospital-assigned identification number and arm band  Indications:     Indications: hemodynamic monitoring and multiple ABGs    Pre-procedure details:     Skin preparation:  Chlorhexidine  Sedation:     Sedation type:  None  Anesthesia:     Anesthesia method:  None  Procedure details:     Location:  L radial    Needle gauge:  20 G    Placement technique:  Seldinger and ultrasound guided    Number of attempts:  2    Transducer: waveform confirmed    Post-procedure details:     Post-procedure:  Biopatch applied, sterile dressing applied and sutured    Procedure completion:  Tolerated

## 2024-06-17 NOTE — PROCEDURES
Intubation    Date/Time: 6/16/2024 7:02 PM    Performed by: Argelia Yancey DO  Authorized by: Argelia Yancey DO    Consent:     Consent obtained:  Emergent situation    Consent given by:  Patient  Universal protocol:     Relevant documents present and verified: yes      Test results available: yes      Required blood products, implants, devices, and special equipment available: yes      Patient identity confirmed:  Anonymous protocol, patient vented/unresponsive  Pre-procedure details:     Indications: cardio/pulmonary arrest      Patient status:  Unresponsive    Look externally: no concerns      Mouth opening - incisor distance:  3 or more finger widths    Hyoid-mental distance: 2 finger widths      Hyoid-thyroid distance: 2 or more finger widths      Mallampati score:  II    Obstruction: none      Neck mobility: normal      Pharmacologic strategy: none    Procedure details:     Preoxygenation:  Bag valve mask    CPR in progress: yes      Number of attempts:  2  Successful intubation attempt details:     Intubation method:  Oral    Intubation technique: direct      Laryngoscope blade:  Mac 4    Bougie used: no      Grade view: I      Tube size (mm):  7.0    Tube type:  Cuffed    Tube visualized through cords: yes    First unsuccessful intubation attempt details:     Intubation method:  Oral    Intubation technique:  Direct    Laryngoscope blade:  Mac 4    Bougie used: no      Grade view: I      Tube size (mm):  7.0    Tube type:  Cuffed    Ventilation between 1st and 2nd attempt: yes with mask      Tube visualized through cords: yes    Placement assessment:     ETT at teeth/gumline (cm):  23    Tube secured with:  ETT ferguson    Breath sounds:  Equal    Placement verification: chest rise, colorimetric ETCO2 and equal breath sounds    Post-procedure details:     Procedure completion:  Tolerated  Comments:      Verification was completed of correct patient, procedure, positioning and necessary equipment.  Suction available.  Patient was placed in the sniffing position. A MAC 4 blade was inserted into the oropharynx at which time the vocal cords were visualized. A 7.0-English endotracheal tube was inserted and visualized going through the vocal cords. The stylette was removed. Colorimetric change was visualized on the CO2 meter x5. Breath sounds were heard in both lung fields equally. The endotracheal tube was placed at 23 cm at the incisors.  The tube was secured in place with tie. Patient transported to MICU with numeric ETCO2 monitor.     COMPLICATIONS:   None

## 2024-06-17 NOTE — SIGNIFICANT EVENT
Patient's family arrived and a family meeting was held regarding patient's critical clinical status and goals of care moving forward. The meeting included patient's direct next of kin, her sister (Leigh). The meeting also included the patient's lifelong significant other (Andrew), her niece and her niece's .The family were notified that patient suffered three cardiac arrests in the past two hours and is currently on three vasopressors to maintain blood pressure. Patient's clinical status was explained that at this moment, her blood pressure is not permissible to insert a central line and start dialysis for her high levels of potassium. Given patient's critical state and multiple cardiac arrest, her chances of suffering another arrest are high, and her chances of meaningful recovery are low. The sister and the family were in agreement that patient would not have wanted further compressions to be pursued in case the patient suffer another cardiac arrest. After this discussion, the family decision was to change patient's code status to Do Not Resuscitate (DNR).

## 2024-06-17 NOTE — CODE DOCUMENTATION
Date of code: 6/16/2024 8:20 PM    Precipitating Events Unknown  Loss of Pulse Yes  ACLS Initiated Yes  Initial Rhythm INITIAL RHYTMN: pulseless electrical activity, followed by V fib  Length of ACLS Performed 2 cycles  Interventions Outside of ACLS 1 amp sodium bicarb  Advanced Airway Yes  Return of Spontaneous Circulation Yes  Cardiology Notified No  Issues for Follow-up None  Targeted Temperature Management NA  Transfer to a Different Location No  Family or Healthcare Power of  Notified Yes  Details of Discussion (I.e. who was notified, changes to code status, etc) Significant other notified, remains full code until sister and other family arrives    Please refer to Code flowsheet for additional documentation.

## 2024-06-17 NOTE — H&P
MEDICINE ADMISSION NOTE    History of Present Illness  Jailene Lacy is a 73 y.o. female with PMHx of HTN, HLD, presented to OSH with leukocytosis of 60k and diffuse lymphadenopathy transferred to  for further evaluation. Patient was admitted to the MICU after a cardiac arrest.     On initial presentation to OSH, patient stated she noticed left mandibular mass slowly enlarge over the last 4 weeks, decreased appetite, with around 7 lbs weight loss.    On admission to  [facility] floor, patient was suspected to have DLBCL vs. high grade lymphoma, leukocytosis increased to 96 since her admission on 6/11. Bone marrow biopsy was obtained on 6/12 and pending. ENT was planning on obtaining lymph node biopsy. PET CT with hypermetabolic diffuse lymph nodes, splenomegaly, diffuse increased FDG uptake in the bone marrow. YRN worsening since her admission, was on maintenance IV fluids, hyperuricemia initially 11.3 downtrended, started on Allopurinol 300, hyperkalemia worsening. Labs c/f tymor lysis syndrome with increasing LDH. Patient was started on dexamethasone 4mg q12h that was later increased to 6 mg q12h on 6/15.     Patient was given her first dose of rituximab on 6/16 around 3 PM, infusion stopped due to hypotension.  Patient was given 40 mg Solu-Medrol, Pepcid and Benadryl and 500 cc bolus LR.  Restarted rituximab at a lower dose afterwards and patient tolerated infusion rate.  Around 6 PM patient was reporting nausea, received IV Compazine and infusion stopped.  Rapid was called at 6:40 PM.  IM epinephrine given for severe reaction.  Patient unresponsive.  CODE BLUE called at 1900.  ROSC achieved patient intubated and transferred to MICU, coded again in the MICU 3 times total.  Arterial line obtained.  ABG with worsening acidosis and lactate.  Patient was on norepinephrine, epinephrine, vasopressin drips.  Family arrived to MICU about an hour later after patient's admission, family decision was to transition patient  "to DNR status.  Few minutes afterwards, patient coded again and pronounced dead at 21:41    Vitals on initial presentation to MICU  BP 92/69   Pulse (!) 111   Temp 37.2 °C (99 °F)   Resp 25   Ht 1.694 m (5' 6.69\")   Wt 64.3 kg (141 lb 12.1 oz)   SpO2 (!) 72%   BMI 22.41 kg/m²    Labs:   CBC: WBC 24.2 , HGB 6.9, PLT 36  BMP: , K 5.5, Cl 116, HCO3 14, BUN 44, CR 1.82, Glu 122  LFTS: AST 85 , ALT 21, ALKPHOS 78 , TBILI 0.4 , DBILI 0.1  TROP: 38  BNP: 23  COAGS: PT 19.4 , PTT 26  , INR 1.7  UA:     ABG:   Results from last 7 days   Lab Units 06/16/24 2057 06/16/24  1927 06/16/24  1901   POCT PH, ARTERIAL pH 6.84* 7.13* 6.98*   POCT PCO2, ARTERIAL mm Hg 39 44* 17*   POCT PO2, ARTERIAL mm Hg 102* 266* 257*   POCT HCO3 CALCULATED, ARTERIAL mmol/L 6.7* 14.6* 4.0*   POCT BASE EXCESS, ARTERIAL mmol/L -25.1* -13.4* -25.7*    CALCIUM 7.1 MAG No results found for requested labs within last 365 days. ALB 2.9 LACTATE 22.3 PHOS No results found for requested labs within last 365 days. COVIDNo results found for requested labs within last 365 days.    EKG  Encounter Date: 06/11/24   Electrocardiogram, 12-lead PRN ACS symptoms   Result Value    Ventricular Rate 121    Atrial Rate 121    IL Interval 146    QRS Duration 84    QT Interval 322    QTC Calculation(Bazett) 457    P Axis 47    R Axis -7    T Axis 32    QRS Count 20    Q Onset 218    P Onset 145    P Offset 183    T Offset 379    QTC Fredericia 406    Narrative    Sinus tachycardia  Inferior infarct (cited on or before 11-JUN-2024)  Possible Anterior infarct (cited on or before 11-JUN-2024)  Abnormal ECG  When compared with ECG of 11-JUN-2024 18:00, (unconfirmed)  No significant change was found        Imaging:  XR chest 1 view    Result Date: 6/16/2024  STUDY: XR CHEST 1 VIEW;  6/16/2024 7:47 pm   INDICATION: Signs/Symptoms:sob.   COMPARISON: CT chest abdomen pelvis 06/10/2024   ACCESSION NUMBER(S): [id number]   ORDERING CLINICIAN: JUDY COHEN   FINDINGS: " AP radiograph of the chest was provided.   Endotracheal tube with its tip terminating approximately 4.6 cm above the level the ger.   CARDIOMEDIASTINAL SILHOUETTE: Cardiomediastinal silhouette is partially obscured secondary to adjacent lung findings, though otherwise normal in size and configuration. Atherosclerotic calcifications of the aortic knob.   LUNGS: Diffuse patchy airspace opacities are noted diffusely throughout the lungs, without a correlate on recent CT chest abdomen pelvis. Increased perihilar prominence and interstitial lung markings. There is blunting of the left-greater-than-right costophrenic angles. No pneumothorax.   ABDOMEN: No remarkable upper abdominal findings.   BONES: No acute osseous changes.       1. Diffuse patchy airspace opacities diffusely throughout the lungs with increased perihilar prominence and interstitial lung markings without a correlate on recent CT, findings which likely represent new diffuse pulmonary edema. 2. Left-greater-than-right small bilateral pleural effusions. 3. Endotracheal tube as detailed above.   I personally reviewed the images/study, and I agree with the findings as stated above. This study was interpreted at [place], [place].   MACRO: None     Dictation workstation:   [id number]      ED Interventions:  Medications   alteplase (Cathflo Activase) injection 2 mg ( intra-catheter MAR Hold 6/16/24 1915)   sodium chloride 0.9% infusion (100 mL/hr intravenous New Bag 6/16/24 1759)   sulfur hexafluoride microsphr (Lumason) injection 24.28 mg ( intravenous MAR Hold 6/16/24 1915)   perflutren protein A microsphere (Optison) injection 0.5 mL ( intravenous MAR Hold 6/16/24 1915)   metoprolol tartrate (Lopressor) tablet 75 mg ( oral Dose Auto Held 6/23/24 2100)   hydroCHLOROthiazide (HYDRODiuril) tablet 12.5 mg ( oral Dose Auto Held 6/23/24 0900)   acyclovir (Zovirax) tablet 400 mg ( oral Dose Auto Held 6/23/24 2100)    pantoprazole (ProtoNix) EC tablet 40 mg ( oral Dose Auto Held 6/20/24 0700)   heparin (porcine) injection 5,000 Units ( subcutaneous Dose Auto Held 6/23/24 2130)   LORazepam (Ativan) tablet 0.5 mg ( oral MAR Hold 6/16/24 1915)   allopurinol (Zyloprim) tablet 300 mg ( oral Dose Auto Held 6/20/24 2100)   lidocaine (Xylocaine) 10 mg/mL (1 %) injection 50 mg ( infiltration MAR Hold 6/16/24 1915)   alteplase (Cathflo Activase) injection 2 mg ( intra-catheter MAR Hold 6/16/24 1915)   dexAMETHasone (Decadron) tablet 6 mg ( oral Dose Auto Held 6/23/24 2100)   prochlorperazine (Compazine) tablet 10 mg ( oral MAR Hold 6/16/24 1915)   prochlorperazine (Compazine) injection 10 mg ( intravenous MAR Hold 6/16/24 1915)   sodium chloride 0.9 % bolus 500 mL ( intravenous MAR Hold 6/16/24 1915)   dextrose 5 % in water (D5W) bolus ( intravenous MAR Hold 6/16/24 1915)   EPINEPHrine HCl (PF) (Adrenalin) injection 0.3 mg ( intramuscular MAR Hold 6/16/24 1915)   furosemide (Lasix) injection 20 mg (has no administration in time range)   insulin regular (HumuLIN R,NovoLIN R) injection 10 Units (10 Units intravenous Given 6/16/24 1949)     Followed by   dextrose 50 % injection 25 g (25 g intravenous Given 6/16/24 1949)     Followed by   dextrose 10 % in water (D10W) infusion ( intravenous Canceled Entry 6/16/24 2000)   vasopressin (Vasostrict) in 5% dextrose 100 mL IV  - Omnicell Override Pull (has no administration in time range)   sodium bicarbonate injection  - Omnicell Override Pull (has no administration in time range)   norepinephrine in dextrose 5 % (Levophed) infusion  - Omnicell Override Pull (  Restarted 6/16/24 2130)   EPINEPHrine (Adrenalin) injection  - Omnicell Override Pull (has no administration in time range)   sodium bicarbonate injection  - Omnicell Override Pull (has no administration in time range)   sodium bicarbonate injection  - Omnicell Override Pull (has no administration in time range)   perflutren lipid  microspheres (Definity) injection 0.5-10 mL of dilution (1 mL of dilution intravenous Given 6/12/24 1120)   magnesium sulfate IV 2 g (0 g intravenous Stopped 6/12/24 1410)   pantoprazole (ProtoNix) injection 40 mg (40 mg intravenous Given 6/14/24 1023)   fludeoxyglucose F-18 injection 12.9 millicurie (12.9 millicuries intravenous Given 6/14/24 0825)   dexAMETHasone (Decadron) tablet 2 mg (2 mg oral Given 6/15/24 1125)   acetaminophen (Tylenol) tablet 650 mg (650 mg oral Given 6/16/24 1317)   diphenhydrAMINE (BENADryl) capsule 50 mg (50 mg oral Given 6/16/24 1317)   riTUXimab-pvvr (Ruxience) 648.75 mg in sodium chloride 0.9% 331.88 mL IV (0 mg intravenous Stopped 6/16/24 1815)   diphenhydrAMINE (BENADryl) injection 50 mg (50 mg intravenous Given 6/16/24 1522)   methylPREDNISolone sod succinate (SOLU-Medrol) 40 mg/mL injection 40 mg (40 mg intravenous Given 6/16/24 1522)   famotidine PF (Pepcid) injection 20 mg (20 mg intravenous Given 6/16/24 1522)   albuterol 2.5 mg /3 mL (0.083 %) nebulizer solution 3 mL (3 mL nebulization Given 6/16/24 1809)   methylPREDNISolone sod succinate (SOLU-Medrol) 40 mg/mL injection 40 mg (40 mg intravenous Given 6/16/24 1836)   famotidine PF (Pepcid) injection 20 mg (20 mg intravenous Given 6/16/24 1841)   calcium gluconate in NS IV 2 g (2 g intravenous New Bag 6/16/24 2028)       Cardiac Hx:  Last echo: No results found for this or any previous visit.   Last cath: CV NCDR CATHPCI V5 COLLECTION FORM   Last EKG:   Encounter Date: 06/11/24   Electrocardiogram, 12-lead PRN ACS symptoms   Result Value    Ventricular Rate 121    Atrial Rate 121    PA Interval 146    QRS Duration 84    QT Interval 322    QTC Calculation(Bazett) 457    P Axis 47    R Axis -7    T Axis 32    QRS Count 20    Q Onset 218    P Onset 145    P Offset 183    T Offset 379    QTC Fredericia 406    Narrative    Sinus tachycardia  Inferior infarct (cited on or before 11-JUN-2024)  Possible Anterior infarct (cited on or  before 11-JUN-2024)  Abnormal ECG  When compared with ECG of 11-JUN-2024 18:00, (unconfirmed)  No significant change was found        GI Hx:  Last EGD: No results found for this or any previous visit.  Last Colonoscopy:  No results found for this or any previous visit.     Past Medical History  Past Medical History:   Diagnosis Date    Cancer (Multi)     Hypertension        Surgical History  Past Surgical History:   Procedure Laterality Date    TONSILLECTOMY         Social History  She reports that she has never smoked. She has never been exposed to tobacco smoke. She has never used smokeless tobacco. She reports that she does not drink alcohol and does not use drugs.    Family History  Family History   Problem Relation Name Age of Onset    Breast cancer Mother  61    Heart disease Father  94    No Known Problems Sister      No Known Problems Brother          Allergies  Patient has no known allergies.    Medications  Home Meds  Prior to Admission medications    Medication Sig Start Date End Date Taking? Authorizing Provider   ferrous sulfate, 325 mg ferrous sulfate, tablet Take 1 tablet by mouth 2 times a day. 8/3/22  Yes Historical Provider, MD   hydroCHLOROthiazide (Microzide) 12.5 mg tablet Take 1 tablet (12.5 mg) by mouth once daily. 5/20/24  Yes Historical Provider, MD   metoprolol tartrate (Lopressor) 75 mg tablet Take 1 tablet (75 mg) by mouth 2 times a day. 6/7/24  Yes Historical Provider, MD Gupta 8.4 gram powder in packet Take 8.4 g by mouth once daily. 2/1/24  Yes Historical Provider, MD   cholecalciferol (Vitamin D-3) 50 mcg (2,000 unit) capsule Take 1 capsule (50 mcg) by mouth early in the morning..    Historical Provider, MD   lisinopril 10 mg tablet Take 1 tablet (10 mg) by mouth once daily.    Historical Provider, MD   multivitamin with minerals tablet Take 1 tablet by mouth once daily.    Historical Provider, MD     Scheduled medications  [Transfer Hold] acyclovir, 400 mg, oral, q12h  ADDI  [Transfer Hold] allopurinol, 300 mg, oral, BID  [Transfer Hold] dexAMETHasone, 6 mg, oral, q12h ADDI  EPINEPHrine, , ,   furosemide, 20 mg, intravenous, Once  [Held by provider] heparin (porcine), 5,000 Units, subcutaneous, q8h  [Transfer Hold] hydroCHLOROthiazide, 12.5 mg, oral, Daily  [Transfer Hold] lidocaine, 5 mL, infiltration, Once  [Transfer Hold] metoprolol tartrate, 75 mg, oral, BID  norepinephrine in dextrose 5 %, , ,   [Transfer Hold] pantoprazole, 40 mg, oral, Daily before breakfast  [Transfer Hold] perflutren protein A microsphere, 0.5 mL, intravenous, Once in imaging  sodium bicarbonate, , ,   sodium bicarbonate, , ,   sodium bicarbonate, , ,   [Transfer Hold] sulfur hexafluoride microsphr, 2 mL, intravenous, Once in imaging  vasopressin, , ,       Continuous medications  dextrose 10 % in water (D10W), 50 mL/hr  sodium chloride 0.9%, 100 mL/hr, Last Rate: 100 mL/hr (06/16/24 2499)      PRN medications  PRN medications: [Transfer Hold] alteplase, [Transfer Hold] alteplase, [Transfer Hold] dextrose, EPINEPHrine, [Transfer Hold] EPINEPHrine HCl, [Transfer Hold] LORazepam, norepinephrine in dextrose 5 %, [Transfer Hold] prochlorperazine, [Transfer Hold] prochlorperazine, sodium bicarbonate, sodium bicarbonate, sodium bicarbonate, [Transfer Hold] sodium chloride, vasopressin      Physical Exam  Constitutional: intubated   HEENT: sclerae anicteric  CV: RRR, no murmurs noted  Pulm: intubated  GI: abd soft, NT, ND  Skin: warm and dry  Ext: bilateral LE without pitting edema   Neuro: unable to assess I/s/o cardiac arrest  Psych: unable to assess cardiac arrest    Assessment/Plan   Jailene Lacy is a 73 y.o. female with PMHx of HTN, HLD, presented to OSH with leukocytosis of 60k and diffuse lymphadenopathy transferred to  for further evaluation. Patient was admitted to the MICU after a cardiac arrest.     In the MICU, patient had cardiac arrest again in the MICU 3 times total.  Arterial line obtained.  ABG  with worsening acidosis and lactate.  Patient was on drips of norepinephrine 0.9, epinephrine 0.9, vasopressin 0.03.  Family arrived to MICU about an hour later after patient's admission, family's decision was to transition patient to DNR status.  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    CARDIOVASCULAR  #HTN  -Holding home medications     PULMONARY  #Intubated post cardiac arrest    RENAL/  #YRN  ::Likely 2/2 to dehydration     #Hyperkalemia  ::Likely 2/2 to tumor lysis syndrome   ::Patient unstable for dialysis     GASTROINTESTINAL  #No active issues    ENDOCRINE  No active issues    HEME/ONC  #DLBCL vs high grade lymphoma   Presented with leukocytosis of 60k, now 96.9 (6/15) related to disease   -flow cytometry favoring lymphoid malignancy      - initiate dexamethasone 4mg q12h, was increased to 6mg q12h 6/15  - s/p Rituxan 6/16   -Onco-Echo EF 70-75%  -BMBx, pending  - ENT consulted for lymph node biopsy, planned for 6/14 but unable to be completed due to OR space, planned again for Monday 6/17  - PET/CT: 1. Innumerable hypermetabolic cervical, abdominopelvic lymph nodes  consistent with an active lymphomatous process. Splenomegaly with diffusely increased activity throughout the spleen, is compatible with splenic involvement. Diffusely increased FDG uptake is seen in the bone marrow, possible representing marrow hyperplasia related to the patient's history of anaemia but is concerning for lymphomatous involvement. Consider correlation with bone marrow biopsy.    INFECTIOUS DISEASE  ::Leukocytosis I/s/o malignancy  ::No concern for infection    MSK/DERM  No active issues    Code Status: DNR (confirmed on admission)   NOK:  Primary Emergency Contact: rizwana ashley, [place] Phone: [phone]         Kevin River MD  Internal Medicine, PGY-2

## 2024-06-17 NOTE — CODE DOCUMENTATION
Date of code: 6/16/2024 9:10 PM    Precipitating Events Unknown  Loss of Pulse Yes  ACLS Initiated Yes  Initial Rhythm INITIAL RHYTMN: pulseless electrical activity  Length of ACLS Performed 2 cycles  Interventions Outside of ACLS 1 amp sodium bicarb  Advanced Airway Yes  Return of Spontaneous Circulation Yes  Cardiology Notified No  Issues for Follow-up NA  Targeted Temperature Management NA  Transfer to a Different Location No  Family or Healthcare Power of  Notified Yes  Details of Discussion (I.e. who was notified, changes to code status, etc) Sister Hazel notified, patient made DNR    Please refer to Code flowsheet for additional documentation.

## 2024-06-17 NOTE — DISCHARGE SUMMARY
Discharge Diagnosis  High grade B-cell lymphoma (Multi)  Patient  after cardiopulmonary arrest     Issues Requiring Follow-Up  none    Test Results Pending At Discharge  Pending Labs       Order Current Status    T-Spot TB Collected (24)    BCR/ABL1, FISH In process    Blood Gas Lactic Acid, Venous In process    Bone Marrow Evaluation In process    Bone marrow aspiration and exam In process    Bone marrow cell differential In process    CYTOGENETICS HOLD In process    Chromosome Analysis, Bone Marrow In process    Coagulation Screen In process    Comprehensive metabolic panel In process    Focused Lymphoma Panel In process    HLA DNA Type Panel In process    HLA HR Type, Buccal In process    Magnesium In process    Microarray Analysis, Heme/Onc In process    PML/BARTOLO t(15;17), FISH In process    Type and Screen In process    UHTL HOLD In process            Hospital Course  Jailene Lacy is a 73 y.o. female with PMHx of HTN, HLD, presented to OSH with leukocytosis of 60k and diffuse lymphadenopathy transferred to  for further evaluation. Patient was admitted to the MICU after a cardiac arrest.      On initial presentation to OSH, patient stated she noticed left mandibular mass slowly enlarge over the last 4 weeks, decreased appetite, with around 7 lbs weight loss. On admission to  [facility] floor, patient was suspected to have DLBCL vs. high grade lymphoma, leukocytosis increased to 96 since her admission on . Bone marrow biopsy was obtained on  and pending. ENT was planning on obtaining lymph node biopsy. PET CT with hypermetabolic diffuse lymph nodes, splenomegaly, diffuse increased FDG uptake in the bone marrow. YRN worsening since her admission, was on maintenance IV fluids, hyperuricemia initially 11.3 downtrended, started on Allopurinol 300, hyperkalemia worsening. Labs c/f tymor lysis syndrome with increasing LDH. Patient was started on dexamethasone 4mg q12h that was later  increased to 6 mg q12h on 6/15.      Patient was given her first dose of rituximab on 6/16 around 3 PM, infusion stopped due to hypotension.  Patient was given 40 mg Solu-Medrol, Pepcid and Benadryl and 500 cc bolus LR.  Restarted rituximab at a lower dose afterwards and patient tolerated infusion rate.  Around 6 PM patient was reporting nausea, received IV Compazine and infusion stopped.  Rapid was called at 6:40 PM.  IM epinephrine given for severe reaction.  Patient unresponsive.  CODE BLUE called at 1900.  ROSC achieved patient intubated and transferred to MICU, coded again in the MICU 3 times total.  Arterial line obtained.  ABG with worsening acidosis and lactate. Patient was on norepinephrine, epinephrine, vasopressin drips.  Family arrived to MICU about an hour later after patient's admission, family decision was to transition patient to DNR status.  Few minutes afterwards, patient coded again and pronounced dead at 21:41.    Pertinent Physical Exam At Time of Discharge  Constitutional: unresponsive to verbal or physical stimuli  CV: no pulse detected  Pulm: no spontaneous respirations   Neuro: does not respond to verbal or physical stimuli, pupils fixed and dilated, absent cornea reflex      Outpatient Follow-Up  No future appointments.    Kevin River MD

## 2024-06-17 NOTE — CODE DOCUMENTATION
Date of code: 6/16/2024, 7:01 PM     Precipitating Events : Unknown  Loss of Pulse Yes  ACLS Initiated Yes  Initial Rhythm INITIAL RHYTMN: pulseless electrical activity  Length of ACLS Performed 4 cycles   Interventions Outside of ACLS : 3 amps of bicarb,  1 vial of calcium  Advanced Airway Yes  Return of Spontaneous Circulation Yes  Cardiology Notified No  Issues for Follow-up Blood gas, Labs, Goals of care, CXR  Targeted Temperature Management : NA  Transfer to a Different Location Yes  Family or Healthcare Power of  Notified Yes  Details of Discussion (I.e. who was notified, changes to code status, etc) : Significant other was notified, Will keep her full code until POA is identified,     Please refer to Code flowsheet for additional documentation.

## 2024-06-19 LAB
CHROM ANALY OVERALL INTERP-IMP: NORMAL
CHROM ANALY OVERALL INTERP-IMP: NORMAL
ELECTRONICALLY COSIGNED BY CYTOGENETICS: NORMAL
ELECTRONICALLY COSIGNED BY CYTOGENETICS: NORMAL
ELECTRONICALLY SIGNED BY CYTOGENETICS: NORMAL
ELECTRONICALLY SIGNED BY CYTOGENETICS: NORMAL
ELECTRONICALLY SIGNED BY: NORMAL
FISH ISCN RESULTS: NORMAL
FISH ISCN RESULTS: NORMAL
LYMPHOID NGS RESULTS: NORMAL

## 2024-06-20 LAB
CHROM ANALY OVERALL INTERP-IMP: NORMAL
CHROM ANALY OVERALL INTERP-IMP: NORMAL
ELECTRONICALLY COSIGNED BY CYTOGENETICS: NORMAL
ELECTRONICALLY COSIGNED BY CYTOGENETICS: NORMAL
ELECTRONICALLY SIGNED BY CYTOGENETICS: NORMAL
ELECTRONICALLY SIGNED BY CYTOGENETICS: NORMAL
FISH ISCN RESULTS: NORMAL
FISH ISCN RESULTS: NORMAL

## 2024-06-21 LAB
ATRIAL RATE: 120 BPM
P AXIS: 29 DEGREES
P OFFSET: 193 MS
P ONSET: 161 MS
PR INTERVAL: 128 MS
Q ONSET: 215 MS
QRS COUNT: 20 BEATS
QRS DURATION: 80 MS
QT INTERVAL: 312 MS
QTC CALCULATION(BAZETT): 440 MS
QTC FREDERICIA: 393 MS
R AXIS: 1 DEGREES
T AXIS: 51 DEGREES
T OFFSET: 371 MS
VENTRICULAR RATE: 120 BPM

## 2024-06-26 ENCOUNTER — LAB REQUISITION (OUTPATIENT)
Dept: LAB | Facility: CLINIC | Age: 74
End: 2024-06-26
Payer: MEDICARE

## 2024-06-26 DIAGNOSIS — C95.00 ACUTE LEUKEMIA OF UNSPECIFIED CELL TYPE NOT HAVING ACHIEVED REMISSION (MULTI): ICD-10-CM

## 2024-06-26 LAB
DNA CLASS I + II VERIFICATION TYPING: NORMAL
HLA CLS I TYP PNL BLD/T DONR HIGH RES: NORMAL
HLA CLS I TYP PNL BLD/T DONR HIGH RES: NORMAL
HLA RESULTS: NORMAL
HLA-DP2 QL: NORMAL
HLA-DP2 QL: NORMAL
HLA-DQB1 HIGH RES: NORMAL
HLA-DQB1 HIGH RES: NORMAL
HLA-DRB1 HIGH RES: NORMAL
HLA-DRB1 HIGH RES: NORMAL

## 2024-07-02 LAB
ELECTRONICALLY SIGNED BY CYTOGENETICS: NORMAL
MICROARRAY PLATFORM: NORMAL

## 2024-07-03 ASSESSMENT — ENCOUNTER SYMPTOMS
CHEST TIGHTNESS: 0
SHORTNESS OF BREATH: 0

## 2024-07-03 NOTE — ED PROVIDER NOTES
consistent with lymphoma/leukemia or other metastatic disease.      Small to moderate amount of free fluid in the pelvis.      Stable 4.1 cm left adnexal cystic lesion.         CT CHEST WO CONTRAST   Final Result   1. Prominent mediastinal and bilateral axillary lymphadenopathy. This is   concerning for lymphoma or metastatic disease.   2. Thickening of the wall of the inferior thoracic esophagus, reflux   esophagitis versus esophageal malignancy. Suggest correlation with EGD.   3. Mild patchy infiltrate in the posterior and lateral left lung base,   atelectasis versus pneumonia versus scarring.   4. Tiny noncalcified subpleural nodules in the right middle and right lower   lobes, most likely scarring from previous inflammatory disease, although   malignancy cannot be entirely excluded.   5. Cystic structures in the left lobe of the thyroid. These could be further   evaluated with ultrasound on a nonemergent basis.      RECOMMENDATIONS:   Suggest ultrasound of the thyroid on a nonemergent basis.      Suggest EGD for evaluation of thickened wall of the inferior thoracic   esophagus.         CT SOFT TISSUE NECK WO CONTRAST   Final Result   1. Moderate diffuse bilateral cervical lymphadenopathy.  These changes may be   related to a lymphoproliferative disorder such as lymphoma.  Biopsy may be   considered.   2. 2.2 cm left thyroid nodule.  Thyroid ultrasound is suggested for further   evaluation.               ------------------------- NURSING NOTES AND VITALS REVIEWED ---------------------------  Date / Time Roomed:  6/10/2024  8:46 AM  ED Bed Assignment:  [id number]/0521-01    The nursing notes within the ED encounter and vital signs as below have been reviewed.   No data found.    Oxygen Saturation Interpretation: Normal      ------------------------------------------ PROGRESS NOTES ------------------------------------------  Re-evaluation(s):  Patient’s symptoms show no change  Repeat physical examination is not

## 2024-07-23 LAB
BAND RESOLUTION: 400 BANDS
CHROM ANALY OVERALL INTERP-IMP: NORMAL
CHROMOSOME ANALYSIS CELLS ANALYZED: 4 CELLS
CHROMOSOME ANALYSIS CELLS IMAGED: 3 CELLS
CHROMOSOME ANALYSIS HYPERMODAL CELL COUNT: 0 CELLS
CHROMOSOME ANALYSIS HYPOMODAL CELL COUNT: 1 CELLS
CHROMOSOME ANALYSIS MODAL CHROMOSOME NO: 49 CHROMOSOMES
CHROMOSOME ANALYSIS STAINING METHOD: NORMAL
ELECTRONICALLY SIGNED BY CYTOGENETICS: NORMAL
KARYOTYP MAR: 3 CELLS
TOTAL CELLS COUNTED MAR: 4 CELLS

## (undated) DEVICE — APPLICATOR MEDICATED 26 CC SOLUTION HI LT ORNG CHLORAPREP

## (undated) DEVICE — SET SURG INSTR DISSECT

## (undated) DEVICE — MARKER,SKIN,WI/RULER AND LABELS: Brand: MEDLINE

## (undated) DEVICE — TOWEL,OR,DSP,ST,BLUE,STD,6/PK,12PK/CS: Brand: MEDLINE

## (undated) DEVICE — SYRINGE MED 10ML TRNSLUC BRL PLUNG BLK MRK POLYPR CTRL

## (undated) DEVICE — NDL CNTR 40CT FM MAG: Brand: MEDLINE INDUSTRIES, INC.

## (undated) DEVICE — GLOVE ORANGE PI 7 1/2   MSG9075

## (undated) DEVICE — GAUZE,SPONGE,4"X4",16PLY,STRL,LF,10/TRAY: Brand: MEDLINE

## (undated) DEVICE — BANDAGE,GAUZE,4.5"X4.1YD,STERILE,LF: Brand: MEDLINE

## (undated) DEVICE — NEEDLE HYPO 25GA L1.5IN BLU POLYPR HUB S STL REG BVL STR

## (undated) DEVICE — ELECTRODE PT RET AD L9FT HI MOIST COND ADH HYDRGEL CORDED

## (undated) DEVICE — SPECIMEN CUP W/LID: Brand: DEROYAL

## (undated) DEVICE — PAD,NON-ADHERENT,3X8,STERILE,LF,1/PK: Brand: MEDLINE

## (undated) DEVICE — PAD,ABDOMINAL,5"X9",ST,LF,25/BX: Brand: MEDLINE INDUSTRIES, INC.

## (undated) DEVICE — PENCIL ES L3M BTTN SWCH HOLSTER W/ BLDE ELECTRD EDGE

## (undated) DEVICE — YANKAUER,BULB TIP,W/O VENT,RIGID,STERILE: Brand: MEDLINE

## (undated) DEVICE — GAUZE,SPONGE,4"X4",16PLY,XRAY,STRL,LF: Brand: MEDLINE

## (undated) DEVICE — COVER,LIGHT HANDLE,FLX,1/PK: Brand: MEDLINE INDUSTRIES, INC.

## (undated) DEVICE — INTENDED FOR TISSUE SEPARATION, AND OTHER PROCEDURES THAT REQUIRE A SHARP SURGICAL BLADE TO PUNCTURE OR CUT.: Brand: BARD-PARKER ® STAINLESS STEEL BLADES

## (undated) DEVICE — PACK,LAPAROTOMY,NO GOWNS: Brand: MEDLINE

## (undated) DEVICE — DOUBLE BASIN SET: Brand: MEDLINE INDUSTRIES, INC.

## (undated) DEVICE — TUBING, SUCTION, 1/4" X 10', STRAIGHT: Brand: MEDLINE

## (undated) DEVICE — GOWN,SIRUS,NONRNF,SETINSLV,XL,20/CS: Brand: MEDLINE